# Patient Record
Sex: FEMALE | Race: WHITE | NOT HISPANIC OR LATINO | Employment: FULL TIME | URBAN - METROPOLITAN AREA
[De-identification: names, ages, dates, MRNs, and addresses within clinical notes are randomized per-mention and may not be internally consistent; named-entity substitution may affect disease eponyms.]

---

## 2022-09-20 ENCOUNTER — HOSPITAL ENCOUNTER (OUTPATIENT)
Dept: TELEMEDICINE | Facility: OTHER | Age: 67
Discharge: HOME | End: 2022-09-20
Payer: COMMERCIAL

## 2022-09-20 PROCEDURE — G0425 PR INPT TELEHEALTH CONSULT 30M: ICD-10-PCS | Mod: GT,,, | Performed by: PSYCHIATRY & NEUROLOGY

## 2022-09-20 PROCEDURE — G0425 INPT/ED TELECONSULT30: HCPCS | Mod: GT,,, | Performed by: PSYCHIATRY & NEUROLOGY

## 2022-09-20 NOTE — CONSULTS
[facility]  Psychiatry  Telepsychiatry Consult Note    Please see previous notes:    Patient agreeable to consultation via telepsychiatry.    Tele-Consultation from Psychiatry started: 9/20/2022 at 5:20 PM  The chief complaint leading to psychiatric consultation is: Anxiety   This consultation was requested by [facility], the Emergency Department attending physician.  The location of the consulting psychiatrist is [place], [place].  The patient location is [facility]   Also present with the patient at the time of the consultation: Nursing staff    Patient Identification:   Jasmina Sharma is a 67 y.o. female.    Patient information was obtained from patient.    Consults  Consult Start Time: 09/20/2022 17:20 CDT  Consult End Time: 09/20/2022 17:40 CDT      Subjective:     History of Present Illness:  On Interview:   Patient seen through teleconference this evening on my approach. She reports that she is currently hospitalized due to chronic COPD and she is currently in the hospital for physical rehab.     She reports that she is doing well in the hospital and she believes that the care that she is currently getting is adequate. She reports that she has had some issues with anxiety due to being intubated recently. She reports that she has been shaking frequently and having panic attacks. She has been prescribed Klonopin 1 mg PO TID in the past however the medication was decreased due to the patient being found lethargic in the morning.     Her main concern at this time is difficulty sleeping and trouble eating. She denies any SI/HI.    Psychiatric History:   Previous Psychiatric Hospitalizations: No   Previous Medication Trials: Yes   Previous Suicide Attempts: no   History of Violence: No  History of Depression: No  History of Zahra: No  History of Auditory/Visual Hallucination No  History of Delusions: No  Outpatient psychiatrist  "(current & past): No    Substance Abuse History:  Tobacco:Yes  Alcohol: Yes  Illicit Substances:No  Detox/Rehab: No    Legal History: Past charges/incarcerations: No     Family Psychiatric History: No      Social History:  Developmental/Childhood:Achieved all developmental milestones timely  Education: Associates degree in nursing  Employment Status/Finances:Retired   Relationship Status/Sexual Orientation:  x 3  Children: 2  Housing Status: Home alone   history:  NO  Access to gun: YES:      Judaism:Actively participates in organized Yarsanism  Recreational activities: Time with friends    Patient was born and raised in [place]   Psychiatric Mental Status Exam:  Arousal: alert  Sensorium/Orientation: disoriented to place, oriented to time and person  Behavior/Cooperation: normal, cooperative   Speech: normal tone, normal rate, normal pitch, normal volume  Language: grossly intact  Mood: " anxious "   Affect: appropriate  Thought Process: normal and logical  Thought Content:   Auditory hallucinations: NO  Visual hallucinations: NO  Paranoia: NO  Delusions:  NO  Suicidal ideation: NO  Homicidal ideation: NO  Attention/Concentration:  intact  Memory:    Recent:  Intact   Remote: Intact  Fund of Knowledge: Aware of current events   Abstract reasoning: proverbs were abstract, similarities were abstract  Insight: has awareness of illness  Judgment: Fair      Past Medical History: No past medical history on file.   Laboratory Data: Labs Reviewed - No data to display    Neurological History:  Seizures: No  Head trauma: No    Allergies:   Review of patient's allergies indicates:  Not on File    Medications in ER: Medications - No data to display    Medications at home:     No new subjective & objective note has been filed under this hospital service since the last note was generated.      Assessment - Diagnosis - Goals:     Diagnosis/Impression: Patient is a 67 year old woman currently admitted to physical " rehab. Psychiatry was consulted to assist with the patient's issues with sleep and anxiety.    Rec:   -Agree with Celexa 40 mg PO daily  -Agree with Klonopin 0.5 mg PO TID  -Initiate Remeron 15 mg PO QHS    Please reconsult psychiatry if necessary.     Time with patient: 20 minutes      More than 50% of the time was spent counseling/coordinating care    Consulting clinician was informed of the encounter and consult note.    Consultation ended: 9/20/2022 at 5:40 PM    Elio Gutiérrez DO   Psychiatry  [facility]

## 2022-10-24 ENCOUNTER — LAB VISIT (OUTPATIENT)
Dept: LAB | Facility: HOSPITAL | Age: 67
End: 2022-10-24
Payer: MEDICARE

## 2022-10-24 ENCOUNTER — OFFICE VISIT (OUTPATIENT)
Dept: PRIMARY CARE CLINIC | Facility: CLINIC | Age: 67
End: 2022-10-24
Payer: MEDICARE

## 2022-10-24 VITALS
OXYGEN SATURATION: 90 % | RESPIRATION RATE: 16 BRPM | HEIGHT: 62 IN | HEART RATE: 72 BPM | DIASTOLIC BLOOD PRESSURE: 68 MMHG | TEMPERATURE: 98 F | WEIGHT: 113.31 LBS | BODY MASS INDEX: 20.85 KG/M2 | SYSTOLIC BLOOD PRESSURE: 112 MMHG

## 2022-10-24 DIAGNOSIS — J31.0 CHRONIC RHINITIS: ICD-10-CM

## 2022-10-24 DIAGNOSIS — I50.32 CHRONIC DIASTOLIC CONGESTIVE HEART FAILURE: ICD-10-CM

## 2022-10-24 DIAGNOSIS — H61.21 IMPACTED CERUMEN, RIGHT EAR: ICD-10-CM

## 2022-10-24 DIAGNOSIS — R39.11 URINARY HESITANCY: ICD-10-CM

## 2022-10-24 DIAGNOSIS — Z76.89 ENCOUNTER TO ESTABLISH CARE: Primary | ICD-10-CM

## 2022-10-24 DIAGNOSIS — R06.02 SOB (SHORTNESS OF BREATH) ON EXERTION: ICD-10-CM

## 2022-10-24 DIAGNOSIS — I10 PRIMARY HYPERTENSION: ICD-10-CM

## 2022-10-24 DIAGNOSIS — G62.9 NEUROPATHY: ICD-10-CM

## 2022-10-24 DIAGNOSIS — F41.9 ANXIETY: ICD-10-CM

## 2022-10-24 DIAGNOSIS — E78.5 HYPERLIPIDEMIA, UNSPECIFIED HYPERLIPIDEMIA TYPE: ICD-10-CM

## 2022-10-24 DIAGNOSIS — Z11.59 ENCOUNTER FOR HCV SCREENING TEST FOR LOW RISK PATIENT: ICD-10-CM

## 2022-10-24 DIAGNOSIS — G47.9 SLEEP DIFFICULTIES: ICD-10-CM

## 2022-10-24 DIAGNOSIS — N18.31 STAGE 3A CHRONIC KIDNEY DISEASE: ICD-10-CM

## 2022-10-24 DIAGNOSIS — Z12.11 COLON CANCER SCREENING: ICD-10-CM

## 2022-10-24 DIAGNOSIS — J44.9 CHRONIC OBSTRUCTIVE PULMONARY DISEASE, UNSPECIFIED COPD TYPE: ICD-10-CM

## 2022-10-24 DIAGNOSIS — Z12.31 BREAST CANCER SCREENING BY MAMMOGRAM: ICD-10-CM

## 2022-10-24 DIAGNOSIS — K21.9 GASTROESOPHAGEAL REFLUX DISEASE WITHOUT ESOPHAGITIS: ICD-10-CM

## 2022-10-24 PROCEDURE — 3288F PR FALLS RISK ASSESSMENT DOCUMENTED: ICD-10-PCS | Mod: CPTII,S$GLB,, | Performed by: FAMILY MEDICINE

## 2022-10-24 PROCEDURE — 3074F SYST BP LT 130 MM HG: CPT | Mod: CPTII,S$GLB,, | Performed by: FAMILY MEDICINE

## 2022-10-24 PROCEDURE — 1160F PR REVIEW ALL MEDS BY PRESCRIBER/CLIN PHARMACIST DOCUMENTED: ICD-10-PCS | Mod: CPTII,S$GLB,, | Performed by: FAMILY MEDICINE

## 2022-10-24 PROCEDURE — 99999 PR PBB SHADOW E&M-EST. PATIENT-LVL V: CPT | Mod: PBBFAC,,, | Performed by: FAMILY MEDICINE

## 2022-10-24 PROCEDURE — 99499 RISK ADDL DX/OHS AUDIT: ICD-10-PCS | Mod: S$GLB,,, | Performed by: FAMILY MEDICINE

## 2022-10-24 PROCEDURE — 3074F PR MOST RECENT SYSTOLIC BLOOD PRESSURE < 130 MM HG: ICD-10-PCS | Mod: CPTII,S$GLB,, | Performed by: FAMILY MEDICINE

## 2022-10-24 PROCEDURE — 99499 UNLISTED E&M SERVICE: CPT | Mod: S$GLB,,, | Performed by: FAMILY MEDICINE

## 2022-10-24 PROCEDURE — 4010F ACE/ARB THERAPY RXD/TAKEN: CPT | Mod: CPTII,S$GLB,, | Performed by: FAMILY MEDICINE

## 2022-10-24 PROCEDURE — 99205 OFFICE O/P NEW HI 60 MIN: CPT | Mod: S$GLB,,, | Performed by: FAMILY MEDICINE

## 2022-10-24 PROCEDURE — 84443 ASSAY THYROID STIM HORMONE: CPT | Performed by: FAMILY MEDICINE

## 2022-10-24 PROCEDURE — 99205 PR OFFICE/OUTPT VISIT, NEW, LEVL V, 60-74 MIN: ICD-10-PCS | Mod: S$GLB,,, | Performed by: FAMILY MEDICINE

## 2022-10-24 PROCEDURE — 1160F RVW MEDS BY RX/DR IN RCRD: CPT | Mod: CPTII,S$GLB,, | Performed by: FAMILY MEDICINE

## 2022-10-24 PROCEDURE — 36415 COLL VENOUS BLD VENIPUNCTURE: CPT | Mod: PN | Performed by: FAMILY MEDICINE

## 2022-10-24 PROCEDURE — 1159F MED LIST DOCD IN RCRD: CPT | Mod: CPTII,S$GLB,, | Performed by: FAMILY MEDICINE

## 2022-10-24 PROCEDURE — 86803 HEPATITIS C AB TEST: CPT | Performed by: FAMILY MEDICINE

## 2022-10-24 PROCEDURE — 3288F FALL RISK ASSESSMENT DOCD: CPT | Mod: CPTII,S$GLB,, | Performed by: FAMILY MEDICINE

## 2022-10-24 PROCEDURE — 80053 COMPREHEN METABOLIC PANEL: CPT | Performed by: FAMILY MEDICINE

## 2022-10-24 PROCEDURE — 4010F PR ACE/ARB THEARPY RXD/TAKEN: ICD-10-PCS | Mod: CPTII,S$GLB,, | Performed by: FAMILY MEDICINE

## 2022-10-24 PROCEDURE — 99999 PR PBB SHADOW E&M-EST. PATIENT-LVL V: ICD-10-PCS | Mod: PBBFAC,,, | Performed by: FAMILY MEDICINE

## 2022-10-24 PROCEDURE — 87086 URINE CULTURE/COLONY COUNT: CPT | Performed by: FAMILY MEDICINE

## 2022-10-24 PROCEDURE — 1101F PT FALLS ASSESS-DOCD LE1/YR: CPT | Mod: CPTII,S$GLB,, | Performed by: FAMILY MEDICINE

## 2022-10-24 PROCEDURE — 1101F PR PT FALLS ASSESS DOC 0-1 FALLS W/OUT INJ PAST YR: ICD-10-PCS | Mod: CPTII,S$GLB,, | Performed by: FAMILY MEDICINE

## 2022-10-24 PROCEDURE — 3078F DIAST BP <80 MM HG: CPT | Mod: CPTII,S$GLB,, | Performed by: FAMILY MEDICINE

## 2022-10-24 PROCEDURE — 85025 COMPLETE CBC W/AUTO DIFF WBC: CPT | Performed by: FAMILY MEDICINE

## 2022-10-24 PROCEDURE — 80061 LIPID PANEL: CPT | Performed by: FAMILY MEDICINE

## 2022-10-24 PROCEDURE — 83880 ASSAY OF NATRIURETIC PEPTIDE: CPT | Performed by: FAMILY MEDICINE

## 2022-10-24 PROCEDURE — 3078F PR MOST RECENT DIASTOLIC BLOOD PRESSURE < 80 MM HG: ICD-10-PCS | Mod: CPTII,S$GLB,, | Performed by: FAMILY MEDICINE

## 2022-10-24 PROCEDURE — 81003 URINALYSIS AUTO W/O SCOPE: CPT | Performed by: FAMILY MEDICINE

## 2022-10-24 PROCEDURE — 1159F PR MEDICATION LIST DOCUMENTED IN MEDICAL RECORD: ICD-10-PCS | Mod: CPTII,S$GLB,, | Performed by: FAMILY MEDICINE

## 2022-10-24 RX ORDER — PRAMIPEXOLE DIHYDROCHLORIDE 0.75 MG/1
TABLET ORAL
COMMUNITY

## 2022-10-24 RX ORDER — FLUTICASONE FUROATE, UMECLIDINIUM BROMIDE AND VILANTEROL TRIFENATATE 100; 62.5; 25 UG/1; UG/1; UG/1
POWDER RESPIRATORY (INHALATION)
COMMUNITY
Start: 2022-09-16

## 2022-10-24 RX ORDER — METOPROLOL SUCCINATE 25 MG/1
25 TABLET, EXTENDED RELEASE ORAL DAILY
COMMUNITY
Start: 2022-09-27 | End: 2022-10-24 | Stop reason: SDUPTHER

## 2022-10-24 RX ORDER — NALOXONE HYDROCHLORIDE 4 MG/.1ML
SPRAY NASAL
COMMUNITY

## 2022-10-24 RX ORDER — HYDROCODONE BITARTRATE AND ACETAMINOPHEN 7.5; 325 MG/1; MG/1
TABLET ORAL
COMMUNITY
Start: 2022-09-30

## 2022-10-24 RX ORDER — LORATADINE 10 MG/1
TABLET ORAL
COMMUNITY
End: 2022-10-24 | Stop reason: SDUPTHER

## 2022-10-24 RX ORDER — METOPROLOL SUCCINATE 25 MG/1
25 TABLET, EXTENDED RELEASE ORAL DAILY
Qty: 90 TABLET | Refills: 3 | Status: SHIPPED | OUTPATIENT
Start: 2022-10-24 | End: 2023-03-01 | Stop reason: SDUPTHER

## 2022-10-24 RX ORDER — ROSUVASTATIN CALCIUM 10 MG/1
10 TABLET, COATED ORAL NIGHTLY
Qty: 90 TABLET | Refills: 3 | Status: SHIPPED | OUTPATIENT
Start: 2022-10-24 | End: 2023-03-01 | Stop reason: SDUPTHER

## 2022-10-24 RX ORDER — LORATADINE 10 MG/1
10 TABLET ORAL DAILY
Qty: 90 TABLET | Refills: 3 | Status: SHIPPED | OUTPATIENT
Start: 2022-10-24 | End: 2023-06-05 | Stop reason: ALTCHOICE

## 2022-10-24 RX ORDER — BENZONATATE 100 MG/1
CAPSULE ORAL
COMMUNITY
End: 2022-10-24 | Stop reason: ALTCHOICE

## 2022-10-24 RX ORDER — CELECOXIB 200 MG/1
200 CAPSULE ORAL 2 TIMES DAILY
COMMUNITY
Start: 2022-07-29 | End: 2022-11-07 | Stop reason: SDUPTHER

## 2022-10-24 RX ORDER — BUMETANIDE 1 MG/1
0.5 TABLET ORAL EVERY OTHER DAY
Qty: 45 TABLET | Refills: 3 | Status: SHIPPED | OUTPATIENT
Start: 2022-10-24 | End: 2023-03-01 | Stop reason: SDUPTHER

## 2022-10-24 RX ORDER — ROSUVASTATIN CALCIUM 10 MG/1
10 TABLET, COATED ORAL NIGHTLY
COMMUNITY
Start: 2022-10-10 | End: 2022-10-24 | Stop reason: SDUPTHER

## 2022-10-24 RX ORDER — FOLIC ACID 1 MG/1
1 TABLET ORAL EVERY MORNING
COMMUNITY
Start: 2022-09-13 | End: 2022-11-07 | Stop reason: SDUPTHER

## 2022-10-24 RX ORDER — CITALOPRAM 40 MG/1
40 TABLET, FILM COATED ORAL DAILY
Qty: 90 TABLET | Refills: 3 | Status: SHIPPED | OUTPATIENT
Start: 2022-10-24 | End: 2023-03-01 | Stop reason: ALTCHOICE

## 2022-10-24 RX ORDER — BUMETANIDE 1 MG/1
TABLET ORAL
COMMUNITY
Start: 2022-09-27 | End: 2022-10-24 | Stop reason: SDUPTHER

## 2022-10-24 RX ORDER — PANTOPRAZOLE SODIUM 40 MG/1
40 TABLET, DELAYED RELEASE ORAL 2 TIMES DAILY
COMMUNITY
Start: 2022-09-27 | End: 2022-10-24 | Stop reason: SDUPTHER

## 2022-10-24 RX ORDER — ALBUTEROL SULFATE 90 UG/1
2 AEROSOL, METERED RESPIRATORY (INHALATION) EVERY 6 HOURS PRN
COMMUNITY
Start: 2022-01-27 | End: 2023-10-31

## 2022-10-24 RX ORDER — LOSARTAN POTASSIUM 25 MG/1
25 TABLET ORAL DAILY
COMMUNITY
Start: 2022-09-03 | End: 2022-10-24 | Stop reason: SDUPTHER

## 2022-10-24 RX ORDER — CLONAZEPAM 0.5 MG/1
0.25 TABLET ORAL NIGHTLY PRN
COMMUNITY
Start: 2022-10-15

## 2022-10-24 RX ORDER — CITALOPRAM 40 MG/1
40 TABLET, FILM COATED ORAL DAILY
COMMUNITY
Start: 2022-07-08 | End: 2022-10-24 | Stop reason: SDUPTHER

## 2022-10-24 RX ORDER — LOSARTAN POTASSIUM 25 MG/1
25 TABLET ORAL DAILY
Qty: 90 TABLET | Refills: 3 | Status: SHIPPED | OUTPATIENT
Start: 2022-10-24 | End: 2023-03-01 | Stop reason: SDUPTHER

## 2022-10-24 RX ORDER — GABAPENTIN 400 MG/1
CAPSULE ORAL
COMMUNITY
End: 2023-03-01 | Stop reason: ALTCHOICE

## 2022-10-24 RX ORDER — PANTOPRAZOLE SODIUM 40 MG/1
40 TABLET, DELAYED RELEASE ORAL DAILY
Qty: 90 TABLET | Refills: 3 | Status: SHIPPED | OUTPATIENT
Start: 2022-10-24 | End: 2023-03-01 | Stop reason: SDUPTHER

## 2022-10-24 RX ORDER — MIRTAZAPINE 15 MG/1
15 TABLET, FILM COATED ORAL NIGHTLY
COMMUNITY
Start: 2022-09-27 | End: 2022-10-24 | Stop reason: SDUPTHER

## 2022-10-24 RX ORDER — IPRATROPIUM BROMIDE AND ALBUTEROL SULFATE 2.5; .5 MG/3ML; MG/3ML
SOLUTION RESPIRATORY (INHALATION) EVERY 6 HOURS PRN
COMMUNITY
Start: 2022-07-08

## 2022-10-24 RX ORDER — MIRTAZAPINE 15 MG/1
15 TABLET, FILM COATED ORAL NIGHTLY PRN
Qty: 90 TABLET | Refills: 3 | Status: SHIPPED | OUTPATIENT
Start: 2022-10-24 | End: 2023-03-01 | Stop reason: SDUPTHER

## 2022-10-24 NOTE — PATIENT INSTRUCTIONS
Physically, you look pretty good today.      Let us get some blood work today to make sure things look good on the inside, as well.  Results will be posted to [employer] as soon as they are available.      Medication refills sent to pharmacy today.  Please continue taking them, as directed.      We are overdue for screening mammogram.  Orders placed today.  We do those here.      Are also overdue for colon cancer screening.  I understand your reluctance to have a colonoscopy.  Order placed today for Cologuard colon cancer screening test.  [facility] may contact you to verify address and insurance info.  When you receive the kit, please try to complete it asap and send it back to them.  If you have any questions regarding completing the test, feel free to call them directly at [phone].  They have 24/7 telephone support available.     When you leave here, feel free to go to Dr. Kamara's office to see if they have a canister for your inhaler.    Referral placed today for [facility] to get your kidneys checked out.  Feel free to call them at [phone] to schedule an appointment.  When you call, ask to schedule at the [place] location.    Referral also placed for ENT to get the ear wax cleared out.  They are located at our [place] location, on [address], near [facility] [address] (by the [employer]).    Referral also placed for Cardiology at that location.  If you cannot get in with your regular cardiologist in the next couple of weeks, feel free to see one of our cardiologists instead.

## 2022-10-24 NOTE — PROGRESS NOTES
"    [facility]       [address] [place] [address]      Phone: [phone]      Fax: [phone]    Brock Ortiz MD                Office Visit  10/24/2022        Subjective      HPI:  Jasmina Sharma is a 67 y.o. female presents today in clinic for "Establish Care  ."     67-year-old female presents today to establish care, checkup of multiple issues.      Patient states that she was recently hospitalized at [facility].  Hospitalized about 30 days.  All started when she developed a UTI.  Took Macrobid, Cipro.  Symptoms did not improve, ultimately got septic.  Went to the ER, was admitted.  During her stay, she developed a hematoma on her left thigh, which has subsequently resolved.  She also required intubation, unfortunately it was traumatic and caused an upper GI bleed.  That has also resolved.  Upon discharge from the hospital, she was sent to skilled nursing to regain strength.  Discharge from there, now back at home.  Has home health physical therapy coming out 2 times per week.  Still has some balance issues.  Has a rolling walker at home to help with ambulation.  Also has shower chair, raise toilet.    Since coming home, she finds her appetite is improving.  She lost some weight while in the hospital and has gained some of it back.  No chest pains.  Does feel slightly short of breath today.  Has COPD, but has not done her breathing treatments in the last couple of weeks.  The canister where medication gets applied to her nebulizer is broken.  She needs a replacement.  States bowel movements are normal.  Has some urinary hesitancy today.  Would like to get her urine checked to make sure that she is not developing another UTI.    While in the hospital, she was told she had kidney failure.  States that her labs got really bad, but improved prior to discharge.  She would like to get scheduled with a nephrologist to get checked out.  Prefer " somebody in [place].    Has hypertension.  Currently takes losartan 25 mg daily.  No issues with this medication.      Has CHF.  Uses Bumex, as needed, for leg swelling.  Takes Toprol-XL 25 mg daily.  Generally tries to follow with Cardiology regularly.    Has COPD.  Uses Trelegy inhaler daily.  Also uses albuterol inhaler, as needed.  Has nebulizer at home to do DuoNebs.  Supplements with Claritin daily.  Follows regularly with her pulmonologist, Dr. Sun.  States that she sees him every month.  He also writes her Klonopin.    Has chronic anxiety.  Takes 1/2 tablet of Klonopin, twice a day.  Also takes Celexa 40 mg daily.    Has elevated cholesterol levels.  Takes Crestor 10 mg daily for this.    Occasionally has sleep issues.  Uses Remeron 15 mg, nightly, to help with sleep.    Occasional episodes of GERD.  Uses Protonix 40 mg daily.    Occasionally has issues with restless legs.  Uses Mirapex, as needed.    Has chronic neuropathy.  Follows with Pain Management, Dr. BRIAN Dominguez.  He has her on gabapentin 800 mg in the morning, 400 mg at noon and at bedtime.  Also prescribes her Norco, Celebrex.    Had issues with alcohol abuse in the past.  Takes folic acid 1 mg every morning.    PMH: HTN, HLD, CAD, CHF, COPD, chronic neuropathy.    PSH: Multiple cosmetic procedures.    F MH: CAD, DM, lung cancer, liver cancer   Allergies:  Amoxicillin causes itching, rash.  Scopolamine causes itching, rash.  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  Lives alone.    T: Denies current use.  Quit in August, 2022, when went into the hospital.  Previously, two packs per day x 40+ years  A:  Previously daily.  Quit drinking.    D:  Denies     Exercise:  No regular exercise program.  Occasionally gardens.  Gets short of breath with activity.      Pain management:  Dr. BRIAN Dominguez   Pulmonology:  Dr. Sun   Cardiology:  Dr. Taylor     MMG:  States she is due   Colon: None      The following were updated and reviewed by myself in the  chart: medications, past medical history, past surgical history, family history, social history, and allergies.     Medications:  Current Outpatient Medications on File Prior to Visit   Medication Sig Dispense Refill    albuterol (PROVENTIL/VENTOLIN HFA) 90 mcg/actuation inhaler Inhale 2 puffs into the lungs every 6 (six) hours as needed.      albuterol-ipratropium (DUO-NEB) 2.5 mg-0.5 mg/3 mL nebulizer solution Take by nebulization every 6 (six) hours as needed.      celecoxib (CELEBREX) 200 MG capsule Take 200 mg by mouth 2 (two) times daily.      clonazePAM (KLONOPIN) 0.5 MG tablet Take 0.25 mg by mouth nightly as needed.      folic acid (FOLVITE) 1 MG tablet Take 1 tablet by mouth every morning.      gabapentin (NEURONTIN) 400 MG capsule gabapentin 400 mg capsule   TAKE ONE CAPSULE THREE TIMES A DAY (IN THE MORNING, AT NOON, AND AT BEDTIME)      HYDROcodone-acetaminophen (NORCO) 7.5-325 mg per tablet SMARTSI Tablet(s) By Mouth Every 12 Hours PRN      naloxone (NARCAN) 4 mg/actuation Spry naloxone 4 mg/actuation nasal spray   SPRAY 1 SPRAY INTO ONE NOSTRIL REPEAT ANOTHER INTO OPPOSITE NOSTRIL IN 2 TO 3 MINUTES UNTIL PT RESPONSIVE OR HELP ARRIVES      pramipexole (MIRAPEX) 0.75 MG tablet pramipexole 0.75 mg tablet   TAKE 1 TABLET BY MOUTH AT BEDTIME FOR RESTLESS LEGS      TRELEGY ELLIPTA 100-62.5-25 mcg DsDv SMARTSI Inhalation Via Inhaler Daily      [DISCONTINUED] benzonatate (TESSALON) 100 MG capsule benzonatate 100 mg capsule   Take 1 capsule 3 times a day by oral route for 30 days.      [DISCONTINUED] bumetanide (BUMEX) 1 MG tablet SMARTSI.5 Tablet(s) By Mouth Every Other Day      [DISCONTINUED] citalopram (CELEXA) 40 MG tablet Take 40 mg by mouth once daily.      [DISCONTINUED] loratadine (CLARITIN) 10 mg tablet loratadine 10 mg tablet   TK 1 T PO D      [DISCONTINUED] losartan (COZAAR) 25 MG tablet Take 25 mg by mouth once daily.      [DISCONTINUED] metoprolol succinate (TOPROL-XL) 25 MG 24 hr tablet  Take 25 mg by mouth once daily.      [DISCONTINUED] mirtazapine (REMERON) 15 MG tablet Take 15 mg by mouth every evening.      [DISCONTINUED] pantoprazole (PROTONIX) 40 MG tablet Take 40 mg by mouth 2 (two) times daily.      [DISCONTINUED] rosuvastatin (CRESTOR) 10 MG tablet Take 10 mg by mouth every evening.       No current facility-administered medications on file prior to visit.        PMHx:  Past Medical History:   Diagnosis Date    COPD (chronic obstructive pulmonary disease)     Encounter for blood transfusion     Hyperlipidemia     Hypertension       There are no problems to display for this patient.       PSHx:  Past Surgical History:   Procedure Laterality Date    BREAST SURGERY          FHx:  Family History   Problem Relation Age of Onset    Hypertension Mother     Memory loss Mother     Hypertension Father     Diabetes Father     Cancer Father         Social:  Social History     Socioeconomic History    Marital status:    Tobacco Use    Smoking status: Former     Types: Cigarettes, Vaping w/o nicotine     Passive exposure: Past    Smokeless tobacco: Never   Substance and Sexual Activity    Alcohol use: Not Currently    Drug use: Never    Sexual activity: Not Currently        Allergies:  Review of patient's allergies indicates:   Allergen Reactions    Amoxicillin Hives, Itching and Rash    Scopolamine Itching        ROS:  Review of Systems   Constitutional:  Negative for activity change, appetite change, chills and fever.   HENT:  Negative for congestion, postnasal drip, rhinorrhea, sore throat and trouble swallowing.    Respiratory:  Positive for shortness of breath. Negative for cough and wheezing.    Cardiovascular:  Negative for chest pain and palpitations.   Gastrointestinal:  Negative for abdominal pain, constipation, diarrhea, nausea and vomiting.   Genitourinary:  Negative for difficulty urinating.   Musculoskeletal:  Negative for arthralgias and myalgias.   Skin:  Negative for color  "change and rash.   Neurological:  Negative for speech difficulty and headaches.   All other systems reviewed and are negative.       Objective      /68   Pulse 72   Temp 98.1 °F (36.7 °C) (Temporal)   Resp 16   Ht 5' 2" (1.575 m)   Wt 51.4 kg (113 lb 5.1 oz)   LMP  (Exact Date)   SpO2 (!) 90%   BMI 20.73 kg/m²   Ht Readings from Last 3 Encounters:   10/24/22 5' 2" (1.575 m)     Wt Readings from Last 3 Encounters:   10/24/22 51.4 kg (113 lb 5.1 oz)       PHYSICAL EXAM:  Physical Exam  Vitals and nursing note reviewed.   Constitutional:       General: She is not in acute distress.     Appearance: Normal appearance.   HENT:      Head: Normocephalic and atraumatic.      Right Ear: Tympanic membrane, ear canal and external ear normal. There is impacted cerumen.      Left Ear: Tympanic membrane, ear canal and external ear normal.      Nose: Nose normal. No congestion or rhinorrhea.      Mouth/Throat:      Mouth: Mucous membranes are moist.      Pharynx: Oropharynx is clear. No oropharyngeal exudate or posterior oropharyngeal erythema.   Eyes:      Extraocular Movements: Extraocular movements intact.      Conjunctiva/sclera: Conjunctivae normal.      Pupils: Pupils are equal, round, and reactive to light.   Cardiovascular:      Rate and Rhythm: Normal rate and regular rhythm.   Pulmonary:      Effort: Pulmonary effort is normal. No respiratory distress.      Breath sounds: No wheezing, rhonchi or rales.   Musculoskeletal:         General: Normal range of motion.      Cervical back: Normal range of motion.      Right lower le+ Pitting Edema present.      Left lower le+ Pitting Edema present.   Lymphadenopathy:      Cervical: No cervical adenopathy.   Skin:     General: Skin is warm and dry.      Findings: No rash.   Neurological:      Mental Status: She is alert.            LABS / IMAGING:  Recent Results (from the past 4368 hour(s))   HEMOGLOBIN A1C    Collection Time: 22 10:25 PM   Result Value " Ref Range    Hemoglobin A1C 5.2 <=6.5 %   Hemoglobin and Hematocrit, Blood    Collection Time: 08/24/22  9:27 AM   Result Value Ref Range    Hemoglobin 8.7 (L) 12.0 - 16.0 g/dL    Hematocrit 24.1 (L) 37.0 - 47.0 %   Hemoglobin and Hematocrit, Blood    Collection Time: 09/10/22  1:48 PM   Result Value Ref Range    Hemoglobin 8.5 (L) 12.0 - 16.0 g/dL    Hematocrit 27.0 (L) 37.0 - 47.0 %   Hemoglobin and Hematocrit, Blood    Collection Time: 09/11/22  1:07 PM   Result Value Ref Range    Hemoglobin 8.4 (L) 12.0 - 16.0 g/dL    Hematocrit 26.9 (L) 37.0 - 47.0 %         Assessment    1. Encounter to establish care    2. Urinary hesitancy    3. SOB (shortness of breath) on exertion    4. Chronic obstructive pulmonary disease, unspecified COPD type    5. Chronic diastolic congestive heart failure    6. Stage 3a chronic kidney disease    7. Primary hypertension    8. Hyperlipidemia, unspecified hyperlipidemia type    9. Neuropathy    10. Impacted cerumen, right ear    11. Colon cancer screening    12. Anxiety    13. Chronic rhinitis    14. Sleep difficulties    15. Gastroesophageal reflux disease without esophagitis    16. Breast cancer screening by mammogram    17. Encounter for HCV screening test for low risk patient          Plan    Jasmina was seen today for establish care.    Diagnoses and all orders for this visit:    Encounter to establish care  -     Ambulatory referral/consult to Cardiology; Future    Urinary hesitancy  -     Urinalysis  -     CULTURE, URINE    SOB (shortness of breath) on exertion  -     Ambulatory referral/consult to Cardiology; Future    Chronic obstructive pulmonary disease, unspecified COPD type  -     loratadine (CLARITIN) 10 mg tablet; Take 1 tablet (10 mg total) by mouth once daily.    Chronic diastolic congestive heart failure  -     B-TYPE NATRIURETIC PEPTIDE; Future  -     Ambulatory referral/consult to Cardiology; Future  -     bumetanide (BUMEX) 1 MG tablet; Take 0.5 tablets (0.5 mg  total) by mouth every other day.  -     metoprolol succinate (TOPROL-XL) 25 MG 24 hr tablet; Take 1 tablet (25 mg total) by mouth once daily.    Stage 3a chronic kidney disease  -     Ambulatory referral/consult to Nephrology; Future    Primary hypertension  -     CBC Auto Differential; Future  -     Comprehensive Metabolic Panel; Future  -     TSH; Future  -     Lipid Panel; Future  -     losartan (COZAAR) 25 MG tablet; Take 1 tablet (25 mg total) by mouth once daily.  -     metoprolol succinate (TOPROL-XL) 25 MG 24 hr tablet; Take 1 tablet (25 mg total) by mouth once daily.    Hyperlipidemia, unspecified hyperlipidemia type  -     Lipid Panel; Future  -     rosuvastatin (CRESTOR) 10 MG tablet; Take 1 tablet (10 mg total) by mouth every evening.    Neuropathy    Impacted cerumen, right ear  -     Ambulatory referral/consult to ENT; Future    Colon cancer screening  -     Cologuard Screening (Multitarget Stool DNA); Future  -     Cologuard Screening (Multitarget Stool DNA)    Anxiety  -     citalopram (CELEXA) 40 MG tablet; Take 1 tablet (40 mg total) by mouth once daily.    Chronic rhinitis  -     loratadine (CLARITIN) 10 mg tablet; Take 1 tablet (10 mg total) by mouth once daily.    Sleep difficulties  -     mirtazapine (REMERON) 15 MG tablet; Take 1 tablet (15 mg total) by mouth nightly as needed (sleep).    Gastroesophageal reflux disease without esophagitis  -     pantoprazole (PROTONIX) 40 MG tablet; Take 1 tablet (40 mg total) by mouth once daily.    Breast cancer screening by mammogram  -     Mammo Digital Screening Bilat w/ Rajesh; Future    Encounter for HCV screening test for low risk patient  -     Hepatitis C Antibody; Future    Physically, looks fine today.    Does have some mild pitting edema bilaterally.  Continue Bumex.    Screening labs, as above.      Would like her to see Cardiology sooner, rather than later.  States she is unable to get in with her regular cardiologist for a couple of months.   Okay with trying to see [facility].  Referral placed today.    Right ear has cerumen impaction.  Referral to ENT for irrigation, suction.    Order placed today for screening mammogram.      She has never done any kind of colon cancer screening.  Order for Cologuard placed today.    [facility]erral to [facility] for [facility] eval.    Recommended she follow-up with her pulmonologist, as scheduled.  She states she follows with him monthly.      FOLLOW-UP:  Follow up in about 4 months (around 2/24/2023) for check up.    I spent a total of 65 minutes face to face and non-face to face on the date of this visit.This includes time preparing to see the patient (eg, review of tests, notes), obtaining and/or reviewing additional history from an independent historian and/or outside medical records, documenting clinical information in the electronic health record, independently interpreting results and/or communicating results to the patient/family/caregiver, or care coordinator.    Signed by:  Brock Ortiz MD

## 2022-10-25 ENCOUNTER — TELEPHONE (OUTPATIENT)
Dept: PRIMARY CARE CLINIC | Facility: CLINIC | Age: 67
End: 2022-10-25
Payer: MEDICARE

## 2022-10-25 LAB
ALBUMIN SERPL BCP-MCNC: 2.9 G/DL (ref 3.5–5.2)
ALP SERPL-CCNC: 77 U/L (ref 55–135)
ALT SERPL W/O P-5'-P-CCNC: 9 U/L (ref 10–44)
ANION GAP SERPL CALC-SCNC: 11 MMOL/L (ref 8–16)
AST SERPL-CCNC: 16 U/L (ref 10–40)
BASOPHILS # BLD AUTO: 0.03 K/UL (ref 0–0.2)
BASOPHILS NFR BLD: 0.4 % (ref 0–1.9)
BILIRUB SERPL-MCNC: 0.3 MG/DL (ref 0.1–1)
BILIRUB UR QL STRIP: NEGATIVE
BNP SERPL-MCNC: 197 PG/ML (ref 0–99)
BUN SERPL-MCNC: 10 MG/DL (ref 8–23)
CALCIUM SERPL-MCNC: 9 MG/DL (ref 8.7–10.5)
CHLORIDE SERPL-SCNC: 98 MMOL/L (ref 95–110)
CHOLEST SERPL-MCNC: 152 MG/DL (ref 120–199)
CHOLEST/HDLC SERPL: 3.4 {RATIO} (ref 2–5)
CLARITY UR REFRACT.AUTO: CLEAR
CO2 SERPL-SCNC: 25 MMOL/L (ref 23–29)
COLOR UR AUTO: YELLOW
CREAT SERPL-MCNC: 1.2 MG/DL (ref 0.5–1.4)
DIFFERENTIAL METHOD: ABNORMAL
EOSINOPHIL # BLD AUTO: 0.2 K/UL (ref 0–0.5)
EOSINOPHIL NFR BLD: 2.4 % (ref 0–8)
ERYTHROCYTE [DISTWIDTH] IN BLOOD BY AUTOMATED COUNT: 17.6 % (ref 11.5–14.5)
EST. GFR  (NO RACE VARIABLE): 49.9 ML/MIN/1.73 M^2
GLUCOSE SERPL-MCNC: 83 MG/DL (ref 70–110)
GLUCOSE UR QL STRIP: NEGATIVE
HCT VFR BLD AUTO: 25.7 % (ref 37–48.5)
HCV AB SERPL QL IA: NORMAL
HDLC SERPL-MCNC: 45 MG/DL (ref 40–75)
HDLC SERPL: 29.6 % (ref 20–50)
HGB BLD-MCNC: 7.8 G/DL (ref 12–16)
HGB UR QL STRIP: NEGATIVE
IMM GRANULOCYTES # BLD AUTO: 0.09 K/UL (ref 0–0.04)
IMM GRANULOCYTES NFR BLD AUTO: 1.1 % (ref 0–0.5)
KETONES UR QL STRIP: NEGATIVE
LDLC SERPL CALC-MCNC: 75.8 MG/DL (ref 63–159)
LEUKOCYTE ESTERASE UR QL STRIP: NEGATIVE
LYMPHOCYTES # BLD AUTO: 2 K/UL (ref 1–4.8)
LYMPHOCYTES NFR BLD: 24.2 % (ref 18–48)
MCH RBC QN AUTO: 30.7 PG (ref 27–31)
MCHC RBC AUTO-ENTMCNC: 30.4 G/DL (ref 32–36)
MCV RBC AUTO: 101 FL (ref 82–98)
MONOCYTES # BLD AUTO: 0.7 K/UL (ref 0.3–1)
MONOCYTES NFR BLD: 8.2 % (ref 4–15)
NEUTROPHILS # BLD AUTO: 5.1 K/UL (ref 1.8–7.7)
NEUTROPHILS NFR BLD: 63.7 % (ref 38–73)
NITRITE UR QL STRIP: NEGATIVE
NONHDLC SERPL-MCNC: 107 MG/DL
NRBC BLD-RTO: 0 /100 WBC
PH UR STRIP: 6 [PH] (ref 5–8)
PLATELET # BLD AUTO: 403 K/UL (ref 150–450)
PMV BLD AUTO: 10 FL (ref 9.2–12.9)
POTASSIUM SERPL-SCNC: 4.4 MMOL/L (ref 3.5–5.1)
PROT SERPL-MCNC: 5.8 G/DL (ref 6–8.4)
PROT UR QL STRIP: NEGATIVE
RBC # BLD AUTO: 2.54 M/UL (ref 4–5.4)
SODIUM SERPL-SCNC: 134 MMOL/L (ref 136–145)
SP GR UR STRIP: 1.01 (ref 1–1.03)
TRIGL SERPL-MCNC: 156 MG/DL (ref 30–150)
TSH SERPL DL<=0.005 MIU/L-ACNC: 2.21 UIU/ML (ref 0.4–4)
URN SPEC COLLECT METH UR: NORMAL
WBC # BLD AUTO: 8.05 K/UL (ref 3.9–12.7)

## 2022-10-25 NOTE — TELEPHONE ENCOUNTER
----- Message from Ellie Veloz sent at 10/25/2022 12:17 PM CDT -----  Contact: Patient  Patient wants to know result on Labs. Please call her back at [phone]

## 2022-10-25 NOTE — TELEPHONE ENCOUNTER
Called pt and advised her that her labs are still in process. I also advised pt that we will give her a call after Dr. Ortiz review her labs. Pt verbalized understanding.

## 2022-10-26 ENCOUNTER — TELEPHONE (OUTPATIENT)
Dept: PRIMARY CARE CLINIC | Facility: CLINIC | Age: 67
End: 2022-10-26
Payer: MEDICARE

## 2022-10-26 DIAGNOSIS — D64.9 ANEMIA, UNSPECIFIED TYPE: ICD-10-CM

## 2022-10-26 DIAGNOSIS — K21.9 GASTROESOPHAGEAL REFLUX DISEASE WITHOUT ESOPHAGITIS: Primary | ICD-10-CM

## 2022-10-26 LAB — BACTERIA UR CULT: NORMAL

## 2022-10-26 NOTE — TELEPHONE ENCOUNTER
----- Message from Gerri Jin sent at 10/26/2022  3:01 PM CDT -----  Contact: self [phone]  Pt is calling requesting test results . Please call back at [phone] . Thanks/joe

## 2022-10-26 NOTE — TELEPHONE ENCOUNTER
Spoke with pt and advised her that we will give her a call after Dr. Ortiz review her labs. Pt verbalized understanding.

## 2022-10-26 NOTE — PROGRESS NOTES
Ms. Freedman,     You should be able to see the results of your recent blood work in [facility].    Interestingly, blood counts look a bit low.  They have been on the low side the last couple of months, but seemed to have dropped a bit more.  If it is okay with you, I think I would like you to see a hematologist and maybe a gastroenterologist just to make sure that we do not have some kind of blood disorder and that we are not losing blood from somewhere along the GI tract.  I am going to place those referrals today.    Electrolytes, except sodium, were okay.  Sodium was just a tiny bit low.  Be sure you are eating regular meals throughout the day.    Kidney function looks okay.  Creatinine levels have returned into the normal range, and I think your kidney function is back where it needs to be.  You can certainly keep your appointment with the nephrologist just to get their opinion, but I think you are okay.    Thyroid function looks fine.  You are negative for hepatitis-C.    I checked a BNP level on you.  This was slightly elevated.  It makes me think that your breathing issues may be more related to your heart than your lungs.  Definitely take a dose of Bumex today and ask the cardiologist to look at these results tomorrow when you see him.    Lastly, cholesterol levels look great.    Please let us know if you have any questions.

## 2022-10-27 ENCOUNTER — TELEPHONE (OUTPATIENT)
Dept: PRIMARY CARE CLINIC | Facility: CLINIC | Age: 67
End: 2022-10-27
Payer: MEDICARE

## 2022-10-27 ENCOUNTER — OFFICE VISIT (OUTPATIENT)
Dept: CARDIOLOGY | Facility: CLINIC | Age: 67
End: 2022-10-27
Payer: MEDICARE

## 2022-10-27 VITALS
SYSTOLIC BLOOD PRESSURE: 116 MMHG | DIASTOLIC BLOOD PRESSURE: 78 MMHG | HEART RATE: 73 BPM | OXYGEN SATURATION: 94 % | HEIGHT: 62 IN | BODY MASS INDEX: 20.21 KG/M2 | WEIGHT: 109.81 LBS

## 2022-10-27 DIAGNOSIS — E78.5 HYPERLIPIDEMIA, UNSPECIFIED HYPERLIPIDEMIA TYPE: Primary | ICD-10-CM

## 2022-10-27 DIAGNOSIS — I25.10 CORONARY ARTERY DISEASE, UNSPECIFIED VESSEL OR LESION TYPE, UNSPECIFIED WHETHER ANGINA PRESENT, UNSPECIFIED WHETHER NATIVE OR TRANSPLANTED HEART: ICD-10-CM

## 2022-10-27 DIAGNOSIS — I50.32 CHRONIC DIASTOLIC CONGESTIVE HEART FAILURE: ICD-10-CM

## 2022-10-27 DIAGNOSIS — Z76.89 ENCOUNTER TO ESTABLISH CARE: ICD-10-CM

## 2022-10-27 DIAGNOSIS — R06.02 SOB (SHORTNESS OF BREATH) ON EXERTION: Primary | ICD-10-CM

## 2022-10-27 DIAGNOSIS — I10 ESSENTIAL HYPERTENSION: ICD-10-CM

## 2022-10-27 DIAGNOSIS — I50.33 ACUTE ON CHRONIC DIASTOLIC HEART FAILURE: ICD-10-CM

## 2022-10-27 DIAGNOSIS — R06.02 SOB (SHORTNESS OF BREATH) ON EXERTION: ICD-10-CM

## 2022-10-27 PROBLEM — J44.9 SEVERE CHRONIC OBSTRUCTIVE PULMONARY DISEASE: Status: ACTIVE | Noted: 2018-08-27

## 2022-10-27 PROCEDURE — 99999 PR PBB SHADOW E&M-EST. PATIENT-LVL V: CPT | Mod: PBBFAC,,, | Performed by: INTERNAL MEDICINE

## 2022-10-27 PROCEDURE — 99205 OFFICE O/P NEW HI 60 MIN: CPT | Mod: S$GLB,,, | Performed by: INTERNAL MEDICINE

## 2022-10-27 PROCEDURE — 3288F FALL RISK ASSESSMENT DOCD: CPT | Mod: CPTII,S$GLB,, | Performed by: INTERNAL MEDICINE

## 2022-10-27 PROCEDURE — 1101F PR PT FALLS ASSESS DOC 0-1 FALLS W/OUT INJ PAST YR: ICD-10-PCS | Mod: CPTII,S$GLB,, | Performed by: INTERNAL MEDICINE

## 2022-10-27 PROCEDURE — 99499 UNLISTED E&M SERVICE: CPT | Mod: S$GLB,,, | Performed by: FAMILY MEDICINE

## 2022-10-27 PROCEDURE — 4010F PR ACE/ARB THEARPY RXD/TAKEN: ICD-10-PCS | Mod: CPTII,S$GLB,, | Performed by: INTERNAL MEDICINE

## 2022-10-27 PROCEDURE — 1160F PR REVIEW ALL MEDS BY PRESCRIBER/CLIN PHARMACIST DOCUMENTED: ICD-10-PCS | Mod: CPTII,S$GLB,, | Performed by: INTERNAL MEDICINE

## 2022-10-27 PROCEDURE — 1160F RVW MEDS BY RX/DR IN RCRD: CPT | Mod: CPTII,S$GLB,, | Performed by: INTERNAL MEDICINE

## 2022-10-27 PROCEDURE — 1101F PT FALLS ASSESS-DOCD LE1/YR: CPT | Mod: CPTII,S$GLB,, | Performed by: INTERNAL MEDICINE

## 2022-10-27 PROCEDURE — 99205 PR OFFICE/OUTPT VISIT, NEW, LEVL V, 60-74 MIN: ICD-10-PCS | Mod: S$GLB,,, | Performed by: INTERNAL MEDICINE

## 2022-10-27 PROCEDURE — 3078F DIAST BP <80 MM HG: CPT | Mod: CPTII,S$GLB,, | Performed by: INTERNAL MEDICINE

## 2022-10-27 PROCEDURE — 3074F SYST BP LT 130 MM HG: CPT | Mod: CPTII,S$GLB,, | Performed by: INTERNAL MEDICINE

## 2022-10-27 PROCEDURE — 3288F PR FALLS RISK ASSESSMENT DOCUMENTED: ICD-10-PCS | Mod: CPTII,S$GLB,, | Performed by: INTERNAL MEDICINE

## 2022-10-27 PROCEDURE — 1125F PR PAIN SEVERITY QUANTIFIED, PAIN PRESENT: ICD-10-PCS | Mod: CPTII,S$GLB,, | Performed by: INTERNAL MEDICINE

## 2022-10-27 PROCEDURE — 99499 UNLISTED E&M SERVICE: CPT | Mod: S$GLB,,, | Performed by: INTERNAL MEDICINE

## 2022-10-27 PROCEDURE — 99499 RISK ADDL DX/OHS AUDIT: ICD-10-PCS | Mod: S$GLB,,, | Performed by: FAMILY MEDICINE

## 2022-10-27 PROCEDURE — 1159F MED LIST DOCD IN RCRD: CPT | Mod: CPTII,S$GLB,, | Performed by: INTERNAL MEDICINE

## 2022-10-27 PROCEDURE — 1159F PR MEDICATION LIST DOCUMENTED IN MEDICAL RECORD: ICD-10-PCS | Mod: CPTII,S$GLB,, | Performed by: INTERNAL MEDICINE

## 2022-10-27 PROCEDURE — 99499 RISK ADDL DX/OHS AUDIT: ICD-10-PCS | Mod: S$GLB,,, | Performed by: INTERNAL MEDICINE

## 2022-10-27 PROCEDURE — 1125F AMNT PAIN NOTED PAIN PRSNT: CPT | Mod: CPTII,S$GLB,, | Performed by: INTERNAL MEDICINE

## 2022-10-27 PROCEDURE — 99999 PR PBB SHADOW E&M-EST. PATIENT-LVL V: ICD-10-PCS | Mod: PBBFAC,,, | Performed by: INTERNAL MEDICINE

## 2022-10-27 PROCEDURE — 3078F PR MOST RECENT DIASTOLIC BLOOD PRESSURE < 80 MM HG: ICD-10-PCS | Mod: CPTII,S$GLB,, | Performed by: INTERNAL MEDICINE

## 2022-10-27 PROCEDURE — 3074F PR MOST RECENT SYSTOLIC BLOOD PRESSURE < 130 MM HG: ICD-10-PCS | Mod: CPTII,S$GLB,, | Performed by: INTERNAL MEDICINE

## 2022-10-27 PROCEDURE — 4010F ACE/ARB THERAPY RXD/TAKEN: CPT | Mod: CPTII,S$GLB,, | Performed by: INTERNAL MEDICINE

## 2022-10-27 NOTE — TELEPHONE ENCOUNTER
Pt reports she 'feels completely worn out, can barely move or breath'.    Pt reports she has been keeping her oxygen on all day. Pt wants to know what she should do.

## 2022-10-27 NOTE — TELEPHONE ENCOUNTER
----- Message from Moody Gonzalez sent at 10/27/2022  1:19 PM CDT -----  Contact: Jasmina Freedman is calling in regards to her blood count is low , she feel horrible  and she very weak . Please call her back [phone].        Thanks  CF

## 2022-10-27 NOTE — TELEPHONE ENCOUNTER
MD Ashely Pappas MA  Caller: Unspecified (Today,  2:02 PM)  If she feels really horrible, she can go to the ER to get checked out.  They will repeat blood counts and if they have dropped even more, they can decide about giving a transfusion.       Otherwise, I would follow up with Hematology, Cardiology, etc, as scheduled.

## 2022-10-27 NOTE — PROGRESS NOTES
Subjective:   Patient ID:  Jasmina Sharma is a 67 y.o. female who presents for evaluation of to get established with cardiologist , Congestive Heart Failure, and Edema    Pt reports to clinic today to establish care with [facility], previously a pt with [facility]. Pt reports she felt tired/fatigue yesterday and called her PCP who told her to take another Bumex. She reports she occasionally skips her Losartan due to low BP. Pt denies any CP, angina or anginal equivalent at this time. Pt reports she had a recent 30+day admit for UTI/sepsis. Hx of ETOH abuse    Losartan 25mg  Recently started on Crestor  10/24/22 Hgb 7.8 Hct 25.7    Congestive Heart Failure  Patient presents for re-evaluation of congestive heart failure. Patient's current complaints are fatigue. She denies chest pressure/discomfort, exertional chest pressure/discomfort, and lower extremity edema. She states she is compliant most of the time with her medications. She states she is compliant most of the time with her diet.    Past Medical History:   Diagnosis Date    COPD (chronic obstructive pulmonary disease)     Encounter for blood transfusion     Hyperlipidemia     Hypertension        Past Surgical History:   Procedure Laterality Date    BREAST SURGERY         Social History     Tobacco Use    Smoking status: Former     Types: Cigarettes, Vaping w/o nicotine     Passive exposure: Past    Smokeless tobacco: Never   Substance Use Topics    Alcohol use: Not Currently    Drug use: Never       Family History   Problem Relation Age of Onset    Hypertension Mother     Memory loss Mother     Hypertension Father     Diabetes Father     Cancer Father        Current Outpatient Medications on File Prior to Visit   Medication Sig Dispense Refill    albuterol (PROVENTIL/VENTOLIN HFA) 90 mcg/actuation inhaler Inhale 2 puffs into the lungs every 6 (six) hours as needed.      albuterol-ipratropium (DUO-NEB) 2.5 mg-0.5 mg/3 mL nebulizer solution Take by  nebulization every 6 (six) hours as needed.      bumetanide (BUMEX) 1 MG tablet Take 0.5 tablets (0.5 mg total) by mouth every other day. 45 tablet 3    celecoxib (CELEBREX) 200 MG capsule Take 200 mg by mouth 2 (two) times daily.      citalopram (CELEXA) 40 MG tablet Take 1 tablet (40 mg total) by mouth once daily. 90 tablet 3    clonazePAM (KLONOPIN) 0.5 MG tablet Take 0.25 mg by mouth nightly as needed.      folic acid (FOLVITE) 1 MG tablet Take 1 tablet by mouth every morning.      gabapentin (NEURONTIN) 400 MG capsule gabapentin 400 mg capsule   TAKE ONE CAPSULE THREE TIMES A DAY (IN THE MORNING, AT NOON, AND AT BEDTIME)      HYDROcodone-acetaminophen (NORCO) 7.5-325 mg per tablet SMARTSI Tablet(s) By Mouth Every 12 Hours PRN      loratadine (CLARITIN) 10 mg tablet Take 1 tablet (10 mg total) by mouth once daily. 90 tablet 3    losartan (COZAAR) 25 MG tablet Take 1 tablet (25 mg total) by mouth once daily. 90 tablet 3    metoprolol succinate (TOPROL-XL) 25 MG 24 hr tablet Take 1 tablet (25 mg total) by mouth once daily. 90 tablet 3    mirtazapine (REMERON) 15 MG tablet Take 1 tablet (15 mg total) by mouth nightly as needed (sleep). 90 tablet 3    naloxone (NARCAN) 4 mg/actuation Spry naloxone 4 mg/actuation nasal spray   SPRAY 1 SPRAY INTO ONE NOSTRIL REPEAT ANOTHER INTO OPPOSITE NOSTRIL IN 2 TO 3 MINUTES UNTIL PT RESPONSIVE OR HELP ARRIVES      pantoprazole (PROTONIX) 40 MG tablet Take 1 tablet (40 mg total) by mouth once daily. 90 tablet 3    pramipexole (MIRAPEX) 0.75 MG tablet pramipexole 0.75 mg tablet   TAKE 1 TABLET BY MOUTH AT BEDTIME FOR RESTLESS LEGS      rosuvastatin (CRESTOR) 10 MG tablet Take 1 tablet (10 mg total) by mouth every evening. 90 tablet 3    TRELEGY ELLIPTA 100-62.5-25 mcg DsDv SMARTSI Inhalation Via Inhaler Daily       No current facility-administered medications on file prior to visit.      Wt Readings from Last 3 Encounters:   10/27/22 49.8 kg (109 lb 12.6 oz)   10/24/22 51.4  kg (113 lb 5.1 oz)     Temp Readings from Last 3 Encounters:   10/24/22 98.1 °F (36.7 °C) (Temporal)     BP Readings from Last 3 Encounters:   10/27/22 116/78   10/24/22 112/68     Pulse Readings from Last 3 Encounters:   10/27/22 73   10/24/22 72        Review of Systems   Constitutional: Positive for malaise/fatigue. Negative for weight gain.   HENT: Negative.     Eyes: Negative.    Cardiovascular: Negative.  Negative for chest pain, leg swelling and palpitations.   Respiratory:  Positive for cough and shortness of breath.    Endocrine: Negative.    Hematologic/Lymphatic: Negative.    Skin: Negative.    Musculoskeletal: Negative.  Negative for muscle weakness.   Gastrointestinal: Negative.    Genitourinary: Negative.    Neurological: Negative.  Negative for dizziness.   Psychiatric/Behavioral: Negative.     Allergic/Immunologic: Negative.    All other systems reviewed and are negative.    Objective:   Physical Exam  Vitals and nursing note reviewed.   Constitutional:       Appearance: Normal appearance. She is well-developed.   HENT:      Head: Normocephalic and atraumatic.   Eyes:      Conjunctiva/sclera: Conjunctivae normal.      Pupils: Pupils are equal, round, and reactive to light.   Cardiovascular:      Rate and Rhythm: Normal rate and regular rhythm.      Pulses: Intact distal pulses.      Heart sounds: Normal heart sounds, S1 normal and S2 normal. No murmur heard.    No S3 or S4 sounds.   Pulmonary:      Effort: Pulmonary effort is normal.      Breath sounds: Normal breath sounds.   Abdominal:      General: Bowel sounds are normal.      Palpations: Abdomen is soft.   Musculoskeletal:         General: Normal range of motion.      Cervical back: Normal range of motion and neck supple.   Skin:     General: Skin is warm and dry.      Capillary Refill: Capillary refill takes less than 2 seconds.   Neurological:      General: No focal deficit present.      Mental Status: She is alert and oriented to person,  place, and time.   Psychiatric:         Mood and Affect: Mood normal.         Behavior: Behavior normal.         Thought Content: Thought content normal.       Lab Results   Component Value Date    CHOL 152 10/24/2022     Lab Results   Component Value Date    HDL 45 10/24/2022     Lab Results   Component Value Date    LDLCALC 75.8 10/24/2022     Lab Results   Component Value Date    TRIG 156 (H) 10/24/2022     Lab Results   Component Value Date    CHOLHDL 29.6 10/24/2022       Chemistry        Component Value Date/Time     (L) 10/24/2022 1512    K 4.4 10/24/2022 1512    CL 98 10/24/2022 1512    CO2 25 10/24/2022 1512    BUN 10 10/24/2022 1512    CREATININE 1.2 10/24/2022 1512    GLU 83 10/24/2022 1512        Component Value Date/Time    CALCIUM 9.0 10/24/2022 1512    ALKPHOS 77 10/24/2022 1512    AST 16 10/24/2022 1512    ALT 9 (L) 10/24/2022 1512    BILITOT 0.3 10/24/2022 1512          Lab Results   Component Value Date    TSH 2.207 10/24/2022     No results found for: INR, PROTIME  @RESUFAST(WBC,HGB,HCT,MCV,PLT)  @LABRCNTIP(BNP,BNPTRIAGEBLO)@  Estimated Creatinine Clearance: 36.3 mL/min (based on SCr of 1.2 mg/dL).     No results found for this or any previous visit.     No results found for this or any previous visit.     Assessment:      1. Hyperlipidemia, unspecified hyperlipidemia type    2. Encounter to establish care    3. SOB (shortness of breath) on exertion    4. Chronic diastolic congestive heart failure    5. Coronary artery disease, unspecified vessel or lesion type, unspecified whether angina present, unspecified whether native or transplanted heart    6. Essential hypertension    7. Acute on chronic diastolic heart failure        Plan:   Hyperlipidemia, unspecified hyperlipidemia type    Encounter to establish care  -     Ambulatory referral/consult to Cardiology    SOB (shortness of breath) on exertion  -     Ambulatory referral/consult to Cardiology    Chronic diastolic congestive heart  failure  -     Ambulatory referral/consult to Cardiology    Coronary artery disease, unspecified vessel or lesion type, unspecified whether angina present, unspecified whether native or transplanted heart    Essential hypertension    Acute on chronic diastolic heart failure    Decrease losartan 1/2 tab-htn, continue metoprolol, diuretics -htn/chf  Continue statin-hlp

## 2022-10-28 DIAGNOSIS — D64.9 ANEMIA: Primary | ICD-10-CM

## 2022-11-01 ENCOUNTER — HOSPITAL ENCOUNTER (OUTPATIENT)
Dept: CARDIOLOGY | Facility: HOSPITAL | Age: 67
Discharge: HOME | End: 2022-11-01
Attending: INTERNAL MEDICINE
Payer: MEDICARE

## 2022-11-01 DIAGNOSIS — R06.02 SOB (SHORTNESS OF BREATH) ON EXERTION: ICD-10-CM

## 2022-11-01 PROCEDURE — 93010 ELECTROCARDIOGRAM REPORT: CPT | Mod: ,,, | Performed by: INTERNAL MEDICINE

## 2022-11-01 PROCEDURE — 93005 ELECTROCARDIOGRAM TRACING: CPT | Mod: PO

## 2022-11-01 PROCEDURE — 93010 EKG 12-LEAD: ICD-10-PCS | Mod: ,,, | Performed by: INTERNAL MEDICINE

## 2022-11-03 ENCOUNTER — LAB VISIT (OUTPATIENT)
Dept: LAB | Facility: HOSPITAL | Age: 67
End: 2022-11-03
Attending: NURSE PRACTITIONER
Payer: MEDICARE

## 2022-11-03 ENCOUNTER — OFFICE VISIT (OUTPATIENT)
Dept: HEMATOLOGY/ONCOLOGY | Facility: CLINIC | Age: 67
End: 2022-11-03
Payer: MEDICARE

## 2022-11-03 ENCOUNTER — TELEPHONE (OUTPATIENT)
Dept: HEMATOLOGY/ONCOLOGY | Facility: CLINIC | Age: 67
End: 2022-11-03
Payer: MEDICARE

## 2022-11-03 VITALS
DIASTOLIC BLOOD PRESSURE: 84 MMHG | HEIGHT: 62 IN | BODY MASS INDEX: 20.4 KG/M2 | SYSTOLIC BLOOD PRESSURE: 173 MMHG | WEIGHT: 110.88 LBS | HEART RATE: 80 BPM

## 2022-11-03 DIAGNOSIS — F10.20 ALCOHOL DEPENDENCE, UNCOMPLICATED: ICD-10-CM

## 2022-11-03 DIAGNOSIS — J44.9 SEVERE CHRONIC OBSTRUCTIVE PULMONARY DISEASE: ICD-10-CM

## 2022-11-03 DIAGNOSIS — D64.9 ANEMIA, UNSPECIFIED TYPE: ICD-10-CM

## 2022-11-03 DIAGNOSIS — R23.3 EASY BRUISING: Primary | ICD-10-CM

## 2022-11-03 DIAGNOSIS — G62.9 NEUROPATHY: ICD-10-CM

## 2022-11-03 LAB
ALBUMIN SERPL BCP-MCNC: 3.4 G/DL (ref 3.5–5.2)
ALP SERPL-CCNC: 77 U/L (ref 55–135)
ALT SERPL W/O P-5'-P-CCNC: 8 U/L (ref 10–44)
ANION GAP SERPL CALC-SCNC: 11 MMOL/L (ref 8–16)
AST SERPL-CCNC: 18 U/L (ref 10–40)
BASOPHILS # BLD AUTO: 0.04 K/UL (ref 0–0.2)
BASOPHILS NFR BLD: 0.5 % (ref 0–1.9)
BILIRUB SERPL-MCNC: 0.4 MG/DL (ref 0.1–1)
BUN SERPL-MCNC: 9 MG/DL (ref 8–23)
CALCIUM SERPL-MCNC: 9.3 MG/DL (ref 8.7–10.5)
CHLORIDE SERPL-SCNC: 97 MMOL/L (ref 95–110)
CO2 SERPL-SCNC: 24 MMOL/L (ref 23–29)
CREAT SERPL-MCNC: 0.9 MG/DL (ref 0.5–1.4)
CRP SERPL-MCNC: 3.8 MG/L (ref 0–8.2)
DIFFERENTIAL METHOD: ABNORMAL
EOSINOPHIL # BLD AUTO: 0.1 K/UL (ref 0–0.5)
EOSINOPHIL NFR BLD: 1.5 % (ref 0–8)
ERYTHROCYTE [DISTWIDTH] IN BLOOD BY AUTOMATED COUNT: 16.5 % (ref 11.5–14.5)
EST. GFR  (NO RACE VARIABLE): >60 ML/MIN/1.73 M^2
GLUCOSE SERPL-MCNC: 81 MG/DL (ref 70–110)
HCT VFR BLD AUTO: 27.7 % (ref 37–48.5)
HGB BLD-MCNC: 8.8 G/DL (ref 12–16)
IMM GRANULOCYTES # BLD AUTO: 0.05 K/UL (ref 0–0.04)
IMM GRANULOCYTES NFR BLD AUTO: 0.6 % (ref 0–0.5)
LDH SERPL L TO P-CCNC: 128 U/L (ref 110–260)
LYMPHOCYTES # BLD AUTO: 1.7 K/UL (ref 1–4.8)
LYMPHOCYTES NFR BLD: 20.4 % (ref 18–48)
MCH RBC QN AUTO: 30.8 PG (ref 27–31)
MCHC RBC AUTO-ENTMCNC: 31.8 G/DL (ref 32–36)
MCV RBC AUTO: 97 FL (ref 82–98)
MONOCYTES # BLD AUTO: 0.6 K/UL (ref 0.3–1)
MONOCYTES NFR BLD: 6.9 % (ref 4–15)
NEUTROPHILS # BLD AUTO: 6 K/UL (ref 1.8–7.7)
NEUTROPHILS NFR BLD: 70.1 % (ref 38–73)
NRBC BLD-RTO: 0 /100 WBC
PLATELET # BLD AUTO: 313 K/UL (ref 150–450)
PMV BLD AUTO: 9.6 FL (ref 9.2–12.9)
POTASSIUM SERPL-SCNC: 3.9 MMOL/L (ref 3.5–5.1)
PROT SERPL-MCNC: 6.8 G/DL (ref 6–8.4)
RBC # BLD AUTO: 2.86 M/UL (ref 4–5.4)
RETICS/RBC NFR AUTO: 3.7 % (ref 0.5–2.5)
SODIUM SERPL-SCNC: 132 MMOL/L (ref 136–145)
WBC # BLD AUTO: 8.49 K/UL (ref 3.9–12.7)

## 2022-11-03 PROCEDURE — 86334 IMMUNOFIX E-PHORESIS SERUM: CPT | Performed by: NURSE PRACTITIONER

## 2022-11-03 PROCEDURE — 1160F RVW MEDS BY RX/DR IN RCRD: CPT | Mod: CPTII,S$GLB,, | Performed by: NURSE PRACTITIONER

## 2022-11-03 PROCEDURE — 86140 C-REACTIVE PROTEIN: CPT | Performed by: NURSE PRACTITIONER

## 2022-11-03 PROCEDURE — 83615 LACTATE (LD) (LDH) ENZYME: CPT | Performed by: NURSE PRACTITIONER

## 2022-11-03 PROCEDURE — 3079F PR MOST RECENT DIASTOLIC BLOOD PRESSURE 80-89 MM HG: ICD-10-PCS | Mod: CPTII,S$GLB,, | Performed by: NURSE PRACTITIONER

## 2022-11-03 PROCEDURE — 85060 BLOOD SMEAR INTERPRETATION: CPT | Mod: ,,, | Performed by: PATHOLOGY

## 2022-11-03 PROCEDURE — 82668 ASSAY OF ERYTHROPOIETIN: CPT | Performed by: NURSE PRACTITIONER

## 2022-11-03 PROCEDURE — 3008F PR BODY MASS INDEX (BMI) DOCUMENTED: ICD-10-PCS | Mod: CPTII,S$GLB,, | Performed by: NURSE PRACTITIONER

## 2022-11-03 PROCEDURE — 99499 UNLISTED E&M SERVICE: CPT | Mod: S$GLB,,, | Performed by: NURSE PRACTITIONER

## 2022-11-03 PROCEDURE — 3079F DIAST BP 80-89 MM HG: CPT | Mod: CPTII,S$GLB,, | Performed by: NURSE PRACTITIONER

## 2022-11-03 PROCEDURE — 82607 VITAMIN B-12: CPT | Performed by: NURSE PRACTITIONER

## 2022-11-03 PROCEDURE — 99999 PR PBB SHADOW E&M-EST. PATIENT-LVL V: ICD-10-PCS | Mod: PBBFAC,,, | Performed by: NURSE PRACTITIONER

## 2022-11-03 PROCEDURE — 3288F PR FALLS RISK ASSESSMENT DOCUMENTED: ICD-10-PCS | Mod: CPTII,S$GLB,, | Performed by: NURSE PRACTITIONER

## 2022-11-03 PROCEDURE — 4010F ACE/ARB THERAPY RXD/TAKEN: CPT | Mod: CPTII,S$GLB,, | Performed by: NURSE PRACTITIONER

## 2022-11-03 PROCEDURE — 1101F PR PT FALLS ASSESS DOC 0-1 FALLS W/OUT INJ PAST YR: ICD-10-PCS | Mod: CPTII,S$GLB,, | Performed by: NURSE PRACTITIONER

## 2022-11-03 PROCEDURE — 83521 IG LIGHT CHAINS FREE EACH: CPT | Mod: 59 | Performed by: NURSE PRACTITIONER

## 2022-11-03 PROCEDURE — 1160F PR REVIEW ALL MEDS BY PRESCRIBER/CLIN PHARMACIST DOCUMENTED: ICD-10-PCS | Mod: CPTII,S$GLB,, | Performed by: NURSE PRACTITIONER

## 2022-11-03 PROCEDURE — 86334 PATHOLOGIST INTERPRETATION IFE: ICD-10-PCS | Mod: 26,,, | Performed by: PATHOLOGY

## 2022-11-03 PROCEDURE — 1159F PR MEDICATION LIST DOCUMENTED IN MEDICAL RECORD: ICD-10-PCS | Mod: CPTII,S$GLB,, | Performed by: NURSE PRACTITIONER

## 2022-11-03 PROCEDURE — 85025 COMPLETE CBC W/AUTO DIFF WBC: CPT | Performed by: NURSE PRACTITIONER

## 2022-11-03 PROCEDURE — 3288F FALL RISK ASSESSMENT DOCD: CPT | Mod: CPTII,S$GLB,, | Performed by: NURSE PRACTITIONER

## 2022-11-03 PROCEDURE — 86334 IMMUNOFIX E-PHORESIS SERUM: CPT | Mod: 26,,, | Performed by: PATHOLOGY

## 2022-11-03 PROCEDURE — 99205 OFFICE O/P NEW HI 60 MIN: CPT | Mod: S$GLB,,, | Performed by: NURSE PRACTITIONER

## 2022-11-03 PROCEDURE — 85045 AUTOMATED RETICULOCYTE COUNT: CPT | Performed by: NURSE PRACTITIONER

## 2022-11-03 PROCEDURE — 82746 ASSAY OF FOLIC ACID SERUM: CPT | Performed by: NURSE PRACTITIONER

## 2022-11-03 PROCEDURE — 1125F PR PAIN SEVERITY QUANTIFIED, PAIN PRESENT: ICD-10-PCS | Mod: CPTII,S$GLB,, | Performed by: NURSE PRACTITIONER

## 2022-11-03 PROCEDURE — 84165 PROTEIN E-PHORESIS SERUM: CPT | Performed by: NURSE PRACTITIONER

## 2022-11-03 PROCEDURE — 83010 ASSAY OF HAPTOGLOBIN QUANT: CPT | Performed by: NURSE PRACTITIONER

## 2022-11-03 PROCEDURE — 3077F SYST BP >= 140 MM HG: CPT | Mod: CPTII,S$GLB,, | Performed by: NURSE PRACTITIONER

## 2022-11-03 PROCEDURE — 3077F PR MOST RECENT SYSTOLIC BLOOD PRESSURE >= 140 MM HG: ICD-10-PCS | Mod: CPTII,S$GLB,, | Performed by: NURSE PRACTITIONER

## 2022-11-03 PROCEDURE — 84165 PATHOLOGIST INTERPRETATION SPE: ICD-10-PCS | Mod: 26,,, | Performed by: PATHOLOGY

## 2022-11-03 PROCEDURE — 80053 COMPREHEN METABOLIC PANEL: CPT | Performed by: NURSE PRACTITIONER

## 2022-11-03 PROCEDURE — 3008F BODY MASS INDEX DOCD: CPT | Mod: CPTII,S$GLB,, | Performed by: NURSE PRACTITIONER

## 2022-11-03 PROCEDURE — 99499 RISK ADDL DX/OHS AUDIT: ICD-10-PCS | Mod: S$GLB,,, | Performed by: NURSE PRACTITIONER

## 2022-11-03 PROCEDURE — 85060 PATHOLOGIST REVIEW: ICD-10-PCS | Mod: ,,, | Performed by: PATHOLOGY

## 2022-11-03 PROCEDURE — 99205 PR OFFICE/OUTPT VISIT, NEW, LEVL V, 60-74 MIN: ICD-10-PCS | Mod: S$GLB,,, | Performed by: NURSE PRACTITIONER

## 2022-11-03 PROCEDURE — 84165 PROTEIN E-PHORESIS SERUM: CPT | Mod: 26,,, | Performed by: PATHOLOGY

## 2022-11-03 PROCEDURE — 99999 PR PBB SHADOW E&M-EST. PATIENT-LVL V: CPT | Mod: PBBFAC,,, | Performed by: NURSE PRACTITIONER

## 2022-11-03 PROCEDURE — 1159F MED LIST DOCD IN RCRD: CPT | Mod: CPTII,S$GLB,, | Performed by: NURSE PRACTITIONER

## 2022-11-03 PROCEDURE — 1125F AMNT PAIN NOTED PAIN PRSNT: CPT | Mod: CPTII,S$GLB,, | Performed by: NURSE PRACTITIONER

## 2022-11-03 PROCEDURE — 1101F PT FALLS ASSESS-DOCD LE1/YR: CPT | Mod: CPTII,S$GLB,, | Performed by: NURSE PRACTITIONER

## 2022-11-03 PROCEDURE — 4010F PR ACE/ARB THEARPY RXD/TAKEN: ICD-10-PCS | Mod: CPTII,S$GLB,, | Performed by: NURSE PRACTITIONER

## 2022-11-03 RX ORDER — PREGABALIN 50 MG/1
50 CAPSULE ORAL 3 TIMES DAILY PRN
COMMUNITY
Start: 2022-11-01

## 2022-11-03 NOTE — PROGRESS NOTES
Subjective:      Patient ID: Jasmina Sharma is a 67 y.o. female.    Chief Complaint: neuropathy    HPI:  Patient is a 67 year old female who presents today as a new patient for further evaluation and recommendation of anemia.  Has required blood transfusion in the past for symptomatic anemia.      Other medical diagnosis include severe COPD, acute on chronic diastolic heart failure, HTN, HPLD, CAD, alcohol abuse, portal gastropathy, states that she is on home O2 at night 2 liters.      She presents today with labs from 10/24/2022 showing macrocytic anemia hgb 7.8 mcv 101.  Not currently iron deficient.    She is not up to date with mammogram, colonoscopy, or pap.      Just recently hospitalized for 28 days for respiratory and kidney failure.  Was intubated and confused with increased CO2 and states that she does not remember anything.  Required 3 units of blood after the traumatic intubation/extubation.  States that she had a c diff infection during hospitalization.  She also had a large hematoma on her left inner leg.       Denies any abnormal blood loss.      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  States that she is taking folic acid daily.  States that she has been diagnosed with fatty liver disease.  Last checked about 20 years ago.     States that she used to drink a lot.  Stopped drinking end of 8/2022.  States that she was a heavy smoker.  Says that she smoked a couple of packs since she has been out of the hospital.  Started drinking when she was 56 years old when her ex and her broke up.  Would drink 1-2 bottles and wine and also rum and coke per night.  Per patient, was a very heavy drinker.    Has smoked cigarettes since she was 19 years ago smoking about 2 packs/day.      Cologuard was sent to her in the mail.    On 12/6/2022 will be schedule for mammogram.  Is being followed by pulmonology.  Had CT chest 8/2022.  No evidence of malignancy.      Denies h/o gastric  surgeries in the past.    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    Family hx of cancer:  Father:  stage IV lung cancer - asbestosis    Denies f/c/ns or unintentional weight loss.  Denies any known lymphadenopathy.         Social History     Socioeconomic History    Marital status:    Tobacco Use    Smoking status: Former     Types: Cigarettes, Vaping w/o nicotine     Passive exposure: Past    Smokeless tobacco: Never   Substance and Sexual Activity    Alcohol use: Not Currently    Drug use: Never    Sexual activity: Not Currently       Family History   Problem Relation Age of Onset    Hypertension Mother     Memory loss Mother     Hypertension Father     Diabetes Father     Cancer Father        Past Surgical History:   Procedure Laterality Date    BREAST SURGERY         Past Medical History:   Diagnosis Date    COPD (chronic obstructive pulmonary disease)     Encounter for blood transfusion     Hyperlipidemia     Hypertension        Review of Systems   Constitutional:  Positive for appetite change, fatigue and unexpected weight change.   HENT:  Negative for mouth sores and nosebleeds.    Eyes:  Negative for visual disturbance.   Respiratory:  Positive for cough and shortness of breath.    Cardiovascular:  Negative for chest pain.   Gastrointestinal:  Negative for abdominal pain, anal bleeding, blood in stool, diarrhea and vomiting.   Genitourinary:  Negative for frequency, hematuria and vaginal bleeding.   Musculoskeletal:  Negative for back pain.   Skin:  Negative for rash.   Neurological:  Negative for headaches.        Pain to bilateral feet/numbness   Hematological:  Negative for adenopathy. Bruises/bleeds easily.   Psychiatric/Behavioral:  The patient is nervous/anxious.         Medication List with Changes/Refills   Current Medications    ALBUTEROL (PROVENTIL/VENTOLIN HFA) 90 MCG/ACTUATION INHALER    Inhale 2 puffs into the lungs every 6 (six)  hours as needed.    ALBUTEROL-IPRATROPIUM (DUO-NEB) 2.5 MG-0.5 MG/3 ML NEBULIZER SOLUTION    Take by nebulization every 6 (six) hours as needed.    BUMETANIDE (BUMEX) 1 MG TABLET    Take 0.5 tablets (0.5 mg total) by mouth every other day.    CELECOXIB (CELEBREX) 200 MG CAPSULE    Take 200 mg by mouth 2 (two) times daily.    CITALOPRAM (CELEXA) 40 MG TABLET    Take 1 tablet (40 mg total) by mouth once daily.    CLONAZEPAM (KLONOPIN) 0.5 MG TABLET    Take 0.25 mg by mouth nightly as needed.    FOLIC ACID (FOLVITE) 1 MG TABLET    Take 1 tablet by mouth every morning.    GABAPENTIN (NEURONTIN) 400 MG CAPSULE    gabapentin 400 mg capsule   TAKE ONE CAPSULE THREE TIMES A DAY (IN THE MORNING, AT NOON, AND AT BEDTIME)    HYDROCODONE-ACETAMINOPHEN (NORCO) 7.5-325 MG PER TABLET    SMARTSI Tablet(s) By Mouth Every 12 Hours PRN    LORATADINE (CLARITIN) 10 MG TABLET    Take 1 tablet (10 mg total) by mouth once daily.    LOSARTAN (COZAAR) 25 MG TABLET    Take 1 tablet (25 mg total) by mouth once daily.    METOPROLOL SUCCINATE (TOPROL-XL) 25 MG 24 HR TABLET    Take 1 tablet (25 mg total) by mouth once daily.    MIRTAZAPINE (REMERON) 15 MG TABLET    Take 1 tablet (15 mg total) by mouth nightly as needed (sleep).    NALOXONE (NARCAN) 4 MG/ACTUATION SPRY    naloxone 4 mg/actuation nasal spray   SPRAY 1 SPRAY INTO ONE NOSTRIL REPEAT ANOTHER INTO OPPOSITE NOSTRIL IN 2 TO 3 MINUTES UNTIL PT RESPONSIVE OR HELP ARRIVES    PANTOPRAZOLE (PROTONIX) 40 MG TABLET    Take 1 tablet (40 mg total) by mouth once daily.    PRAMIPEXOLE (MIRAPEX) 0.75 MG TABLET    pramipexole 0.75 mg tablet   TAKE 1 TABLET BY MOUTH AT BEDTIME FOR RESTLESS LEGS    PREGABALIN (LYRICA) 50 MG CAPSULE    Take 50 mg by mouth 3 (three) times daily as needed.    ROSUVASTATIN (CRESTOR) 10 MG TABLET    Take 1 tablet (10 mg total) by mouth every evening.    TRELEGY ELLIPTA 100-62.5-25 MCG DSDV    SMARTSI Inhalation Via Inhaler Daily        Objective:     Vitals:     11/03/22 1503   BP: (!) 173/84   Pulse: 80       Physical Exam  Vitals and nursing note reviewed.   Constitutional:       Appearance: Normal appearance.   HENT:      Head: Normocephalic and atraumatic.      Right Ear: External ear normal.      Left Ear: External ear normal.   Cardiovascular:      Rate and Rhythm: Normal rate and regular rhythm.      Heart sounds: Normal heart sounds, S1 normal and S2 normal.   Pulmonary:      Effort: Pulmonary effort is normal.      Breath sounds: Wheezing present.   Abdominal:      General: There is no distension.   Musculoskeletal:         General: Normal range of motion.      Cervical back: Normal range of motion.   Lymphadenopathy:      Head:      Right side of head: No submental, submandibular, tonsillar, preauricular, posterior auricular or occipital adenopathy.      Left side of head: No submental, submandibular, tonsillar, preauricular, posterior auricular or occipital adenopathy.      Cervical: No cervical adenopathy.      Upper Body:      Right upper body: No supraclavicular, axillary or pectoral adenopathy.      Left upper body: No supraclavicular, axillary or pectoral adenopathy.   Skin:     General: Skin is warm and dry.      Findings: Bruising (bilateral scabs on arms) and lesion present.   Neurological:      General: No focal deficit present.      Mental Status: She is alert and oriented to person, place, and time.   Psychiatric:         Attention and Perception: Attention and perception normal.         Mood and Affect: Mood and affect normal.         Speech: Speech normal.         Behavior: Behavior normal. Behavior is cooperative.         Thought Content: Thought content normal.         Cognition and Memory: Cognition and memory normal.         Judgment: Judgment normal.       Assessment:     Problem List Items Addressed This Visit          Pulmonary    Severe chronic obstructive pulmonary disease     Other Visit Diagnoses       Easy bruising    -  Primary     Relevant Orders    US Abdomen Complete    Anemia, unspecified type        Relevant Orders    CBC Auto Differential    Vitamin B12    Folate    Protein Electrophoresis, Serum    Immunoglobulin Free LT Chains Blood    Immunofixation Electrophoresis    Haptoglobin    Erythropoietin    CBC Auto Differential    Comprehensive Metabolic Panel    US Abdomen Complete    Alcohol dependence, uncomplicated        Relevant Orders    Vitamin B12    Folate    CBC Auto Differential    Comprehensive Metabolic Panel    US Abdomen Complete    Neuropathy                Lab Results   Component Value Date    WBC 8.49 11/03/2022    RBC 2.86 (L) 11/03/2022    HGB 8.8 (L) 11/03/2022    HCT 27.7 (L) 11/03/2022    MCV 97 11/03/2022    MCH 30.8 11/03/2022    MCHC 31.8 (L) 11/03/2022    RDW 16.5 (H) 11/03/2022     11/03/2022    MPV 9.6 11/03/2022    GRAN 6.0 11/03/2022    GRAN 70.1 11/03/2022    LYMPH 1.7 11/03/2022    LYMPH 20.4 11/03/2022    MONO 0.6 11/03/2022    MONO 6.9 11/03/2022    EOS 0.1 11/03/2022    BASO 0.04 11/03/2022    EOSINOPHIL 1.5 11/03/2022    BASOPHIL 0.5 11/03/2022      Lab Results   Component Value Date     (L) 11/03/2022    K 3.9 11/03/2022    CL 97 11/03/2022    CO2 24 11/03/2022    BUN 9 11/03/2022    CREATININE 0.9 11/03/2022    CALCIUM 9.3 11/03/2022    ANIONGAP 11 11/03/2022     Lab Results   Component Value Date    ALT 8 (L) 11/03/2022    AST 18 11/03/2022    ALKPHOS 77 11/03/2022    BILITOT 0.4 11/03/2022     Lab Results   Component Value Date    IRON 85 11/01/2022    TRANSFERRIN 194 (L) 11/01/2022    TIBC 287 11/01/2022    FESATURATED 30 11/01/2022      Lab Results   Component Value Date    FERRITIN 867 (H) 11/01/2022     Lab Results   Component Value Date    TSH 2.207 10/24/2022         Plan:   Easy bruising  -     US Abdomen Complete; Future; Expected date: 11/03/2022    Anemia, unspecified type  -     Ambulatory referral/consult to Hematology / Oncology  -     CBC Auto Differential; Future;  Expected date: 11/03/2022  -     Pathologist Interpretation Differential; Future; Expected date: 11/03/2022  -     Vitamin B12; Future; Expected date: 11/03/2022  -     Folate; Future; Expected date: 11/03/2022  -     Protein Electrophoresis, Serum; Future; Expected date: 11/03/2022  -     Immunoglobulin Free LT Chains Blood; Future; Expected date: 11/03/2022  -     Immunofixation Electrophoresis; Future; Expected date: 11/03/2022  -     Lactate Dehydrogenase; Future; Expected date: 11/03/2022  -     Reticulocytes; Future; Expected date: 11/03/2022  -     Haptoglobin; Future; Expected date: 11/03/2022  -     Erythropoietin; Future; Expected date: 11/03/2022  -     C-Reactive Protein; Future; Expected date: 11/03/2022  -     CBC Auto Differential; Standing  -     Comprehensive Metabolic Panel; Standing  -     US Abdomen Complete; Future; Expected date: 11/03/2022    Alcohol dependence, uncomplicated  -     Vitamin B12; Future; Expected date: 11/03/2022  -     Folate; Future; Expected date: 11/03/2022  -     CBC Auto Differential; Standing  -     Comprehensive Metabolic Panel; Standing  -     US Abdomen Complete; Future; Expected date: 11/03/2022    Neuropathy    Severe chronic obstructive pulmonary disease  -     Comprehensive Metabolic Panel; Future; Expected date: 11/03/2022  Patient will get current with her cancer screenings - mammogram schedule, will complete cologuard testing, and will go to gyn once appointments slow per patient.  Continue alcohol avoidance.  Continue to monitor for s/s of bleeding  Continue folic acid and B12 supplementation daily.  Drop in hgb may be due to alcohol suppression of bone marrow, recent severe infection, or underlying bone marrow related dysfunction.    Hgb is improved from last labs.  Will continue to monitor weekly with CBC and she will f/u in 2 weeks to discuss results.  She knows to f/u sooner if symptoms of fatigue/anemia worsen.  Possible bone marrow biopsy if hgb does not  continue to improve and no cause found on workup.  Abdomina US to assess for cirrhosis of liver.     Collaborating Provider:  Dr. Jose Beltran    Thank You,  Sundar Guzman, JAYSONP-C  Hematology Oncology

## 2022-11-04 ENCOUNTER — PATIENT MESSAGE (OUTPATIENT)
Dept: HEMATOLOGY/ONCOLOGY | Facility: CLINIC | Age: 67
End: 2022-11-04
Payer: MEDICARE

## 2022-11-04 LAB
FOLATE SERPL-MCNC: 16.7 NG/ML (ref 4–24)
HAPTOGLOB SERPL-MCNC: 176 MG/DL (ref 30–250)
VIT B12 SERPL-MCNC: 771 PG/ML (ref 210–950)

## 2022-11-05 ENCOUNTER — PATIENT MESSAGE (OUTPATIENT)
Dept: PRIMARY CARE CLINIC | Facility: CLINIC | Age: 67
End: 2022-11-05
Payer: MEDICARE

## 2022-11-05 LAB — PATH REV BLD -IMP: NORMAL

## 2022-11-07 DIAGNOSIS — I25.10 CORONARY ARTERY DISEASE, UNSPECIFIED VESSEL OR LESION TYPE, UNSPECIFIED WHETHER ANGINA PRESENT, UNSPECIFIED WHETHER NATIVE OR TRANSPLANTED HEART: Primary | ICD-10-CM

## 2022-11-07 LAB
ALBUMIN SERPL ELPH-MCNC: 3.38 G/DL (ref 3.35–5.55)
ALPHA1 GLOB SERPL ELPH-MCNC: 0.4 G/DL (ref 0.17–0.41)
ALPHA2 GLOB SERPL ELPH-MCNC: 0.93 G/DL (ref 0.43–0.99)
B-GLOBULIN SERPL ELPH-MCNC: 0.82 G/DL (ref 0.5–1.1)
GAMMA GLOB SERPL ELPH-MCNC: 0.78 G/DL (ref 0.67–1.58)
INTERPRETATION SERPL IFE-IMP: NORMAL
KAPPA LC SER QL IA: 5.08 MG/DL (ref 0.33–1.94)
KAPPA LC/LAMBDA SER IA: 1.25 (ref 0.26–1.65)
LAMBDA LC SER QL IA: 4.05 MG/DL (ref 0.57–2.63)
PATHOLOGIST INTERPRETATION IFE: NORMAL
PATHOLOGIST INTERPRETATION SPE: NORMAL
PROT SERPL-MCNC: 6.3 G/DL (ref 6–8.4)

## 2022-11-07 RX ORDER — ALLOPURINOL 300 MG/1
300 TABLET ORAL DAILY
Qty: 90 TABLET | Refills: 3 | Status: SHIPPED | OUTPATIENT
Start: 2022-11-07 | End: 2023-03-01 | Stop reason: SDUPTHER

## 2022-11-07 RX ORDER — FOLIC ACID 1 MG/1
1000 TABLET ORAL EVERY MORNING
Qty: 90 TABLET | Refills: 3 | Status: SHIPPED | OUTPATIENT
Start: 2022-11-07 | End: 2023-06-05 | Stop reason: SDUPTHER

## 2022-11-07 RX ORDER — CELECOXIB 200 MG/1
200 CAPSULE ORAL 2 TIMES DAILY
OUTPATIENT
Start: 2022-11-07

## 2022-11-07 RX ORDER — CELECOXIB 200 MG/1
200 CAPSULE ORAL 2 TIMES DAILY
Qty: 180 CAPSULE | Refills: 3 | Status: SHIPPED | OUTPATIENT
Start: 2022-11-07 | End: 2023-03-01 | Stop reason: ALTCHOICE

## 2022-11-07 NOTE — TELEPHONE ENCOUNTER
Medication refused due to failing protocol.    Requested Prescriptions   Pending Prescriptions Disp Refills    celecoxib (CELEBREX) 200 MG capsule       Sig: Take 1 capsule (200 mg total) by mouth 2 (two) times daily.       NSAIDs Protocol Failed - 11/7/2022 11:17 AM        Failed - HGB greater than 10 or HCT greater than 30 in past 12 months        Passed - Patient not currently pregnant        Passed - No positive pregnancy test in past 12 months         Passed - Serum Creatinine less than 1.4 on file in the past 12 months     Lab Results   Component Value Date    CREATININE 0.9 11/03/2022    CREATININE 1.2 10/24/2022     No results found for: EGFRNONAA              Passed - Visit with authorizing provider in past 12 months or upcoming 90 days        Passed - AST in past 12 months      Lab Results   Component Value Date    AST 18 11/03/2022    AST 16 10/24/2022              Passed - Serum Potassium less than 5.2 on file in the past 12 months     Lab Results   Component Value Date    K 3.9 11/03/2022    K 4.4 10/24/2022                  Passed - Blood Pressure below 139/89 on file in past 12 months      BP Readings from Last 3 Encounters:   11/03/22 (!) 173/84   10/27/22 116/78   10/24/22 112/68             Passed - ALT less than 95 in past 12 months     Lab Results   Component Value Date    ALT 8 (L) 11/03/2022    ALT 9 (L) 10/24/2022

## 2022-11-08 LAB — EPO SERPL-ACNC: 8.7 MIU/ML (ref 2.6–18.5)

## 2022-11-10 LAB — PATH REV BLD -IMP: NORMAL

## 2022-11-11 ENCOUNTER — PATIENT MESSAGE (OUTPATIENT)
Dept: PRIMARY CARE CLINIC | Facility: CLINIC | Age: 67
End: 2022-11-11
Payer: MEDICARE

## 2022-11-13 ENCOUNTER — PATIENT MESSAGE (OUTPATIENT)
Dept: HEMATOLOGY/ONCOLOGY | Facility: CLINIC | Age: 67
End: 2022-11-13
Payer: MEDICARE

## 2022-11-16 ENCOUNTER — IMMUNIZATION (OUTPATIENT)
Dept: PRIMARY CARE CLINIC | Facility: CLINIC | Age: 67
End: 2022-11-16
Payer: MEDICARE

## 2022-11-16 ENCOUNTER — HOSPITAL ENCOUNTER (OUTPATIENT)
Dept: RADIOLOGY | Facility: HOSPITAL | Age: 67
Discharge: HOME | End: 2022-11-16
Attending: NURSE PRACTITIONER
Payer: MEDICARE

## 2022-11-16 PROCEDURE — G0008 FLU VACCINE - QUADRIVALENT - ADJUVANTED: ICD-10-PCS | Mod: S$GLB,,, | Performed by: FAMILY MEDICINE

## 2022-11-16 PROCEDURE — 90694 VACC AIIV4 NO PRSRV 0.5ML IM: CPT | Mod: S$GLB,,, | Performed by: FAMILY MEDICINE

## 2022-11-16 PROCEDURE — 90694 FLU VACCINE - QUADRIVALENT - ADJUVANTED: ICD-10-PCS | Mod: S$GLB,,, | Performed by: FAMILY MEDICINE

## 2022-11-16 PROCEDURE — 76700 US EXAM ABDOM COMPLETE: CPT | Mod: TC,PN

## 2022-11-16 PROCEDURE — G0008 ADMIN INFLUENZA VIRUS VAC: HCPCS | Mod: S$GLB,,, | Performed by: FAMILY MEDICINE

## 2022-11-17 ENCOUNTER — TELEPHONE (OUTPATIENT)
Dept: HEMATOLOGY/ONCOLOGY | Facility: CLINIC | Age: 67
End: 2022-11-17
Payer: MEDICARE

## 2022-11-17 NOTE — TELEPHONE ENCOUNTER
Nurse spoke with pt in regards to rescheduling her appt. Pt has been rescheduled,. Verbalized understanding of changes

## 2022-11-17 NOTE — TELEPHONE ENCOUNTER
----- Message from Gaby Day sent at 11/17/2022 12:58 PM CST -----  Contact: cristin  .Type:  Patient Returning Call    Who Called:cristin  Who Left Message for Patient:nurse  Does the patient know what this is regarding?:results   Would the patient rather a call back or a response via [employer]? Call back  Best Call Back Number:[phone]  Additional Information: patient returning call

## 2022-11-18 ENCOUNTER — TELEPHONE (OUTPATIENT)
Dept: HEMATOLOGY/ONCOLOGY | Facility: CLINIC | Age: 67
End: 2022-11-18
Payer: MEDICARE

## 2022-11-18 ENCOUNTER — PATIENT MESSAGE (OUTPATIENT)
Dept: HEPATOLOGY | Facility: CLINIC | Age: 67
End: 2022-11-18
Payer: MEDICARE

## 2022-11-18 DIAGNOSIS — K74.60 HEPATIC CIRRHOSIS, UNSPECIFIED HEPATIC CIRRHOSIS TYPE, UNSPECIFIED WHETHER ASCITES PRESENT: Primary | ICD-10-CM

## 2022-11-18 NOTE — PROGRESS NOTES
Can you please schedule her with hepatology for evaluation of possible cirrhosis of the liver per her abdominal US results and let patient know.  I will place orders for referral.

## 2022-11-18 NOTE — TELEPHONE ENCOUNTER
N/a nurse left detailed vm explaining the scheduling of the hepatology appt advised pt to call back with any questions or concerns.

## 2022-11-30 ENCOUNTER — PATIENT MESSAGE (OUTPATIENT)
Dept: PRIMARY CARE CLINIC | Facility: CLINIC | Age: 67
End: 2022-11-30
Payer: MEDICARE

## 2022-12-01 ENCOUNTER — LAB VISIT (OUTPATIENT)
Dept: LAB | Facility: HOSPITAL | Age: 67
End: 2022-12-01
Attending: NURSE PRACTITIONER
Payer: MEDICARE

## 2022-12-01 DIAGNOSIS — D64.9 ANEMIA, UNSPECIFIED TYPE: ICD-10-CM

## 2022-12-01 DIAGNOSIS — F10.20 ALCOHOL DEPENDENCE, UNCOMPLICATED: ICD-10-CM

## 2022-12-01 LAB
ALBUMIN SERPL BCP-MCNC: 3.9 G/DL (ref 3.5–5.2)
ALP SERPL-CCNC: 85 U/L (ref 55–135)
ALT SERPL W/O P-5'-P-CCNC: 10 U/L (ref 10–44)
ANION GAP SERPL CALC-SCNC: 15 MMOL/L (ref 8–16)
AST SERPL-CCNC: 19 U/L (ref 10–40)
BASOPHILS # BLD AUTO: 0.03 K/UL (ref 0–0.2)
BASOPHILS NFR BLD: 0.4 % (ref 0–1.9)
BILIRUB SERPL-MCNC: 0.3 MG/DL (ref 0.1–1)
BUN SERPL-MCNC: 13 MG/DL (ref 8–23)
CALCIUM SERPL-MCNC: 9.6 MG/DL (ref 8.7–10.5)
CHLORIDE SERPL-SCNC: 94 MMOL/L (ref 95–110)
CO2 SERPL-SCNC: 27 MMOL/L (ref 23–29)
CREAT SERPL-MCNC: 1.5 MG/DL (ref 0.5–1.4)
DIFFERENTIAL METHOD: ABNORMAL
EOSINOPHIL # BLD AUTO: 0.1 K/UL (ref 0–0.5)
EOSINOPHIL NFR BLD: 1.7 % (ref 0–8)
ERYTHROCYTE [DISTWIDTH] IN BLOOD BY AUTOMATED COUNT: 14.1 % (ref 11.5–14.5)
EST. GFR  (NO RACE VARIABLE): 38 ML/MIN/1.73 M^2
GLUCOSE SERPL-MCNC: 78 MG/DL (ref 70–110)
HCT VFR BLD AUTO: 33.3 % (ref 37–48.5)
HGB BLD-MCNC: 10.7 G/DL (ref 12–16)
IMM GRANULOCYTES # BLD AUTO: 0.03 K/UL (ref 0–0.04)
IMM GRANULOCYTES NFR BLD AUTO: 0.4 % (ref 0–0.5)
LYMPHOCYTES # BLD AUTO: 2 K/UL (ref 1–4.8)
LYMPHOCYTES NFR BLD: 24.3 % (ref 18–48)
MCH RBC QN AUTO: 31.4 PG (ref 27–31)
MCHC RBC AUTO-ENTMCNC: 32.1 G/DL (ref 32–36)
MCV RBC AUTO: 98 FL (ref 82–98)
MONOCYTES # BLD AUTO: 0.6 K/UL (ref 0.3–1)
MONOCYTES NFR BLD: 7.3 % (ref 4–15)
NEUTROPHILS # BLD AUTO: 5.5 K/UL (ref 1.8–7.7)
NEUTROPHILS NFR BLD: 65.9 % (ref 38–73)
NRBC BLD-RTO: 0 /100 WBC
PLATELET # BLD AUTO: 348 K/UL (ref 150–450)
PMV BLD AUTO: 10.3 FL (ref 9.2–12.9)
POTASSIUM SERPL-SCNC: 3.9 MMOL/L (ref 3.5–5.1)
PROT SERPL-MCNC: 7.5 G/DL (ref 6–8.4)
RBC # BLD AUTO: 3.41 M/UL (ref 4–5.4)
SODIUM SERPL-SCNC: 136 MMOL/L (ref 136–145)
WBC # BLD AUTO: 8.35 K/UL (ref 3.9–12.7)

## 2022-12-01 PROCEDURE — 85025 COMPLETE CBC W/AUTO DIFF WBC: CPT | Performed by: NURSE PRACTITIONER

## 2022-12-01 PROCEDURE — 80053 COMPREHEN METABOLIC PANEL: CPT | Performed by: NURSE PRACTITIONER

## 2022-12-01 PROCEDURE — 36415 COLL VENOUS BLD VENIPUNCTURE: CPT | Mod: PN | Performed by: NURSE PRACTITIONER

## 2022-12-02 ENCOUNTER — TELEPHONE (OUTPATIENT)
Dept: HEMATOLOGY/ONCOLOGY | Facility: CLINIC | Age: 67
End: 2022-12-02
Payer: MEDICARE

## 2022-12-02 DIAGNOSIS — N28.9 FUNCTION KIDNEY DECREASED: Primary | ICD-10-CM

## 2022-12-02 NOTE — TELEPHONE ENCOUNTER
----- Message from Lore Guzman NP sent at 12/2/2022  3:31 PM CST -----  Please let patient know that her kidney function has decreased.  Please ask her if she is having any UTI symptoms or if she has been taking any NSAIDS and let me know.  If taking NSAIDs please stop and we can recheck kidney function next week.  I shira  l place orders.  If UTI symptoms need a UA and will start on abx.    Lore Rincon'

## 2022-12-02 NOTE — PROGRESS NOTES
Please let patient know that her kidney function has decreased.  Please ask her if she is having any UTI symptoms or if she has been taking any NSAIDS and let me know.  If taking NSAIDs please stop and we can recheck kidney function next week.  I will place orders.  If UTI symptoms need a UA and will start on abx.    Lore Rincon'

## 2022-12-02 NOTE — TELEPHONE ENCOUNTER
Pt notified. Nurse informed pt to stop the celebrex and aspirin that she informed me that she was taking. Pt also stated her Celebrex has increased to 2xday. Nurse informed pt to stop medication until she has her visit with Lore on Monday 12/05 . Pt verbalized understanding.  Pt also had questions regarding her other labs as well. Nurse informed pt that her labs results will be discussed at her upcoming visit. Pt verbalized understanding. Call ended pt understanding.

## 2022-12-05 ENCOUNTER — OFFICE VISIT (OUTPATIENT)
Dept: HEMATOLOGY/ONCOLOGY | Facility: CLINIC | Age: 67
End: 2022-12-05
Payer: MEDICARE

## 2022-12-05 DIAGNOSIS — N28.9 FUNCTION KIDNEY DECREASED: ICD-10-CM

## 2022-12-05 DIAGNOSIS — K74.60 HEPATIC CIRRHOSIS, UNSPECIFIED HEPATIC CIRRHOSIS TYPE, UNSPECIFIED WHETHER ASCITES PRESENT: Primary | ICD-10-CM

## 2022-12-05 DIAGNOSIS — F10.20 ALCOHOL DEPENDENCE, UNCOMPLICATED: ICD-10-CM

## 2022-12-05 PROCEDURE — 1159F MED LIST DOCD IN RCRD: CPT | Mod: CPTII,95,, | Performed by: NURSE PRACTITIONER

## 2022-12-05 PROCEDURE — 99214 PR OFFICE/OUTPT VISIT, EST, LEVL IV, 30-39 MIN: ICD-10-PCS | Mod: 95,,, | Performed by: NURSE PRACTITIONER

## 2022-12-05 PROCEDURE — 99214 OFFICE O/P EST MOD 30 MIN: CPT | Mod: 95,,, | Performed by: NURSE PRACTITIONER

## 2022-12-05 PROCEDURE — 99499 RISK ADDL DX/OHS AUDIT: ICD-10-PCS | Mod: 95,,, | Performed by: NURSE PRACTITIONER

## 2022-12-05 PROCEDURE — 99499 UNLISTED E&M SERVICE: CPT | Mod: 95,,, | Performed by: NURSE PRACTITIONER

## 2022-12-05 PROCEDURE — 4010F PR ACE/ARB THEARPY RXD/TAKEN: ICD-10-PCS | Mod: CPTII,95,, | Performed by: NURSE PRACTITIONER

## 2022-12-05 PROCEDURE — 1160F RVW MEDS BY RX/DR IN RCRD: CPT | Mod: CPTII,95,, | Performed by: NURSE PRACTITIONER

## 2022-12-05 PROCEDURE — 4010F ACE/ARB THERAPY RXD/TAKEN: CPT | Mod: CPTII,95,, | Performed by: NURSE PRACTITIONER

## 2022-12-05 PROCEDURE — 1160F PR REVIEW ALL MEDS BY PRESCRIBER/CLIN PHARMACIST DOCUMENTED: ICD-10-PCS | Mod: CPTII,95,, | Performed by: NURSE PRACTITIONER

## 2022-12-05 PROCEDURE — 1101F PR PT FALLS ASSESS DOC 0-1 FALLS W/OUT INJ PAST YR: ICD-10-PCS | Mod: CPTII,95,, | Performed by: NURSE PRACTITIONER

## 2022-12-05 PROCEDURE — 3288F PR FALLS RISK ASSESSMENT DOCUMENTED: ICD-10-PCS | Mod: CPTII,95,, | Performed by: NURSE PRACTITIONER

## 2022-12-05 PROCEDURE — 3288F FALL RISK ASSESSMENT DOCD: CPT | Mod: CPTII,95,, | Performed by: NURSE PRACTITIONER

## 2022-12-05 PROCEDURE — 1101F PT FALLS ASSESS-DOCD LE1/YR: CPT | Mod: CPTII,95,, | Performed by: NURSE PRACTITIONER

## 2022-12-05 PROCEDURE — 1159F PR MEDICATION LIST DOCUMENTED IN MEDICAL RECORD: ICD-10-PCS | Mod: CPTII,95,, | Performed by: NURSE PRACTITIONER

## 2022-12-05 NOTE — PROGRESS NOTES
The patient location is: home  The chief complaint leading to consultation is: shortness of breath on exertion    Visit type: audiovisual    Face to Face time with patient: 20  45 minutes of total time spent on the encounter, which includes face to face time and non-face to face time preparing to see the patient (eg, review of tests), Obtaining and/or reviewing separately obtained history, Documenting clinical information in the electronic or other health record, Independently interpreting results (not separately reported) and communicating results to the patient/family/caregiver, or Care coordination (not separately reported).     Each patient to whom he or she provides medical services by telemedicine is:  (1) informed of the relationship between the physician and patient and the respective role of any other health care provider with respect to management of the patient; and (2) notified that he or she may decline to receive medical services by telemedicine and may withdraw from such care at any time.    Patient ID: Jasmina Sharma is a 67 y.o. female.    Chief Complaint: shortness of breath on exertion    HPI:  67 year old female who presents today as a new patient for further evaluation and recommendation of anemia.  Has required blood transfusion in the past for symptomatic anemia.       Other medical diagnosis include severe COPD, acute on chronic diastolic heart failure, HTN, HPLD, CAD, alcohol abuse, portal gastropathy, states that she is on home O2 at night 2 liters.       She presents today with labs from 10/24/2022 showing macrocytic anemia hgb 7.8 mcv 101.  Not currently iron deficient.     She is not up to date with mammogram, colonoscopy, or pap.       Just recently hospitalized for 28 days for respiratory and kidney failure.  Was intubated and confused with increased CO2 and states that she does not remember anything.  Required 3 units of blood after the traumatic intubation/extubation.  States  that she had a c diff infection during hospitalization.  She also had a large hematoma on her left inner leg.        Denies any abnormal blood loss.       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  States that she is taking folic acid daily.  States that she has been diagnosed with fatty liver disease.  Last checked about 20 years ago.      States that she used to drink a lot.  Stopped drinking end of 8/2022.  States that she was a heavy smoker.  Says that she smoked a couple of packs since she has been out of the hospital.  Started drinking when she was 56 years old when her ex and her broke up.  Would drink 1-2 bottles and wine and also rum and coke per night.  Per patient, was a very heavy drinker.    Has smoked cigarettes since she was 19 years ago smoking about 2 packs/day.       Cologuard was sent to her in the mail.    On 12/6/2022 will be schedule for mammogram.  Is being followed by pulmonology.  Had CT chest 8/2022.  No evidence of malignancy.       Denies h/o gastric surgeries in the past.     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     Family hx of cancer:  Father:  stage IV lung cancer - asbestosis     Denies f/c/ns or unintentional weight loss.  Denies any known lymphadenopathy.      Interval History  12/5/2022:  Presents today for f/u results and recommendation on anemia workup.    At last visit recommend continue folic acid supplementation daily and avoidance of etoh.  Abdominal US ordered d/t etoh use 11/16/2022 Impression:  1.  There is a slightly lobular surface to the liver.  Cirrhosis?  2.  Fatty infiltration of the pancreas.  3.  Cholelithiasis with gallbladder sludge, without ultrasound evidence for acute cholecystitis.  4.  Subcentimeter left renal cyst.  5.  Increased echogenicity to the kidneys, concerning for medical renal disease.  Clinical correlation is advised.    Hepatology consult placed for eval.      Noted decreased kidney function.  Patient was recently advised to increase Celebrex usage.  She has since reduced back down to once daily dose.  States that she is going to reschedule mammogram d/t having to have a dentist appointment d/t cracked crown.  Has not completed Cologuard testing as of now.  Has not drank alcohol in about 2-1/2 months.  Hemoglobin noted trending upward.          Social History     Socioeconomic History    Marital status:    Tobacco Use    Smoking status: Former     Types: Cigarettes, Vaping w/o nicotine     Passive exposure: Past    Smokeless tobacco: Never   Substance and Sexual Activity    Alcohol use: Not Currently    Drug use: Never    Sexual activity: Not Currently       Family History   Problem Relation Age of Onset    Hypertension Mother     Memory loss Mother     Hypertension Father     Diabetes Father     Cancer Father        Past Surgical History:   Procedure Laterality Date    BREAST SURGERY         Past Medical History:   Diagnosis Date    COPD (chronic obstructive pulmonary disease)     Encounter for blood transfusion     Hyperlipidemia     Hypertension        Review of Systems   Constitutional:  Positive for activity change, appetite change, fatigue and unexpected weight change. Negative for chills and fever.   HENT:  Positive for rhinorrhea. Negative for mouth sores and nosebleeds.    Eyes:  Negative for visual disturbance.   Respiratory:  Positive for cough and shortness of breath. Negative for chest tightness.    Cardiovascular:  Negative for chest pain and leg swelling.   Gastrointestinal:  Positive for abdominal distention. Negative for abdominal pain, anal bleeding, blood in stool, constipation, diarrhea, nausea and vomiting.   Genitourinary:  Negative for frequency, hematuria and vaginal bleeding.   Musculoskeletal:  Positive for arthralgias and gait problem. Negative for back pain.   Skin:  Positive for color change and wound. Negative for rash.    Neurological:  Positive for weakness. Negative for headaches.        Pain to bilateral feet/numbness   Hematological:  Negative for adenopathy. Bruises/bleeds easily.   Psychiatric/Behavioral:  Negative for confusion. The patient is nervous/anxious.         Medication List with Changes/Refills   Current Medications    ALBUTEROL (PROVENTIL/VENTOLIN HFA) 90 MCG/ACTUATION INHALER    Inhale 2 puffs into the lungs every 6 (six) hours as needed.    ALBUTEROL-IPRATROPIUM (DUO-NEB) 2.5 MG-0.5 MG/3 ML NEBULIZER SOLUTION    Take by nebulization every 6 (six) hours as needed.    ALLOPURINOL (ZYLOPRIM) 300 MG TABLET    Take 1 tablet (300 mg total) by mouth once daily.    BUMETANIDE (BUMEX) 1 MG TABLET    Take 0.5 tablets (0.5 mg total) by mouth every other day.    CELECOXIB (CELEBREX) 200 MG CAPSULE    Take 1 capsule (200 mg total) by mouth 2 (two) times daily.    CITALOPRAM (CELEXA) 40 MG TABLET    Take 1 tablet (40 mg total) by mouth once daily.    CLONAZEPAM (KLONOPIN) 0.5 MG TABLET    Take 0.25 mg by mouth nightly as needed.    FOLIC ACID (FOLVITE) 1 MG TABLET    Take 1 tablet (1,000 mcg total) by mouth every morning.    GABAPENTIN (NEURONTIN) 400 MG CAPSULE    gabapentin 400 mg capsule   TAKE ONE CAPSULE THREE TIMES A DAY (IN THE MORNING, AT NOON, AND AT BEDTIME)    HYDROCODONE-ACETAMINOPHEN (NORCO) 7.5-325 MG PER TABLET    SMARTSI Tablet(s) By Mouth Every 12 Hours PRN    LORATADINE (CLARITIN) 10 MG TABLET    Take 1 tablet (10 mg total) by mouth once daily.    LOSARTAN (COZAAR) 25 MG TABLET    Take 1 tablet (25 mg total) by mouth once daily.    METOPROLOL SUCCINATE (TOPROL-XL) 25 MG 24 HR TABLET    Take 1 tablet (25 mg total) by mouth once daily.    MIRTAZAPINE (REMERON) 15 MG TABLET    Take 1 tablet (15 mg total) by mouth nightly as needed (sleep).    NALOXONE (NARCAN) 4 MG/ACTUATION SPRY    naloxone 4 mg/actuation nasal spray   SPRAY 1 SPRAY INTO ONE NOSTRIL REPEAT ANOTHER INTO OPPOSITE NOSTRIL IN 2 TO 3 MINUTES  UNTIL PT RESPONSIVE OR HELP ARRIVES    PANTOPRAZOLE (PROTONIX) 40 MG TABLET    Take 1 tablet (40 mg total) by mouth once daily.    PRAMIPEXOLE (MIRAPEX) 0.75 MG TABLET    pramipexole 0.75 mg tablet   TAKE 1 TABLET BY MOUTH AT BEDTIME FOR RESTLESS LEGS    PREGABALIN (LYRICA) 50 MG CAPSULE    Take 50 mg by mouth 3 (three) times daily as needed.    ROSUVASTATIN (CRESTOR) 10 MG TABLET    Take 1 tablet (10 mg total) by mouth every evening.    TRELEGY ELLIPTA 100-62.5-25 MCG DSDV    SMARTSI Inhalation Via Inhaler Daily        Objective:   There were no vitals filed for this visit.    Physical Exam  Constitutional:       Appearance: Normal appearance.   Pulmonary:      Effort: Pulmonary effort is normal.   Neurological:      Mental Status: She is alert and oriented to person, place, and time.   Psychiatric:         Mood and Affect: Mood normal.         Behavior: Behavior normal.         Thought Content: Thought content normal.         Judgment: Judgment normal.       Assessment:     Problem List Items Addressed This Visit    None  Visit Diagnoses       Hepatic cirrhosis, unspecified hepatic cirrhosis type, unspecified whether ascites present    -  Primary    Relevant Orders    CBC Auto Differential    Comprehensive Metabolic Panel    CBC Auto Differential    Function kidney decreased        Relevant Orders    CBC Auto Differential    Comprehensive Metabolic Panel    CBC Auto Differential    Alcohol dependence, uncomplicated        Relevant Orders    CBC Auto Differential    Comprehensive Metabolic Panel    CBC Auto Differential            Lab Results   Component Value Date    WBC 8.35 2022    RBC 3.41 (L) 2022    HGB 10.7 (L) 2022    HCT 33.3 (L) 2022    MCV 98 2022    MCH 31.4 (H) 2022    MCHC 32.1 2022    RDW 14.1 2022     2022    MPV 10.3 2022    GRAN 5.5 2022    GRAN 65.9 2022    LYMPH 2.0 2022    LYMPH 24.3 2022    MONO 0.6  12/01/2022    MONO 7.3 12/01/2022    EOS 0.1 12/01/2022    BASO 0.03 12/01/2022    EOSINOPHIL 1.7 12/01/2022    BASOPHIL 0.4 12/01/2022      Lab Results   Component Value Date     12/01/2022    K 3.9 12/01/2022    CL 94 (L) 12/01/2022    CO2 27 12/01/2022    BUN 13 12/01/2022    CREATININE 1.5 (H) 12/01/2022    CALCIUM 9.6 12/01/2022    ANIONGAP 15 12/01/2022     Lab Results   Component Value Date    ALT 10 12/01/2022    AST 19 12/01/2022    ALKPHOS 85 12/01/2022    BILITOT 0.3 12/01/2022     Lab Results   Component Value Date    IRON 85 11/01/2022    TRANSFERRIN 194 (L) 11/01/2022    TIBC 287 11/01/2022    FESATURATED 30 11/01/2022      Lab Results   Component Value Date    FERRITIN 867 (H) 11/01/2022     Lab Results   Component Value Date    CXTJCMBU11 771 11/03/2022     Lab Results   Component Value Date    FOLATE 16.7 11/03/2022     Lab Results   Component Value Date    TSH 2.207 10/24/2022     Plan:   Hepatic cirrhosis, unspecified hepatic cirrhosis type, unspecified whether ascites present  -     CBC Auto Differential; Future; Expected date: 12/05/2022  -     Comprehensive Metabolic Panel; Future; Expected date: 12/05/2022  -     CBC Auto Differential; Future; Expected date: 12/05/2022    Function kidney decreased  -     CBC Auto Differential; Future; Expected date: 12/05/2022  -     Comprehensive Metabolic Panel; Future; Expected date: 12/05/2022  -     CBC Auto Differential; Future; Expected date: 12/05/2022    Alcohol dependence, uncomplicated  -     CBC Auto Differential; Future; Expected date: 12/05/2022  -     Comprehensive Metabolic Panel; Future; Expected date: 12/05/2022  -     CBC Auto Differential; Future; Expected date: 12/05/2022    Med Onc Chart Routing      Follow up with physician    Follow up with KEITH 2 months. Will repeat RFTs on Thursday in [place].Repeat CBC in 1 month in [place]. F/u in 2 months with cbc, cmp with virtual visit after to discuss results.  Patient states would like  her visits to be after lunch.   Infusion scheduling note n/a   Injection scheduling note n/a   Labs   Lab interval:  See above   Imaging   N/a   Pharmacy appointment No pharmacy appointment needed      Other referrals No additional referrals needed          Will repeat RFTs on Thursday in [place].  Repeat CBC in 1 month in [place]. F/u in 2 months with cbc, cmp with virtual visit after to discuss results.  Patient states would like her visits to be after lunch.    Patient states that she would like to see a nephrologist and hepatologist at [place] in [place] rather then coming to [place].      Collaborating Provider:  Dr. Jose Beltran    Thank You,  Sundar Guzman, JASYONP-C  Hematology Oncology

## 2022-12-07 ENCOUNTER — TELEPHONE (OUTPATIENT)
Dept: PRIMARY CARE CLINIC | Facility: CLINIC | Age: 67
End: 2022-12-07
Payer: MEDICARE

## 2022-12-07 ENCOUNTER — PATIENT MESSAGE (OUTPATIENT)
Dept: PRIMARY CARE CLINIC | Facility: CLINIC | Age: 67
End: 2022-12-07
Payer: MEDICARE

## 2022-12-07 NOTE — TELEPHONE ENCOUNTER
----- Message from Alva Brown sent at 12/7/2022  3:42 PM CST -----  Contact: [phone]  Patient would like to consult with a nurse in regards to orders for a chest xray. The patient stated she has be experiencing shortness of breath. Please give her a call back at  [phone]. Thanks cally

## 2022-12-08 ENCOUNTER — PATIENT MESSAGE (OUTPATIENT)
Dept: HEMATOLOGY/ONCOLOGY | Facility: CLINIC | Age: 67
End: 2022-12-08
Payer: MEDICARE

## 2022-12-08 DIAGNOSIS — R05.1 ACUTE COUGH: Primary | ICD-10-CM

## 2022-12-09 ENCOUNTER — PATIENT MESSAGE (OUTPATIENT)
Dept: HEMATOLOGY/ONCOLOGY | Facility: CLINIC | Age: 67
End: 2022-12-09
Payer: MEDICARE

## 2022-12-09 ENCOUNTER — HOSPITAL ENCOUNTER (OUTPATIENT)
Dept: RADIOLOGY | Facility: HOSPITAL | Age: 67
Discharge: HOME | End: 2022-12-09
Attending: NURSE PRACTITIONER
Payer: MEDICARE

## 2022-12-09 DIAGNOSIS — R05.1 ACUTE COUGH: ICD-10-CM

## 2022-12-09 PROCEDURE — 71046 X-RAY EXAM CHEST 2 VIEWS: CPT | Mod: TC,PN

## 2022-12-30 ENCOUNTER — PATIENT MESSAGE (OUTPATIENT)
Dept: PRIMARY CARE CLINIC | Facility: CLINIC | Age: 67
End: 2022-12-30
Payer: MEDICARE

## 2023-01-03 RX ORDER — PREGABALIN 50 MG/1
50 CAPSULE ORAL 3 TIMES DAILY PRN
OUTPATIENT
Start: 2023-01-03

## 2023-01-05 ENCOUNTER — PATIENT MESSAGE (OUTPATIENT)
Dept: PRIMARY CARE CLINIC | Facility: CLINIC | Age: 68
End: 2023-01-05
Payer: MEDICARE

## 2023-01-06 ENCOUNTER — LAB VISIT (OUTPATIENT)
Dept: LAB | Facility: HOSPITAL | Age: 68
End: 2023-01-06
Attending: NURSE PRACTITIONER
Payer: MEDICARE

## 2023-01-06 DIAGNOSIS — K74.60 HEPATIC CIRRHOSIS, UNSPECIFIED HEPATIC CIRRHOSIS TYPE, UNSPECIFIED WHETHER ASCITES PRESENT: ICD-10-CM

## 2023-01-06 DIAGNOSIS — N28.9 FUNCTION KIDNEY DECREASED: ICD-10-CM

## 2023-01-06 DIAGNOSIS — F10.20 ALCOHOL DEPENDENCE, UNCOMPLICATED: ICD-10-CM

## 2023-01-06 PROCEDURE — 85025 COMPLETE CBC W/AUTO DIFF WBC: CPT | Performed by: NURSE PRACTITIONER

## 2023-01-06 PROCEDURE — 36415 COLL VENOUS BLD VENIPUNCTURE: CPT | Mod: PN | Performed by: NURSE PRACTITIONER

## 2023-01-07 LAB
BASOPHILS # BLD AUTO: 0.01 K/UL (ref 0–0.2)
BASOPHILS NFR BLD: 0.3 % (ref 0–1.9)
DIFFERENTIAL METHOD: ABNORMAL
EOSINOPHIL # BLD AUTO: 0.1 K/UL (ref 0–0.5)
EOSINOPHIL NFR BLD: 4 % (ref 0–8)
ERYTHROCYTE [DISTWIDTH] IN BLOOD BY AUTOMATED COUNT: 13.7 % (ref 11.5–14.5)
HCT VFR BLD AUTO: 36 % (ref 37–48.5)
HGB BLD-MCNC: 11 G/DL (ref 12–16)
IMM GRANULOCYTES # BLD AUTO: 0.01 K/UL (ref 0–0.04)
IMM GRANULOCYTES NFR BLD AUTO: 0.3 % (ref 0–0.5)
LYMPHOCYTES # BLD AUTO: 1.1 K/UL (ref 1–4.8)
LYMPHOCYTES NFR BLD: 32.4 % (ref 18–48)
MCH RBC QN AUTO: 29.8 PG (ref 27–31)
MCHC RBC AUTO-ENTMCNC: 30.6 G/DL (ref 32–36)
MCV RBC AUTO: 98 FL (ref 82–98)
MONOCYTES # BLD AUTO: 0.4 K/UL (ref 0.3–1)
MONOCYTES NFR BLD: 12.3 % (ref 4–15)
NEUTROPHILS # BLD AUTO: 1.8 K/UL (ref 1.8–7.7)
NEUTROPHILS NFR BLD: 50.7 % (ref 38–73)
NRBC BLD-RTO: 0 /100 WBC
PLATELET # BLD AUTO: 178 K/UL (ref 150–450)
PMV BLD AUTO: 11.5 FL (ref 9.2–12.9)
RBC # BLD AUTO: 3.69 M/UL (ref 4–5.4)
WBC # BLD AUTO: 3.49 K/UL (ref 3.9–12.7)

## 2023-01-09 ENCOUNTER — PATIENT MESSAGE (OUTPATIENT)
Dept: PRIMARY CARE CLINIC | Facility: CLINIC | Age: 68
End: 2023-01-09
Payer: MEDICARE

## 2023-01-17 ENCOUNTER — PATIENT MESSAGE (OUTPATIENT)
Dept: PRIMARY CARE CLINIC | Facility: CLINIC | Age: 68
End: 2023-01-17
Payer: MEDICARE

## 2023-01-20 ENCOUNTER — TELEPHONE (OUTPATIENT)
Dept: PRIMARY CARE CLINIC | Facility: CLINIC | Age: 68
End: 2023-01-20
Payer: MEDICARE

## 2023-01-20 ENCOUNTER — PATIENT MESSAGE (OUTPATIENT)
Dept: GASTROENTEROLOGY | Facility: CLINIC | Age: 68
End: 2023-01-20
Payer: MEDICARE

## 2023-01-25 ENCOUNTER — PATIENT MESSAGE (OUTPATIENT)
Dept: ADMINISTRATIVE | Facility: HOSPITAL | Age: 68
End: 2023-01-25
Payer: MEDICARE

## 2023-02-10 ENCOUNTER — TELEPHONE (OUTPATIENT)
Dept: HEMATOLOGY/ONCOLOGY | Facility: CLINIC | Age: 68
End: 2023-02-10
Payer: MEDICARE

## 2023-02-10 NOTE — TELEPHONE ENCOUNTER
N/a nurse left detailed vm informing pt of lab appt on 2/10 nurse also informed pt that if she is unable to make it to her visit to give us a call in regards to rescheduling her appt.

## 2023-02-10 NOTE — TELEPHONE ENCOUNTER
----- Message from Radha Ulloa sent at 2/10/2023  3:00 PM CST -----  Contact: Jasmina  Type:  Patient Returning Call    Who Called: Jasmina  Who Left Message for Patient: Vijaya  Does the patient know what this is regarding?: rescheduling since she could not do labs today  Would the patient rather a call back or a response via [facility]? call  Best Call Back Number: [phone] (home)    Additional Information:  Jasmina returned the missed call today and would like to be contacted again today please

## 2023-02-10 NOTE — TELEPHONE ENCOUNTER
Nurse spoke with pt in regards to rescheduling her appt.pt has been rescheduled and verbalized understanding.

## 2023-02-14 ENCOUNTER — LAB VISIT (OUTPATIENT)
Dept: LAB | Facility: HOSPITAL | Age: 68
End: 2023-02-14
Attending: NURSE PRACTITIONER
Payer: MEDICARE

## 2023-02-14 DIAGNOSIS — K74.60 HEPATIC CIRRHOSIS, UNSPECIFIED HEPATIC CIRRHOSIS TYPE, UNSPECIFIED WHETHER ASCITES PRESENT: ICD-10-CM

## 2023-02-14 DIAGNOSIS — N28.9 FUNCTION KIDNEY DECREASED: ICD-10-CM

## 2023-02-14 DIAGNOSIS — F10.20 ALCOHOL DEPENDENCE, UNCOMPLICATED: ICD-10-CM

## 2023-02-14 LAB
ALBUMIN SERPL BCP-MCNC: 3.6 G/DL (ref 3.5–5.2)
ALP SERPL-CCNC: 79 U/L (ref 55–135)
ALT SERPL W/O P-5'-P-CCNC: 11 U/L (ref 10–44)
ANION GAP SERPL CALC-SCNC: 12 MMOL/L (ref 8–16)
AST SERPL-CCNC: 18 U/L (ref 10–40)
BASOPHILS # BLD AUTO: 0.02 K/UL (ref 0–0.2)
BASOPHILS NFR BLD: 0.2 % (ref 0–1.9)
BILIRUB SERPL-MCNC: 0.2 MG/DL (ref 0.1–1)
BUN SERPL-MCNC: 32 MG/DL (ref 8–23)
CALCIUM SERPL-MCNC: 9 MG/DL (ref 8.7–10.5)
CHLORIDE SERPL-SCNC: 101 MMOL/L (ref 95–110)
CO2 SERPL-SCNC: 26 MMOL/L (ref 23–29)
CREAT SERPL-MCNC: 1.5 MG/DL (ref 0.5–1.4)
DIFFERENTIAL METHOD: ABNORMAL
EOSINOPHIL # BLD AUTO: 0.1 K/UL (ref 0–0.5)
EOSINOPHIL NFR BLD: 0.9 % (ref 0–8)
ERYTHROCYTE [DISTWIDTH] IN BLOOD BY AUTOMATED COUNT: 15.6 % (ref 11.5–14.5)
EST. GFR  (NO RACE VARIABLE): 38 ML/MIN/1.73 M^2
GLUCOSE SERPL-MCNC: 86 MG/DL (ref 70–110)
HCT VFR BLD AUTO: 33.2 % (ref 37–48.5)
HGB BLD-MCNC: 10.5 G/DL (ref 12–16)
IMM GRANULOCYTES # BLD AUTO: 0.03 K/UL (ref 0–0.04)
IMM GRANULOCYTES NFR BLD AUTO: 0.2 % (ref 0–0.5)
LYMPHOCYTES # BLD AUTO: 3.1 K/UL (ref 1–4.8)
LYMPHOCYTES NFR BLD: 25.4 % (ref 18–48)
MCH RBC QN AUTO: 30.1 PG (ref 27–31)
MCHC RBC AUTO-ENTMCNC: 31.6 G/DL (ref 32–36)
MCV RBC AUTO: 95 FL (ref 82–98)
MONOCYTES # BLD AUTO: 0.8 K/UL (ref 0.3–1)
MONOCYTES NFR BLD: 6.5 % (ref 4–15)
NEUTROPHILS # BLD AUTO: 8 K/UL (ref 1.8–7.7)
NEUTROPHILS NFR BLD: 66.8 % (ref 38–73)
NRBC BLD-RTO: 0 /100 WBC
PLATELET # BLD AUTO: 244 K/UL (ref 150–450)
PMV BLD AUTO: 11.2 FL (ref 9.2–12.9)
POTASSIUM SERPL-SCNC: 4.3 MMOL/L (ref 3.5–5.1)
PROT SERPL-MCNC: 6.9 G/DL (ref 6–8.4)
RBC # BLD AUTO: 3.49 M/UL (ref 4–5.4)
SODIUM SERPL-SCNC: 139 MMOL/L (ref 136–145)
WBC # BLD AUTO: 12.04 K/UL (ref 3.9–12.7)

## 2023-02-14 PROCEDURE — 36415 COLL VENOUS BLD VENIPUNCTURE: CPT | Mod: PN | Performed by: NURSE PRACTITIONER

## 2023-02-14 PROCEDURE — 80053 COMPREHEN METABOLIC PANEL: CPT | Performed by: NURSE PRACTITIONER

## 2023-02-14 PROCEDURE — 85025 COMPLETE CBC W/AUTO DIFF WBC: CPT | Performed by: NURSE PRACTITIONER

## 2023-02-16 ENCOUNTER — OFFICE VISIT (OUTPATIENT)
Dept: HEMATOLOGY/ONCOLOGY | Facility: CLINIC | Age: 68
End: 2023-02-16
Payer: MEDICARE

## 2023-02-16 VITALS
HEART RATE: 76 BPM | HEIGHT: 62 IN | DIASTOLIC BLOOD PRESSURE: 74 MMHG | BODY MASS INDEX: 18.62 KG/M2 | OXYGEN SATURATION: 93 % | SYSTOLIC BLOOD PRESSURE: 111 MMHG | WEIGHT: 101.19 LBS

## 2023-02-16 DIAGNOSIS — K74.60 HEPATIC CIRRHOSIS, UNSPECIFIED HEPATIC CIRRHOSIS TYPE: Primary | ICD-10-CM

## 2023-02-16 DIAGNOSIS — D72.828 GRANULOCYTOSIS: ICD-10-CM

## 2023-02-16 DIAGNOSIS — E86.0 DEHYDRATION: ICD-10-CM

## 2023-02-16 DIAGNOSIS — R05.3 CHRONIC COUGH: ICD-10-CM

## 2023-02-16 DIAGNOSIS — N18.31 STAGE 3A CHRONIC KIDNEY DISEASE: ICD-10-CM

## 2023-02-16 DIAGNOSIS — N28.9 FUNCTION KIDNEY DECREASED: ICD-10-CM

## 2023-02-16 PROCEDURE — 99499 RISK ADDL DX/OHS AUDIT: ICD-10-PCS | Mod: S$GLB,,, | Performed by: NURSE PRACTITIONER

## 2023-02-16 PROCEDURE — 3288F PR FALLS RISK ASSESSMENT DOCUMENTED: ICD-10-PCS | Mod: CPTII,S$GLB,, | Performed by: NURSE PRACTITIONER

## 2023-02-16 PROCEDURE — 1160F RVW MEDS BY RX/DR IN RCRD: CPT | Mod: CPTII,S$GLB,, | Performed by: NURSE PRACTITIONER

## 2023-02-16 PROCEDURE — 1100F PTFALLS ASSESS-DOCD GE2>/YR: CPT | Mod: CPTII,S$GLB,, | Performed by: NURSE PRACTITIONER

## 2023-02-16 PROCEDURE — 3008F BODY MASS INDEX DOCD: CPT | Mod: CPTII,S$GLB,, | Performed by: NURSE PRACTITIONER

## 2023-02-16 PROCEDURE — 1159F PR MEDICATION LIST DOCUMENTED IN MEDICAL RECORD: ICD-10-PCS | Mod: CPTII,S$GLB,, | Performed by: NURSE PRACTITIONER

## 2023-02-16 PROCEDURE — 1100F PR PT FALLS ASSESS DOC 2+ FALLS/FALL W/INJURY/YR: ICD-10-PCS | Mod: CPTII,S$GLB,, | Performed by: NURSE PRACTITIONER

## 2023-02-16 PROCEDURE — 3008F PR BODY MASS INDEX (BMI) DOCUMENTED: ICD-10-PCS | Mod: CPTII,S$GLB,, | Performed by: NURSE PRACTITIONER

## 2023-02-16 PROCEDURE — 4010F PR ACE/ARB THEARPY RXD/TAKEN: ICD-10-PCS | Mod: CPTII,S$GLB,, | Performed by: NURSE PRACTITIONER

## 2023-02-16 PROCEDURE — 99499 UNLISTED E&M SERVICE: CPT | Mod: S$GLB,,, | Performed by: NURSE PRACTITIONER

## 2023-02-16 PROCEDURE — 3288F FALL RISK ASSESSMENT DOCD: CPT | Mod: CPTII,S$GLB,, | Performed by: NURSE PRACTITIONER

## 2023-02-16 PROCEDURE — 3078F DIAST BP <80 MM HG: CPT | Mod: CPTII,S$GLB,, | Performed by: NURSE PRACTITIONER

## 2023-02-16 PROCEDURE — 99215 PR OFFICE/OUTPT VISIT, EST, LEVL V, 40-54 MIN: ICD-10-PCS | Mod: S$GLB,,, | Performed by: NURSE PRACTITIONER

## 2023-02-16 PROCEDURE — 99999 PR PBB SHADOW E&M-EST. PATIENT-LVL V: ICD-10-PCS | Mod: PBBFAC,,, | Performed by: NURSE PRACTITIONER

## 2023-02-16 PROCEDURE — 1159F MED LIST DOCD IN RCRD: CPT | Mod: CPTII,S$GLB,, | Performed by: NURSE PRACTITIONER

## 2023-02-16 PROCEDURE — 99215 OFFICE O/P EST HI 40 MIN: CPT | Mod: S$GLB,,, | Performed by: NURSE PRACTITIONER

## 2023-02-16 PROCEDURE — 1160F PR REVIEW ALL MEDS BY PRESCRIBER/CLIN PHARMACIST DOCUMENTED: ICD-10-PCS | Mod: CPTII,S$GLB,, | Performed by: NURSE PRACTITIONER

## 2023-02-16 PROCEDURE — 3074F PR MOST RECENT SYSTOLIC BLOOD PRESSURE < 130 MM HG: ICD-10-PCS | Mod: CPTII,S$GLB,, | Performed by: NURSE PRACTITIONER

## 2023-02-16 PROCEDURE — 3078F PR MOST RECENT DIASTOLIC BLOOD PRESSURE < 80 MM HG: ICD-10-PCS | Mod: CPTII,S$GLB,, | Performed by: NURSE PRACTITIONER

## 2023-02-16 PROCEDURE — 1126F AMNT PAIN NOTED NONE PRSNT: CPT | Mod: CPTII,S$GLB,, | Performed by: NURSE PRACTITIONER

## 2023-02-16 PROCEDURE — 99999 PR PBB SHADOW E&M-EST. PATIENT-LVL V: CPT | Mod: PBBFAC,,, | Performed by: NURSE PRACTITIONER

## 2023-02-16 PROCEDURE — 4010F ACE/ARB THERAPY RXD/TAKEN: CPT | Mod: CPTII,S$GLB,, | Performed by: NURSE PRACTITIONER

## 2023-02-16 PROCEDURE — 3074F SYST BP LT 130 MM HG: CPT | Mod: CPTII,S$GLB,, | Performed by: NURSE PRACTITIONER

## 2023-02-16 PROCEDURE — 1126F PR PAIN SEVERITY QUANTIFIED, NO PAIN PRESENT: ICD-10-PCS | Mod: CPTII,S$GLB,, | Performed by: NURSE PRACTITIONER

## 2023-02-16 RX ORDER — PREDNISONE 5 MG/1
5 TABLET ORAL
COMMUNITY
Start: 2023-01-26 | End: 2023-03-01 | Stop reason: ALTCHOICE

## 2023-02-16 RX ORDER — SULFAMETHOXAZOLE AND TRIMETHOPRIM 800; 160 MG/1; MG/1
1 TABLET ORAL 2 TIMES DAILY
COMMUNITY
End: 2023-03-01 | Stop reason: ALTCHOICE

## 2023-02-16 RX ORDER — GLUCOSAMINE/CHONDRO SU A 500-400 MG
1 TABLET ORAL 3 TIMES DAILY
COMMUNITY
End: 2023-04-17

## 2023-02-16 NOTE — PROGRESS NOTES
Subjective:      Patient ID: Jasmina Sharma is a 67 y.o. female.    Chief Complaint: lab discussion    HPI:   67 year old female who presents today as a new patient for further evaluation and recommendation of anemia.  Has required blood transfusion in the past for symptomatic anemia.       Other medical diagnosis include severe COPD, acute on chronic diastolic heart failure, HTN, HPLD, CAD, alcohol abuse, portal gastropathy, states that she is on home O2 at night 2 liters.       She presents today with labs from 10/24/2022 showing macrocytic anemia hgb 7.8 mcv 101.  Not currently iron deficient.     She is not up to date with mammogram, colonoscopy, or pap.       Just recently hospitalized for 28 days for respiratory and kidney failure.  Was intubated and confused with increased CO2 and states that she does not remember anything.  Required 3 units of blood after the traumatic intubation/extubation.  States that she had a c diff infection during hospitalization.  She also had a large hematoma on her left inner leg.        Denies any abnormal blood loss.       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  States that she is taking folic acid daily.  States that she has been diagnosed with fatty liver disease.  Last checked about 20 years ago.      States that she used to drink a lot.  Stopped drinking end of 8/2022.  States that she was a heavy smoker.  Says that she smoked a couple of packs since she has been out of the hospital.  Started drinking when she was 56 years old when her ex and her broke up.  Would drink 1-2 bottles and wine and also rum and coke per night.  Per patient, was a very heavy drinker.    Has smoked cigarettes since she was 19 years.  smoking about 2 packs/day.       Cologuard was sent to her in the mail.    On 12/6/2022 will be schedule for mammogram.  Is being followed by pulmonology.  Had CT chest 8/2022.  No evidence of malignancy.       Denies h/o gastric  surgeries in the past.     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     Family hx of cancer:  Father:  stage IV lung cancer - asbestosis     Denies f/c/ns or unintentional weight loss.  Denies any known lymphadenopathy.       Interval History  12/5/2022:  Presents today for f/u results and recommendation on anemia workup.    At last visit recommend continue folic acid supplementation daily and avoidance of etoh.  Abdominal US ordered d/t etoh use 11/16/2022 Impression:  1.  There is a slightly lobular surface to the liver.  Cirrhosis?  2.  Fatty infiltration of the pancreas.  3.  Cholelithiasis with gallbladder sludge, without ultrasound evidence for acute cholecystitis.  4.  Subcentimeter left renal cyst.  5.  Increased echogenicity to the kidneys, concerning for medical renal disease.  Clinical correlation is advised.     Hepatology consult placed for eval.      Noted decreased kidney function.  Patient was recently advised to increase Celebrex usage.  She has since reduced back down to once daily dose.  States that she is going to reschedule mammogram d/t having to have a dentist appointment d/t cracked crown.  Has not completed Cologuard testing as of now.  Has not drank alcohol in about 2-1/2 months.  Hemoglobin noted trending upward.     2/16/2023  States that has had a cold for 2 months and has been placed on steroid for a month.  Has increased bruising with skin tear to left hand.  States that she would be willing to reschedule with hepatology for evaluation.  Denies urinary infection symptoms currently.  Taking bumex every other day.  States that probably not  drinking enough fluid.  States that she no longer drinks alcohol.  Has normocytic anemia with normal differential.  States that she has an appointment with nephrology upcoming.  Will try to complete cologuard testing and send in per patient.      Social History     Socioeconomic History    Marital  status:    Tobacco Use    Smoking status: Former     Types: Cigarettes, Vaping w/o nicotine     Passive exposure: Past    Smokeless tobacco: Never   Substance and Sexual Activity    Alcohol use: Not Currently    Drug use: Never    Sexual activity: Not Currently       Family History   Problem Relation Age of Onset    Hypertension Mother     Memory loss Mother     Hypertension Father     Diabetes Father     Cancer Father        Past Surgical History:   Procedure Laterality Date    BREAST SURGERY         Past Medical History:   Diagnosis Date    COPD (chronic obstructive pulmonary disease)     Encounter for blood transfusion     Hyperlipidemia     Hypertension        Review of Systems   Constitutional:  Positive for unexpected weight change (has lost weight). Negative for appetite change.   HENT:  Positive for congestion. Negative for mouth sores.    Eyes:  Negative for visual disturbance.   Respiratory:  Positive for cough and shortness of breath.    Cardiovascular:  Negative for chest pain.   Gastrointestinal:  Negative for abdominal pain and diarrhea.   Genitourinary:  Negative for frequency.   Musculoskeletal:  Positive for back pain.   Skin:  Negative for rash.   Neurological:  Negative for headaches.        Disequilibrium   Hematological:  Negative for adenopathy. Bruises/bleeds easily.   Psychiatric/Behavioral:  The patient is nervous/anxious.         Wobbly - states that PT did not help  Has had several falls        Medication List with Changes/Refills   Current Medications    ALBUTEROL (PROVENTIL/VENTOLIN HFA) 90 MCG/ACTUATION INHALER    Inhale 2 puffs into the lungs every 6 (six) hours as needed.    ALBUTEROL-IPRATROPIUM (DUO-NEB) 2.5 MG-0.5 MG/3 ML NEBULIZER SOLUTION    Take by nebulization every 6 (six) hours as needed.    ALLOPURINOL (ZYLOPRIM) 300 MG TABLET    Take 1 tablet (300 mg total) by mouth once daily.    BUMETANIDE (BUMEX) 1 MG TABLET    Take 0.5 tablets (0.5 mg total) by mouth every other  day.    CELECOXIB (CELEBREX) 200 MG CAPSULE    Take 1 capsule (200 mg total) by mouth 2 (two) times daily.    CITALOPRAM (CELEXA) 40 MG TABLET    Take 1 tablet (40 mg total) by mouth once daily.    CLONAZEPAM (KLONOPIN) 0.5 MG TABLET    Take 0.25 mg by mouth nightly as needed.    FOLIC ACID (FOLVITE) 1 MG TABLET    Take 1 tablet (1,000 mcg total) by mouth every morning.    GABAPENTIN (NEURONTIN) 400 MG CAPSULE    gabapentin 400 mg capsule   TAKE ONE CAPSULE THREE TIMES A DAY (IN THE MORNING, AT NOON, AND AT BEDTIME)    GLUCOSAMINE-CHONDROITIN 500-400 MG TABLET    Take 1 tablet by mouth 3 (three) times daily.    HYDROCODONE-ACETAMINOPHEN (NORCO) 7.5-325 MG PER TABLET    SMARTSI Tablet(s) By Mouth Every 12 Hours PRN    LORATADINE (CLARITIN) 10 MG TABLET    Take 1 tablet (10 mg total) by mouth once daily.    LOSARTAN (COZAAR) 25 MG TABLET    Take 1 tablet (25 mg total) by mouth once daily.    METOPROLOL SUCCINATE (TOPROL-XL) 25 MG 24 HR TABLET    Take 1 tablet (25 mg total) by mouth once daily.    MIRTAZAPINE (REMERON) 15 MG TABLET    Take 1 tablet (15 mg total) by mouth nightly as needed (sleep).    NALOXONE (NARCAN) 4 MG/ACTUATION SPRY    naloxone 4 mg/actuation nasal spray   SPRAY 1 SPRAY INTO ONE NOSTRIL REPEAT ANOTHER INTO OPPOSITE NOSTRIL IN 2 TO 3 MINUTES UNTIL PT RESPONSIVE OR HELP ARRIVES    PANTOPRAZOLE (PROTONIX) 40 MG TABLET    Take 1 tablet (40 mg total) by mouth once daily.    PRAMIPEXOLE (MIRAPEX) 0.75 MG TABLET    pramipexole 0.75 mg tablet   TAKE 1 TABLET BY MOUTH AT BEDTIME FOR RESTLESS LEGS    PREDNISONE (DELTASONE) 5 MG TABLET    Take 5 mg by mouth.    PREGABALIN (LYRICA) 50 MG CAPSULE    Take 50 mg by mouth 3 (three) times daily as needed.    ROSUVASTATIN (CRESTOR) 10 MG TABLET    Take 1 tablet (10 mg total) by mouth every evening.    SULFAMETHOXAZOLE-TRIMETHOPRIM 800-160MG (BACTRIM DS) 800-160 MG TAB    Take 1 tablet by mouth 2 (two) times daily.    TRELEGY ELLIPTA 100-62.5-25 MCG DSDV     SMARTSI Inhalation Via Inhaler Daily        Objective:     Vitals:    23 1322   BP: 111/74   Pulse: 76       Physical Exam  Vitals reviewed.   Constitutional:       Appearance: Normal appearance.   HENT:      Head: Normocephalic and atraumatic.      Right Ear: External ear normal.      Left Ear: External ear normal.   Cardiovascular:      Rate and Rhythm: Normal rate and regular rhythm.      Heart sounds: Normal heart sounds, S1 normal and S2 normal.   Pulmonary:      Effort: Pulmonary effort is normal.      Breath sounds: Normal breath sounds.   Abdominal:      General: There is no distension.   Musculoskeletal:         General: Normal range of motion.      Cervical back: Normal range of motion.   Skin:     General: Skin is warm and dry.      Findings: Ecchymosis present.   Neurological:      General: No focal deficit present.      Mental Status: She is alert and oriented to person, place, and time.   Psychiatric:         Attention and Perception: Attention and perception normal.         Mood and Affect: Mood and affect normal.         Speech: Speech normal.         Behavior: Behavior normal. Behavior is cooperative.         Thought Content: Thought content normal.         Cognition and Memory: Cognition and memory normal.         Judgment: Judgment normal.       Assessment:     Problem List Items Addressed This Visit          Renal/    Stage 3a chronic kidney disease    Relevant Orders    CBC Auto Differential    Comprehensive Metabolic Panel    Comprehensive Metabolic Panel       GI    Hepatic cirrhosis, unspecified hepatic cirrhosis type - Primary    Relevant Orders    Ambulatory referral/consult to Hepatology    Comprehensive Metabolic Panel    Comprehensive Metabolic Panel     Other Visit Diagnoses       Granulocytosis        Relevant Orders    Urinalysis, Reflex to Urine Culture Urine, Clean Catch    CBC Auto Differential    CBC Auto Differential    Function kidney decreased        Relevant Orders     Urinalysis, Reflex to Urine Culture Urine, Clean Catch    Comprehensive Metabolic Panel    Comprehensive Metabolic Panel    Chronic cough        Relevant Orders    X-Ray Chest PA And Lateral    Dehydration                Lab Results   Component Value Date    WBC 12.04 02/14/2023    RBC 3.49 (L) 02/14/2023    HGB 10.5 (L) 02/14/2023    HCT 33.2 (L) 02/14/2023    MCV 95 02/14/2023    MCH 30.1 02/14/2023    MCHC 31.6 (L) 02/14/2023    RDW 15.6 (H) 02/14/2023     02/14/2023    MPV 11.2 02/14/2023    GRAN 8.0 (H) 02/14/2023    GRAN 66.8 02/14/2023    LYMPH 3.1 02/14/2023    LYMPH 25.4 02/14/2023    MONO 0.8 02/14/2023    MONO 6.5 02/14/2023    EOS 0.1 02/14/2023    BASO 0.02 02/14/2023    EOSINOPHIL 0.9 02/14/2023    BASOPHIL 0.2 02/14/2023      Lab Results   Component Value Date     02/14/2023    K 4.3 02/14/2023     02/14/2023    CO2 26 02/14/2023    BUN 32 (H) 02/14/2023    CREATININE 1.5 (H) 02/14/2023    CALCIUM 9.0 02/14/2023    ANIONGAP 12 02/14/2023     Lab Results   Component Value Date    ALT 11 02/14/2023    AST 18 02/14/2023    ALKPHOS 79 02/14/2023    BILITOT 0.2 02/14/2023     Lab Results   Component Value Date    IRON 85 11/01/2022    TRANSFERRIN 194 (L) 11/01/2022    TIBC 287 11/01/2022    FESATURATED 30 11/01/2022      Lab Results   Component Value Date    FERRITIN 867 (H) 11/01/2022     Lab Results   Component Value Date    BMOOPUEY65 771 11/03/2022     Lab Results   Component Value Date    FOLATE 16.7 11/03/2022     Lab Results   Component Value Date    TSH 2.207 10/24/2022       Plan:   Hepatic cirrhosis, unspecified hepatic cirrhosis type  -     Ambulatory referral/consult to Hepatology; Future; Expected date: 02/23/2023  -     Comprehensive Metabolic Panel; Future; Expected date: 02/16/2023  -     Comprehensive Metabolic Panel; Future; Expected date: 02/16/2023    Stage 3a chronic kidney disease  -     CBC Auto Differential; Future; Expected date: 02/16/2023  -      Comprehensive Metabolic Panel; Future; Expected date: 02/16/2023  -     Comprehensive Metabolic Panel; Future; Expected date: 02/16/2023    Granulocytosis  -     Urinalysis, Reflex to Urine Culture Urine, Clean Catch; Future; Expected date: 02/16/2023  -     CBC Auto Differential; Future; Expected date: 02/16/2023  -     CBC Auto Differential; Future; Expected date: 02/16/2023    Function kidney decreased  -     Urinalysis, Reflex to Urine Culture Urine, Clean Catch; Future; Expected date: 02/16/2023  -     Comprehensive Metabolic Panel; Future; Expected date: 02/16/2023  -     Comprehensive Metabolic Panel; Future; Expected date: 02/16/2023    Chronic cough  -     X-Ray Chest PA And Lateral; Future; Expected date: 02/16/2023    Dehydration      Med Onc Chart Routing      Follow up with physician    Follow up with KEITH 1 month. f/u in 1 month with labs prior (like labs and appointments after 1pm)- cbc and cmp in [place] f/u in [place]; refer to hepatology   Infusion scheduling note urinalysis with reflex culture   Injection scheduling note n/a   Labs   Lab interval:  Labs in 2 weeks - cbc and cmp in [place]   Imaging   Chest xray (not today)- schedule in [place] after lunch   Pharmacy appointment No pharmacy appointment needed      Other referrals Additional referrals needed      Increase fluids and hold 1 dose Bumex.  Recheck kidney function in 1 week.  Ua with reflex culture today.  Chest xray tomorrow in [place].  R/u in 1 months with labs prior on [place].     Collaborating Provider:  Dr. Jose Beltran    Thank You,  Sundar Guzman, FNP-C  Hematology Oncology

## 2023-02-17 ENCOUNTER — TELEPHONE (OUTPATIENT)
Dept: HEMATOLOGY/ONCOLOGY | Facility: CLINIC | Age: 68
End: 2023-02-17
Payer: MEDICARE

## 2023-02-17 DIAGNOSIS — R30.0 DYSURIA: Primary | ICD-10-CM

## 2023-02-17 NOTE — TELEPHONE ENCOUNTER
----- Message from Rajwinder Kendrick sent at 2/17/2023  2:50 PM CST -----  Contact: self [phone]  Patient called in requesting a call back concerning UA . Please call back [phone].

## 2023-02-17 NOTE — TELEPHONE ENCOUNTER
Nurse spoke with pt in regards to scheduling her urine lab. Pt has been scheduled. Verbalized understanding.

## 2023-02-21 ENCOUNTER — PATIENT MESSAGE (OUTPATIENT)
Dept: ADMINISTRATIVE | Facility: HOSPITAL | Age: 68
End: 2023-02-21
Payer: MEDICARE

## 2023-02-22 ENCOUNTER — HOSPITAL ENCOUNTER (OUTPATIENT)
Dept: RADIOLOGY | Facility: HOSPITAL | Age: 68
Discharge: HOME | End: 2023-02-22
Attending: NURSE PRACTITIONER
Payer: MEDICARE

## 2023-02-22 DIAGNOSIS — R05.3 CHRONIC COUGH: ICD-10-CM

## 2023-02-22 PROCEDURE — 71046 XR CHEST PA AND LATERAL: ICD-10-PCS | Mod: 26,,, | Performed by: RADIOLOGY

## 2023-02-22 PROCEDURE — 71046 X-RAY EXAM CHEST 2 VIEWS: CPT | Mod: 26,,, | Performed by: RADIOLOGY

## 2023-02-22 PROCEDURE — 71046 X-RAY EXAM CHEST 2 VIEWS: CPT | Mod: TC,PN

## 2023-02-23 ENCOUNTER — HOSPITAL ENCOUNTER (OUTPATIENT)
Dept: RADIOLOGY | Facility: HOSPITAL | Age: 68
Discharge: HOME | End: 2023-02-23
Attending: FAMILY MEDICINE
Payer: MEDICARE

## 2023-02-23 ENCOUNTER — PATIENT MESSAGE (OUTPATIENT)
Dept: HEMATOLOGY/ONCOLOGY | Facility: CLINIC | Age: 68
End: 2023-02-23
Payer: MEDICARE

## 2023-02-23 DIAGNOSIS — Z12.31 BREAST CANCER SCREENING BY MAMMOGRAM: ICD-10-CM

## 2023-02-23 PROCEDURE — 77063 MAMMO DIGITAL SCREENING BILAT WITH TOMO: ICD-10-PCS | Mod: 26,,, | Performed by: RADIOLOGY

## 2023-02-23 PROCEDURE — 77067 SCR MAMMO BI INCL CAD: CPT | Mod: 26,,, | Performed by: RADIOLOGY

## 2023-02-23 PROCEDURE — 77067 MAMMO DIGITAL SCREENING BILAT WITH TOMO: ICD-10-PCS | Mod: 26,,, | Performed by: RADIOLOGY

## 2023-02-23 PROCEDURE — 77063 BREAST TOMOSYNTHESIS BI: CPT | Mod: 26,,, | Performed by: RADIOLOGY

## 2023-02-23 PROCEDURE — 77067 SCR MAMMO BI INCL CAD: CPT | Mod: TC,PN

## 2023-02-24 ENCOUNTER — TELEPHONE (OUTPATIENT)
Dept: HEPATOLOGY | Facility: CLINIC | Age: 68
End: 2023-02-24
Payer: MEDICARE

## 2023-02-24 NOTE — TELEPHONE ENCOUNTER
Spoke with patient on [phone] and tried to assist in getting a afternoon appointment. Patient has been advised that changing the appointment scheduled will place her in June on the 28th. Patient states she will keep the appointment she has on file for April 26th at 10 am.    ----- Message from Alva Brown sent at 2/24/2023  1:22 PM CST -----  Contact: [phone]  Patient would like to consult with a nurse in regards to her scheduled appt. Please call back at [phone]. Thanks cally

## 2023-03-01 ENCOUNTER — PES CALL (OUTPATIENT)
Dept: ADMINISTRATIVE | Facility: CLINIC | Age: 68
End: 2023-03-01
Payer: MEDICARE

## 2023-03-01 ENCOUNTER — HOSPITAL ENCOUNTER (OUTPATIENT)
Dept: RADIOLOGY | Facility: HOSPITAL | Age: 68
Discharge: HOME | End: 2023-03-01
Attending: FAMILY MEDICINE
Payer: MEDICARE

## 2023-03-01 ENCOUNTER — OFFICE VISIT (OUTPATIENT)
Dept: PRIMARY CARE CLINIC | Facility: CLINIC | Age: 68
End: 2023-03-01
Payer: MEDICARE

## 2023-03-01 VITALS
HEIGHT: 62 IN | SYSTOLIC BLOOD PRESSURE: 108 MMHG | WEIGHT: 106.69 LBS | BODY MASS INDEX: 19.63 KG/M2 | DIASTOLIC BLOOD PRESSURE: 76 MMHG | HEART RATE: 73 BPM | TEMPERATURE: 98 F

## 2023-03-01 DIAGNOSIS — E78.5 HYPERLIPIDEMIA, UNSPECIFIED HYPERLIPIDEMIA TYPE: ICD-10-CM
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
## 2023-03-01 DIAGNOSIS — M25.532 LEFT WRIST PAIN: ICD-10-CM

## 2023-03-01 DIAGNOSIS — I50.32 CHRONIC DIASTOLIC CONGESTIVE HEART FAILURE: ICD-10-CM

## 2023-03-01 DIAGNOSIS — G47.9 SLEEP DIFFICULTIES: ICD-10-CM

## 2023-03-01 DIAGNOSIS — M25.532 LEFT WRIST PAIN: Primary | ICD-10-CM

## 2023-03-01 DIAGNOSIS — I10 PRIMARY HYPERTENSION: ICD-10-CM

## 2023-03-01 DIAGNOSIS — K21.9 GASTROESOPHAGEAL REFLUX DISEASE WITHOUT ESOPHAGITIS: ICD-10-CM

## 2023-03-01 PROCEDURE — 1126F AMNT PAIN NOTED NONE PRSNT: CPT | Mod: CPTII,S$GLB,, | Performed by: FAMILY MEDICINE

## 2023-03-01 PROCEDURE — 3078F PR MOST RECENT DIASTOLIC BLOOD PRESSURE < 80 MM HG: ICD-10-PCS | Mod: CPTII,S$GLB,, | Performed by: FAMILY MEDICINE

## 2023-03-01 PROCEDURE — 73110 X-RAY EXAM OF WRIST: CPT | Mod: TC,PN,LT

## 2023-03-01 PROCEDURE — 3008F BODY MASS INDEX DOCD: CPT | Mod: CPTII,S$GLB,, | Performed by: FAMILY MEDICINE

## 2023-03-01 PROCEDURE — 99215 PR OFFICE/OUTPT VISIT, EST, LEVL V, 40-54 MIN: ICD-10-PCS | Mod: S$GLB,,, | Performed by: FAMILY MEDICINE

## 2023-03-01 PROCEDURE — 99999 PR PBB SHADOW E&M-EST. PATIENT-LVL V: ICD-10-PCS | Mod: PBBFAC,,, | Performed by: FAMILY MEDICINE

## 2023-03-01 PROCEDURE — 3288F PR FALLS RISK ASSESSMENT DOCUMENTED: ICD-10-PCS | Mod: CPTII,S$GLB,, | Performed by: FAMILY MEDICINE

## 2023-03-01 PROCEDURE — 3078F DIAST BP <80 MM HG: CPT | Mod: CPTII,S$GLB,, | Performed by: FAMILY MEDICINE

## 2023-03-01 PROCEDURE — 73110 X-RAY EXAM OF WRIST: CPT | Mod: 26,LT,, | Performed by: RADIOLOGY

## 2023-03-01 PROCEDURE — 3288F FALL RISK ASSESSMENT DOCD: CPT | Mod: CPTII,S$GLB,, | Performed by: FAMILY MEDICINE

## 2023-03-01 PROCEDURE — 4010F ACE/ARB THERAPY RXD/TAKEN: CPT | Mod: CPTII,S$GLB,, | Performed by: FAMILY MEDICINE

## 2023-03-01 PROCEDURE — 1160F PR REVIEW ALL MEDS BY PRESCRIBER/CLIN PHARMACIST DOCUMENTED: ICD-10-PCS | Mod: CPTII,S$GLB,, | Performed by: FAMILY MEDICINE

## 2023-03-01 PROCEDURE — 99215 OFFICE O/P EST HI 40 MIN: CPT | Mod: S$GLB,,, | Performed by: FAMILY MEDICINE

## 2023-03-01 PROCEDURE — 1160F RVW MEDS BY RX/DR IN RCRD: CPT | Mod: CPTII,S$GLB,, | Performed by: FAMILY MEDICINE

## 2023-03-01 PROCEDURE — 1100F PR PT FALLS ASSESS DOC 2+ FALLS/FALL W/INJURY/YR: ICD-10-PCS | Mod: CPTII,S$GLB,, | Performed by: FAMILY MEDICINE

## 2023-03-01 PROCEDURE — 3074F PR MOST RECENT SYSTOLIC BLOOD PRESSURE < 130 MM HG: ICD-10-PCS | Mod: CPTII,S$GLB,, | Performed by: FAMILY MEDICINE

## 2023-03-01 PROCEDURE — 1100F PTFALLS ASSESS-DOCD GE2>/YR: CPT | Mod: CPTII,S$GLB,, | Performed by: FAMILY MEDICINE

## 2023-03-01 PROCEDURE — 73110 XR WRIST COMPLETE 3 VIEWS LEFT: ICD-10-PCS | Mod: 26,LT,, | Performed by: RADIOLOGY

## 2023-03-01 PROCEDURE — 1159F PR MEDICATION LIST DOCUMENTED IN MEDICAL RECORD: ICD-10-PCS | Mod: CPTII,S$GLB,, | Performed by: FAMILY MEDICINE

## 2023-03-01 PROCEDURE — 1159F MED LIST DOCD IN RCRD: CPT | Mod: CPTII,S$GLB,, | Performed by: FAMILY MEDICINE

## 2023-03-01 PROCEDURE — 4010F PR ACE/ARB THEARPY RXD/TAKEN: ICD-10-PCS | Mod: CPTII,S$GLB,, | Performed by: FAMILY MEDICINE

## 2023-03-01 PROCEDURE — 1126F PR PAIN SEVERITY QUANTIFIED, NO PAIN PRESENT: ICD-10-PCS | Mod: CPTII,S$GLB,, | Performed by: FAMILY MEDICINE

## 2023-03-01 PROCEDURE — 3074F SYST BP LT 130 MM HG: CPT | Mod: CPTII,S$GLB,, | Performed by: FAMILY MEDICINE

## 2023-03-01 PROCEDURE — 99999 PR PBB SHADOW E&M-EST. PATIENT-LVL V: CPT | Mod: PBBFAC,,, | Performed by: FAMILY MEDICINE

## 2023-03-01 PROCEDURE — 3008F PR BODY MASS INDEX (BMI) DOCUMENTED: ICD-10-PCS | Mod: CPTII,S$GLB,, | Performed by: FAMILY MEDICINE

## 2023-03-01 RX ORDER — ALLOPURINOL 100 MG/1
100 TABLET ORAL DAILY
Qty: 90 TABLET | Refills: 3 | Status: SHIPPED | OUTPATIENT
Start: 2023-03-01 | End: 2023-06-05 | Stop reason: SDUPTHER

## 2023-03-01 RX ORDER — PANTOPRAZOLE SODIUM 40 MG/1
40 TABLET, DELAYED RELEASE ORAL DAILY
Qty: 90 TABLET | Refills: 3 | Status: SHIPPED | OUTPATIENT
Start: 2023-03-01 | End: 2023-06-05 | Stop reason: SDUPTHER

## 2023-03-01 RX ORDER — MIRTAZAPINE 15 MG/1
15 TABLET, FILM COATED ORAL NIGHTLY PRN
Qty: 90 TABLET | Refills: 3 | Status: SHIPPED | OUTPATIENT
Start: 2023-03-01 | End: 2023-06-05 | Stop reason: SDUPTHER

## 2023-03-01 RX ORDER — ROSUVASTATIN CALCIUM 10 MG/1
10 TABLET, COATED ORAL NIGHTLY
Qty: 90 TABLET | Refills: 3 | Status: SHIPPED | OUTPATIENT
Start: 2023-03-01 | End: 2023-06-05 | Stop reason: SDUPTHER

## 2023-03-01 RX ORDER — BUMETANIDE 1 MG/1
0.5 TABLET ORAL EVERY OTHER DAY
Qty: 45 TABLET | Refills: 3 | Status: SHIPPED | OUTPATIENT
Start: 2023-03-01 | End: 2023-06-05 | Stop reason: SDUPTHER

## 2023-03-01 RX ORDER — LOSARTAN POTASSIUM 25 MG/1
25 TABLET ORAL DAILY
Qty: 90 TABLET | Refills: 3 | Status: SHIPPED | OUTPATIENT
Start: 2023-03-01 | End: 2023-06-05 | Stop reason: SDUPTHER

## 2023-03-01 RX ORDER — METOPROLOL SUCCINATE 25 MG/1
25 TABLET, EXTENDED RELEASE ORAL DAILY
Qty: 90 TABLET | Refills: 3 | Status: SHIPPED | OUTPATIENT
Start: 2023-03-01 | End: 2023-06-05 | Stop reason: SDUPTHER

## 2023-03-01 NOTE — PROGRESS NOTES
Ms. Jasmina,    Attached is the result of your recent wrist x-ray.      There is some arthritis and some mild soft tissue swelling, but the good news is that there are no fractures, nor dislocations.  Looks like you just sprained it.      Continue to use Tylenol, as needed for pain.  Continue to apply ice/heat during the day.  Keep using your splint or Ace wrap.      If you get no relief, please follow-up with my sports medicine specialist, Dr. Zendejas.  I placed a referral to his office today.

## 2023-03-01 NOTE — PATIENT INSTRUCTIONS
Physically, everything looks pretty good.      Let us get an x-ray of the wrist to make sure you did not break anything.  We will contact you with the results as soon as they are available.      Continue to put ice pack or cold compress on it 2 or 3 times per day, 10-15 minutes at a time.  Alternate the cold compress with warm, moist, heat.    Use OTC Tylenol, as needed, for pain.      If it is still hurting next week, let us have you see our sports medicine specialist, Dr. Zendejas, for recheck.  Referral placed today.  He comes here every Monday.    I will send refills of your medications to the pharmacy this afternoon.  Please continue taking them, as directed.      Handicap form filled out today.  Just bring this to the [employer] and they should give you a permanent handicap tag.    Keep your follow-up appointments with specialists, as scheduled.      Continue to eat a healthy diet.  Be careful with portion sizes.  Includes lots of fresh fruits, vegetables, whole grains, lean proteins.  See info below.    Keep hydrated.  Be sure to drink at least 8-10, 8 oz, glasses of water every day.    Stay active.  Try to do some sort of physical activity every day.  Nothing outrageous, just try walking for 10-15 minutes each day.

## 2023-03-01 NOTE — PROGRESS NOTES
"    [facility]       [address] [place] [address]      Phone: [phone]      Fax: [phone]    Brock Ortiz MD                Office Visit  2023        Subjective      HPI:  Jasmina Sharma is a 67 y.o. female presents today in clinic for "Follow-up  ."     67-year-old female presents today to checkup on multiple issues.      Patient states she is doing okay today.  Feels like she has gotten some more pep in her step.    She is requesting a new handicap sticker for her car.  Her previous one .  Would like to get a permanent sticker instead.    States that last Friday, five days ago, she slipped and fell.  She was getting up from her chair, stepped on a piece of paper, slipped.  Landed on her left wrist.  Started hurting, but did not swell.  Has some bruising.  Immediately, started applying ice to the area.  She wrapped it with an Ace wrap, then was able to get a splint.  Has been alternating ice and heat since then.  Still has some tenderness in when moving the wrist.    She states she feels like she bruises more frequently.  Any little bump causes her skin to change color.  Elbows stay bruised all the time.  She is very active, likes to keep moving.  Sometimes gets a little clumsy and bumps into walls, doors.  Sees Hematology.  They have her on folic acid, B12 supplements.  Also have her taking prenatal vitamins, avoiding alcohol.  They are monitoring her blood counts, which seemed to be improving.    During some of her routine blood work, it was noticed her renal function has decreased.  They had her stop NSAIDs (Celebrex).  She still takes the baby aspirin.  She has an appointment with Nephrology, but not until .  Does not want to go to [place] to see someone.  Wants to stay out here.    As part of her workup in Hematology, she had an ultrasound of the abdomen done.  It showed a nodular liver, so she was given follow-up " appointment with GI.  Will see Dr. Hawkins on April 26.    She has the Cologuard box at home, but has not done the test.  Was not quite sure how to do it.    Has also seen cardiology since last visit.  They instructed her to continue her statin, Toprol, Bumex.  They decreased her losartan to 1/2 tablet daily.    Has hypertension.  Currently takes losartan 25 mg daily.  No issues with this medication.      Has CHF.  Uses Bumex every other day for leg swelling.  Takes Toprol-XL 25 mg daily.    Has COPD.  Uses Trelegy inhaler daily.  Also uses albuterol inhaler, as needed.  Has nebulizer at home to do DuoNebs.  Supplements with Claritin daily.  Follows regularly with her pulmonologist, Dr. Sun.  States that she sees him every month.  Last time she saw him, she had been sick for so long, he tried putting on extended course of prednisone.  She took it for 30 days.  Felt great.  Has been off of it for a few days now.  Feels some of the congestion coming back.  He also writes her Klonopin.    Has chronic anxiety.  Takes 1/2 tablet of Klonopin, twice a day.  Was taking Celexa 40 mg daily, but this was discontinued due to her declining kidney function.    Has elevated cholesterol levels.  Takes Crestor 10 mg daily for this.    Occasionally has sleep issues.  Uses Remeron 15 mg, nightly, to help with sleep.    Occasional episodes of GERD.  Uses Protonix 40 mg daily.    Occasionally has issues with restless legs.  Uses Mirapex, as needed.    Has chronic neuropathy.  Follows with Pain Management, Dr. BRIAN Dominguez.  He has her on Lyrica.  Also prescribes her Norco, Celebrex.    Had issues with alcohol abuse in the past.  Takes folic acid 1 mg every morning.    PMH: HTN, HLD, CAD, CHF, COPD, chronic neuropathy.    PSH: Multiple cosmetic procedures.    F MH: CAD, DM, lung cancer, liver cancer   Allergies:  Amoxicillin causes itching, rash.  Scopolamine causes itching, rash.  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  Lives alone.     T: Denies current use.  Quit in , when went into the hospital.  Previously, two packs per day x 40+ years  A:  Previously daily.  Quit drinking.    D:  Denies     Exercise:  No regular exercise program.  Occasionally gardens.  Gets short of breath with activity.      Pain management:  Dr. BRIAN Dominguez   Pulmonology:  Dr. Sun   Cardiology:  Dr. Taylor     MMG:  States she is due   Colon: None      The following were updated and reviewed by myself in the chart: medications, past medical history, past surgical history, family history, social history, and allergies.     Medications:  Current Outpatient Medications on File Prior to Visit   Medication Sig Dispense Refill    albuterol-ipratropium (DUO-NEB) 2.5 mg-0.5 mg/3 mL nebulizer solution Take by nebulization every 6 (six) hours as needed.      clonazePAM (KLONOPIN) 0.5 MG tablet Take 0.25 mg by mouth nightly as needed.      folic acid (FOLVITE) 1 MG tablet Take 1 tablet (1,000 mcg total) by mouth every morning. 90 tablet 3    glucosamine-chondroitin 500-400 mg tablet Take 1 tablet by mouth 3 (three) times daily.      HYDROcodone-acetaminophen (NORCO) 7.5-325 mg per tablet SMARTSI Tablet(s) By Mouth Every 12 Hours PRN      loratadine (CLARITIN) 10 mg tablet Take 1 tablet (10 mg total) by mouth once daily. 90 tablet 3    naloxone (NARCAN) 4 mg/actuation Spry naloxone 4 mg/actuation nasal spray   SPRAY 1 SPRAY INTO ONE NOSTRIL REPEAT ANOTHER INTO OPPOSITE NOSTRIL IN 2 TO 3 MINUTES UNTIL PT RESPONSIVE OR HELP ARRIVES      pramipexole (MIRAPEX) 0.75 MG tablet pramipexole 0.75 mg tablet   TAKE 1 TABLET BY MOUTH AT BEDTIME FOR RESTLESS LEGS      pregabalin (LYRICA) 50 MG capsule Take 50 mg by mouth 3 (three) times daily as needed.      TRELEGY ELLIPTA 100-62.5-25 mcg DsDv SMARTSI Inhalation Via Inhaler Daily      [DISCONTINUED] allopurinoL (ZYLOPRIM) 300 MG tablet Take 1 tablet (300 mg total) by mouth once daily. 90 tablet 3    [DISCONTINUED] bumetanide  (BUMEX) 1 MG tablet Take 0.5 tablets (0.5 mg total) by mouth every other day. 45 tablet 3    [DISCONTINUED] celecoxib (CELEBREX) 200 MG capsule Take 1 capsule (200 mg total) by mouth 2 (two) times daily. 180 capsule 3    [DISCONTINUED] citalopram (CELEXA) 40 MG tablet Take 1 tablet (40 mg total) by mouth once daily. (Patient taking differently: Take by mouth once daily.) 90 tablet 3    [DISCONTINUED] gabapentin (NEURONTIN) 400 MG capsule gabapentin 400 mg capsule   TAKE ONE CAPSULE THREE TIMES A DAY (IN THE MORNING, AT NOON, AND AT BEDTIME)      [DISCONTINUED] losartan (COZAAR) 25 MG tablet Take 1 tablet (25 mg total) by mouth once daily. 90 tablet 3    [DISCONTINUED] metoprolol succinate (TOPROL-XL) 25 MG 24 hr tablet Take 1 tablet (25 mg total) by mouth once daily. 90 tablet 3    [DISCONTINUED] mirtazapine (REMERON) 15 MG tablet Take 1 tablet (15 mg total) by mouth nightly as needed (sleep). 90 tablet 3    [DISCONTINUED] pantoprazole (PROTONIX) 40 MG tablet Take 1 tablet (40 mg total) by mouth once daily. 90 tablet 3    [DISCONTINUED] rosuvastatin (CRESTOR) 10 MG tablet Take 1 tablet (10 mg total) by mouth every evening. 90 tablet 3    [DISCONTINUED] sulfamethoxazole-trimethoprim 800-160mg (BACTRIM DS) 800-160 mg Tab Take 1 tablet by mouth 2 (two) times daily.      albuterol (PROVENTIL/VENTOLIN HFA) 90 mcg/actuation inhaler Inhale 2 puffs into the lungs every 6 (six) hours as needed.      [DISCONTINUED] predniSONE (DELTASONE) 5 MG tablet Take 5 mg by mouth.       No current facility-administered medications on file prior to visit.        PMHx:  Past Medical History:   Diagnosis Date    COPD (chronic obstructive pulmonary disease)     Encounter for blood transfusion     Hyperlipidemia     Hypertension       Patient Active Problem List    Diagnosis Date Noted    Hepatic cirrhosis, unspecified hepatic cirrhosis type 02/16/2023    Stage 3a chronic kidney disease 02/16/2023    CAD (coronary artery disease) 10/27/2022  "   Acute on chronic diastolic heart failure 04/03/2022    Hyperlipidemia 01/28/2019    Severe chronic obstructive pulmonary disease 08/27/2018    Essential hypertension 05/02/2016        PSHx:  Past Surgical History:   Procedure Laterality Date    AUGMENTATION OF BREAST Bilateral     3 times    BREAST SURGERY          FHx:  Family History   Problem Relation Age of Onset    Hypertension Mother     Memory loss Mother     Hypertension Father     Diabetes Father     Cancer Father         Social:  Social History     Socioeconomic History    Marital status:    Tobacco Use    Smoking status: Every Day     Types: Cigarettes, Vaping w/o nicotine     Passive exposure: Past    Smokeless tobacco: Never   Substance and Sexual Activity    Alcohol use: Not Currently    Drug use: Never    Sexual activity: Not Currently        Allergies:  Review of patient's allergies indicates:   Allergen Reactions    Amoxicillin Hives, Itching and Rash    Scopolamine Itching        ROS:  Review of Systems   Constitutional:  Negative for activity change, appetite change, chills and fever.   HENT:  Negative for congestion, postnasal drip, rhinorrhea, sore throat and trouble swallowing.    Respiratory:  Negative for cough, shortness of breath and wheezing.    Cardiovascular:  Negative for chest pain and palpitations.   Gastrointestinal:  Negative for abdominal pain, constipation, diarrhea, nausea and vomiting.   Genitourinary:  Negative for difficulty urinating.   Musculoskeletal:  Positive for arthralgias and myalgias.   Skin:  Positive for color change. Negative for rash.   Neurological:  Negative for speech difficulty and headaches.   All other systems reviewed and are negative.       Objective      /76   Pulse 73   Temp 98.2 °F (36.8 °C)   Ht 5' 2" (1.575 m)   Wt 48.4 kg (106 lb 11.2 oz)   BMI 19.52 kg/m²   Ht Readings from Last 3 Encounters:   03/01/23 5' 2" (1.575 m)   02/16/23 5' 2" (1.575 m)   11/03/22 5' 2" (1.575 m) "     Wt Readings from Last 3 Encounters:   03/01/23 48.4 kg (106 lb 11.2 oz)   02/16/23 45.9 kg (101 lb 3.1 oz)   11/03/22 50.3 kg (110 lb 14.3 oz)       PHYSICAL EXAM:  Physical Exam  Vitals and nursing note reviewed.   Constitutional:       General: She is not in acute distress.     Appearance: Normal appearance.   HENT:      Head: Normocephalic and atraumatic.      Right Ear: Tympanic membrane, ear canal and external ear normal.      Left Ear: Tympanic membrane, ear canal and external ear normal.      Nose: Nose normal. No congestion or rhinorrhea.      Mouth/Throat:      Mouth: Mucous membranes are moist.      Pharynx: Oropharynx is clear. No oropharyngeal exudate or posterior oropharyngeal erythema.   Eyes:      Extraocular Movements: Extraocular movements intact.      Conjunctiva/sclera: Conjunctivae normal.      Pupils: Pupils are equal, round, and reactive to light.   Cardiovascular:      Rate and Rhythm: Normal rate and regular rhythm.   Pulmonary:      Effort: Pulmonary effort is normal. No respiratory distress.      Breath sounds: No wheezing, rhonchi or rales.   Musculoskeletal:         General: Normal range of motion.      Left wrist: Tenderness present. Normal range of motion.      Cervical back: Normal range of motion.      Comments: Left wrist tender to palpation dorsal surface, between radius and ulna.  Full range of motion.  Only some mild tenderness when she palmar flexes the hand/wrist.  No breaks in skin.  Mild ecchymosis.   Lymphadenopathy:      Cervical: No cervical adenopathy.   Skin:     General: Skin is warm and dry.      Findings: No rash.   Neurological:      Mental Status: She is alert.            LABS / IMAGING:  Recent Results (from the past 4368 hour(s))   HEMOGLOBIN AND HEMATOCRIT, BLOOD    Collection Time: 09/10/22  1:48 PM   Result Value Ref Range    Hemoglobin 8.5 (L) 12.0 - 16.0 g/dL    Hematocrit 27.0 (L) 37.0 - 47.0 %   HEMOGLOBIN AND HEMATOCRIT, BLOOD    Collection Time:  09/11/22  1:07 PM   Result Value Ref Range    Hemoglobin 8.4 (L) 12.0 - 16.0 g/dL    Hematocrit 26.9 (L) 37.0 - 47.0 %   Urinalysis    Collection Time: 10/24/22 12:17 PM   Result Value Ref Range    Specimen UA Urine, Clean Catch     Color, UA Yellow Yellow, Straw, Shalini    Appearance, UA Clear Clear    pH, UA 6.0 5.0 - 8.0    Specific Gravity, UA 1.010 1.005 - 1.030    Protein, UA Negative Negative    Glucose, UA Negative Negative    Ketones, UA Negative Negative    Bilirubin (UA) Negative Negative    Occult Blood UA Negative Negative    Nitrite, UA Negative Negative    Leukocytes, UA Negative Negative   CULTURE, URINE    Collection Time: 10/24/22 12:17 PM    Specimen: Urine, Clean Catch   Result Value Ref Range    Urine Culture, Routine No significant growth    CBC Auto Differential    Collection Time: 10/24/22  3:12 PM   Result Value Ref Range    WBC 8.05 3.90 - 12.70 K/uL    RBC 2.54 (L) 4.00 - 5.40 M/uL    Hemoglobin 7.8 (L) 12.0 - 16.0 g/dL    Hematocrit 25.7 (L) 37.0 - 48.5 %     (H) 82 - 98 fL    MCH 30.7 27.0 - 31.0 pg    MCHC 30.4 (L) 32.0 - 36.0 g/dL    RDW 17.6 (H) 11.5 - 14.5 %    Platelets 403 150 - 450 K/uL    MPV 10.0 9.2 - 12.9 fL    Immature Granulocytes 1.1 (H) 0.0 - 0.5 %    Gran # (ANC) 5.1 1.8 - 7.7 K/uL    Immature Grans (Abs) 0.09 (H) 0.00 - 0.04 K/uL    Lymph # 2.0 1.0 - 4.8 K/uL    Mono # 0.7 0.3 - 1.0 K/uL    Eos # 0.2 0.0 - 0.5 K/uL    Baso # 0.03 0.00 - 0.20 K/uL    nRBC 0 0 /100 WBC    Gran % 63.7 38.0 - 73.0 %    Lymph % 24.2 18.0 - 48.0 %    Mono % 8.2 4.0 - 15.0 %    Eosinophil % 2.4 0.0 - 8.0 %    Basophil % 0.4 0.0 - 1.9 %    Differential Method Automated    Comprehensive Metabolic Panel    Collection Time: 10/24/22  3:12 PM   Result Value Ref Range    Sodium 134 (L) 136 - 145 mmol/L    Potassium 4.4 3.5 - 5.1 mmol/L    Chloride 98 95 - 110 mmol/L    CO2 25 23 - 29 mmol/L    Glucose 83 70 - 110 mg/dL    BUN 10 8 - 23 mg/dL    Creatinine 1.2 0.5 - 1.4 mg/dL    Calcium 9.0 8.7  - 10.5 mg/dL    Total Protein 5.8 (L) 6.0 - 8.4 g/dL    Albumin 2.9 (L) 3.5 - 5.2 g/dL    Total Bilirubin 0.3 0.1 - 1.0 mg/dL    Alkaline Phosphatase 77 55 - 135 U/L    AST 16 10 - 40 U/L    ALT 9 (L) 10 - 44 U/L    Anion Gap 11 8 - 16 mmol/L    eGFR 49.9 (A) >60 mL/min/1.73 m^2   TSH    Collection Time: 10/24/22  3:12 PM   Result Value Ref Range    TSH 2.207 0.400 - 4.000 uIU/mL   Lipid Panel    Collection Time: 10/24/22  3:12 PM   Result Value Ref Range    Cholesterol 152 120 - 199 mg/dL    Triglycerides 156 (H) 30 - 150 mg/dL    HDL 45 40 - 75 mg/dL    LDL Cholesterol 75.8 63.0 - 159.0 mg/dL    HDL/Cholesterol Ratio 29.6 20.0 - 50.0 %    Total Cholesterol/HDL Ratio 3.4 2.0 - 5.0    Non-HDL Cholesterol 107 mg/dL   Hepatitis C Antibody    Collection Time: 10/24/22  3:12 PM   Result Value Ref Range    Hepatitis C Ab Non-reactive Non-reactive   B-TYPE NATRIURETIC PEPTIDE    Collection Time: 10/24/22  3:12 PM   Result Value Ref Range     (H) 0 - 99 pg/mL   Iron and TIBC    Collection Time: 11/01/22 12:50 PM   Result Value Ref Range    Iron 85 30 - 160 ug/dL    Transferrin 194 (L) 200 - 375 mg/dL    TIBC 287 250 - 450 ug/dL    Saturated Iron 30 20 - 50 %   FERRITIN    Collection Time: 11/01/22 12:50 PM   Result Value Ref Range    Ferritin 867 (H) 20.0 - 300.0 ng/mL   Pathologist Interpretation Differential    Collection Time: 11/03/22  4:31 PM   Result Value Ref Range    Pathologist Review Review completed    Vitamin B12    Collection Time: 11/03/22  4:31 PM   Result Value Ref Range    Vitamin B-12 771 210 - 950 pg/mL   Folate    Collection Time: 11/03/22  4:31 PM   Result Value Ref Range    Folate 16.7 4.0 - 24.0 ng/mL   Protein Electrophoresis, Serum    Collection Time: 11/03/22  4:31 PM   Result Value Ref Range    Protein, Serum 6.3 6.0 - 8.4 g/dL    Albumin 3.38 3.35 - 5.55 g/dL    Alpha-1 0.40 0.17 - 0.41 g/dL    Alpha-2 0.93 0.43 - 0.99 g/dL    Beta 0.82 0.50 - 1.10 g/dL    Gamma 0.78 0.67 - 1.58 g/dL    Immunoglobulin Free LT Chains Blood    Collection Time: 11/03/22  4:31 PM   Result Value Ref Range    Kappa Free Light Chains 5.08 (H) 0.33 - 1.94 mg/dL    Lambda Free Light Chains 4.05 (H) 0.57 - 2.63 mg/dL    Kappa/Lambda FLC Ratio 1.25 0.26 - 1.65   Immunofixation Electrophoresis    Collection Time: 11/03/22  4:31 PM   Result Value Ref Range    Immunofix Interp. SEE COMMENT    Lactate Dehydrogenase    Collection Time: 11/03/22  4:31 PM   Result Value Ref Range     110 - 260 U/L   Reticulocytes    Collection Time: 11/03/22  4:31 PM   Result Value Ref Range    Retic 3.7 (H) 0.5 - 2.5 %   Haptoglobin    Collection Time: 11/03/22  4:31 PM   Result Value Ref Range    Haptoglobin 176 30 - 250 mg/dL   Erythropoietin    Collection Time: 11/03/22  4:31 PM   Result Value Ref Range    Erythropoietin 8.7 2.6 - 18.5 mIU/mL   C-Reactive Protein    Collection Time: 11/03/22  4:31 PM   Result Value Ref Range    CRP 3.8 0.0 - 8.2 mg/L   CBC Auto Differential    Collection Time: 11/03/22  4:31 PM   Result Value Ref Range    WBC 8.49 3.90 - 12.70 K/uL    RBC 2.86 (L) 4.00 - 5.40 M/uL    Hemoglobin 8.8 (L) 12.0 - 16.0 g/dL    Hematocrit 27.7 (L) 37.0 - 48.5 %    MCV 97 82 - 98 fL    MCH 30.8 27.0 - 31.0 pg    MCHC 31.8 (L) 32.0 - 36.0 g/dL    RDW 16.5 (H) 11.5 - 14.5 %    Platelets 313 150 - 450 K/uL    MPV 9.6 9.2 - 12.9 fL    Immature Granulocytes 0.6 (H) 0.0 - 0.5 %    Gran # (ANC) 6.0 1.8 - 7.7 K/uL    Immature Grans (Abs) 0.05 (H) 0.00 - 0.04 K/uL    Lymph # 1.7 1.0 - 4.8 K/uL    Mono # 0.6 0.3 - 1.0 K/uL    Eos # 0.1 0.0 - 0.5 K/uL    Baso # 0.04 0.00 - 0.20 K/uL    nRBC 0 0 /100 WBC    Gran % 70.1 38.0 - 73.0 %    Lymph % 20.4 18.0 - 48.0 %    Mono % 6.9 4.0 - 15.0 %    Eosinophil % 1.5 0.0 - 8.0 %    Basophil % 0.5 0.0 - 1.9 %    Differential Method Automated    Comprehensive Metabolic Panel    Collection Time: 11/03/22  4:31 PM   Result Value Ref Range    Sodium 132 (L) 136 - 145 mmol/L    Potassium 3.9 3.5 - 5.1  mmol/L    Chloride 97 95 - 110 mmol/L    CO2 24 23 - 29 mmol/L    Glucose 81 70 - 110 mg/dL    BUN 9 8 - 23 mg/dL    Creatinine 0.9 0.5 - 1.4 mg/dL    Calcium 9.3 8.7 - 10.5 mg/dL    Total Protein 6.8 6.0 - 8.4 g/dL    Albumin 3.4 (L) 3.5 - 5.2 g/dL    Total Bilirubin 0.4 0.1 - 1.0 mg/dL    Alkaline Phosphatase 77 55 - 135 U/L    AST 18 10 - 40 U/L    ALT 8 (L) 10 - 44 U/L    Anion Gap 11 8 - 16 mmol/L    eGFR >60 >60 mL/min/1.73 m^2   Pathologist Interpretation AD    Collection Time: 11/03/22  4:31 PM   Result Value Ref Range    Pathologist Interpretation AD REVIEWED    Pathologist Interpretation SPE    Collection Time: 11/03/22  4:31 PM   Result Value Ref Range    Pathologist Interpretation SPE REVIEWED    Pathologist Review    Collection Time: 11/03/22  4:31 PM   Result Value Ref Range    Pathologist Review Peripheral Smear REVISED REPORT    CBC Auto Differential    Collection Time: 11/16/22  3:20 PM   Result Value Ref Range    WBC 6.98 3.90 - 12.70 K/uL    RBC 3.04 (L) 4.00 - 5.40 M/uL    Hemoglobin 9.3 (L) 12.0 - 16.0 g/dL    Hematocrit 29.8 (L) 37.0 - 48.5 %    MCV 98 82 - 98 fL    MCH 30.6 27.0 - 31.0 pg    MCHC 31.2 (L) 32.0 - 36.0 g/dL    RDW 15.0 (H) 11.5 - 14.5 %    Platelets 208 150 - 450 K/uL    MPV 10.3 9.2 - 12.9 fL    Immature Granulocytes 0.4 0.0 - 0.5 %    Gran # (ANC) 4.4 1.8 - 7.7 K/uL    Immature Grans (Abs) 0.03 0.00 - 0.04 K/uL    Lymph # 1.9 1.0 - 4.8 K/uL    Mono # 0.5 0.3 - 1.0 K/uL    Eos # 0.1 0.0 - 0.5 K/uL    Baso # 0.02 0.00 - 0.20 K/uL    nRBC 0 0 /100 WBC    Gran % 63.3 38.0 - 73.0 %    Lymph % 27.1 18.0 - 48.0 %    Mono % 7.0 4.0 - 15.0 %    Eosinophil % 1.9 0.0 - 8.0 %    Basophil % 0.3 0.0 - 1.9 %    Differential Method Automated    Comprehensive Metabolic Panel    Collection Time: 11/16/22  3:20 PM   Result Value Ref Range    Sodium 132 (L) 136 - 145 mmol/L    Potassium 4.4 3.5 - 5.1 mmol/L    Chloride 95 95 - 110 mmol/L    CO2 29 23 - 29 mmol/L    Glucose 94 70 - 110 mg/dL     BUN 9 8 - 23 mg/dL    Creatinine 0.9 0.5 - 1.4 mg/dL    Calcium 10.2 8.7 - 10.5 mg/dL    Total Protein 6.8 6.0 - 8.4 g/dL    Albumin 3.6 3.5 - 5.2 g/dL    Total Bilirubin 0.4 0.1 - 1.0 mg/dL    Alkaline Phosphatase 80 55 - 135 U/L    AST 20 10 - 40 U/L    ALT 11 10 - 44 U/L    Anion Gap 8 8 - 16 mmol/L    eGFR >60 >60 mL/min/1.73 m^2   CBC Auto Differential    Collection Time: 12/01/22 12:20 PM   Result Value Ref Range    WBC 8.35 3.90 - 12.70 K/uL    RBC 3.41 (L) 4.00 - 5.40 M/uL    Hemoglobin 10.7 (L) 12.0 - 16.0 g/dL    Hematocrit 33.3 (L) 37.0 - 48.5 %    MCV 98 82 - 98 fL    MCH 31.4 (H) 27.0 - 31.0 pg    MCHC 32.1 32.0 - 36.0 g/dL    RDW 14.1 11.5 - 14.5 %    Platelets 348 150 - 450 K/uL    MPV 10.3 9.2 - 12.9 fL    Immature Granulocytes 0.4 0.0 - 0.5 %    Gran # (ANC) 5.5 1.8 - 7.7 K/uL    Immature Grans (Abs) 0.03 0.00 - 0.04 K/uL    Lymph # 2.0 1.0 - 4.8 K/uL    Mono # 0.6 0.3 - 1.0 K/uL    Eos # 0.1 0.0 - 0.5 K/uL    Baso # 0.03 0.00 - 0.20 K/uL    nRBC 0 0 /100 WBC    Gran % 65.9 38.0 - 73.0 %    Lymph % 24.3 18.0 - 48.0 %    Mono % 7.3 4.0 - 15.0 %    Eosinophil % 1.7 0.0 - 8.0 %    Basophil % 0.4 0.0 - 1.9 %    Differential Method Automated    Comprehensive Metabolic Panel    Collection Time: 12/01/22 12:20 PM   Result Value Ref Range    Sodium 136 136 - 145 mmol/L    Potassium 3.9 3.5 - 5.1 mmol/L    Chloride 94 (L) 95 - 110 mmol/L    CO2 27 23 - 29 mmol/L    Glucose 78 70 - 110 mg/dL    BUN 13 8 - 23 mg/dL    Creatinine 1.5 (H) 0.5 - 1.4 mg/dL    Calcium 9.6 8.7 - 10.5 mg/dL    Total Protein 7.5 6.0 - 8.4 g/dL    Albumin 3.9 3.5 - 5.2 g/dL    Total Bilirubin 0.3 0.1 - 1.0 mg/dL    Alkaline Phosphatase 85 55 - 135 U/L    AST 19 10 - 40 U/L    ALT 10 10 - 44 U/L    Anion Gap 15 8 - 16 mmol/L    eGFR 38 (A) >60 mL/min/1.73 m^2   CBC Auto Differential    Collection Time: 12/09/22  1:30 PM   Result Value Ref Range    WBC 6.25 3.90 - 12.70 K/uL    RBC 3.40 (L) 4.00 - 5.40 M/uL    Hemoglobin 10.4 (L) 12.0 -  16.0 g/dL    Hematocrit 32.7 (L) 37.0 - 48.5 %    MCV 96 82 - 98 fL    MCH 30.6 27.0 - 31.0 pg    MCHC 31.8 (L) 32.0 - 36.0 g/dL    RDW 13.5 11.5 - 14.5 %    Platelets 243 150 - 450 K/uL    MPV 10.7 9.2 - 12.9 fL    Immature Granulocytes 0.2 0.0 - 0.5 %    Gran # (ANC) 4.1 1.8 - 7.7 K/uL    Immature Grans (Abs) 0.01 0.00 - 0.04 K/uL    Lymph # 1.6 1.0 - 4.8 K/uL    Mono # 0.6 0.3 - 1.0 K/uL    Eos # 0.1 0.0 - 0.5 K/uL    Baso # 0.03 0.00 - 0.20 K/uL    nRBC 0 0 /100 WBC    Gran % 64.7 38.0 - 73.0 %    Lymph % 24.8 18.0 - 48.0 %    Mono % 8.8 4.0 - 15.0 %    Eosinophil % 1.0 0.0 - 8.0 %    Basophil % 0.5 0.0 - 1.9 %    Differential Method Automated    Comprehensive Metabolic Panel    Collection Time: 12/09/22  1:30 PM   Result Value Ref Range    Sodium 134 (L) 136 - 145 mmol/L    Potassium 4.2 3.5 - 5.1 mmol/L    Chloride 94 (L) 95 - 110 mmol/L    CO2 30 (H) 23 - 29 mmol/L    Glucose 110 70 - 110 mg/dL    BUN 15 8 - 23 mg/dL    Creatinine 1.2 0.5 - 1.4 mg/dL    Calcium 10.1 8.7 - 10.5 mg/dL    Total Protein 6.9 6.0 - 8.4 g/dL    Albumin 3.6 3.5 - 5.2 g/dL    Total Bilirubin 0.3 0.1 - 1.0 mg/dL    Alkaline Phosphatase 68 55 - 135 U/L    AST 18 10 - 40 U/L    ALT 8 (L) 10 - 44 U/L    Anion Gap 10 8 - 16 mmol/L    eGFR 50 (A) >60 mL/min/1.73 m^2   RENAL FUNCTION PANEL    Collection Time: 12/09/22  1:30 PM   Result Value Ref Range    Glucose 110 70 - 110 mg/dL    Sodium 134 (L) 136 - 145 mmol/L    Potassium 4.2 3.5 - 5.1 mmol/L    Chloride 94 (L) 95 - 110 mmol/L    CO2 30 (H) 23 - 29 mmol/L    BUN 15 8 - 23 mg/dL    Calcium 10.1 8.7 - 10.5 mg/dL    Creatinine 1.2 0.5 - 1.4 mg/dL    Albumin 3.6 3.5 - 5.2 g/dL    Phosphorus 3.1 2.7 - 4.5 mg/dL    eGFR 50 (A) >60 mL/min/1.73 m^2    Anion Gap 10 8 - 16 mmol/L   CBC Auto Differential    Collection Time: 01/06/23  2:30 PM   Result Value Ref Range    WBC 3.49 (L) 3.90 - 12.70 K/uL    RBC 3.69 (L) 4.00 - 5.40 M/uL    Hemoglobin 11.0 (L) 12.0 - 16.0 g/dL    Hematocrit 36.0 (L)  37.0 - 48.5 %    MCV 98 82 - 98 fL    MCH 29.8 27.0 - 31.0 pg    MCHC 30.6 (L) 32.0 - 36.0 g/dL    RDW 13.7 11.5 - 14.5 %    Platelets 178 150 - 450 K/uL    MPV 11.5 9.2 - 12.9 fL    Immature Granulocytes 0.3 0.0 - 0.5 %    Gran # (ANC) 1.8 1.8 - 7.7 K/uL    Immature Grans (Abs) 0.01 0.00 - 0.04 K/uL    Lymph # 1.1 1.0 - 4.8 K/uL    Mono # 0.4 0.3 - 1.0 K/uL    Eos # 0.1 0.0 - 0.5 K/uL    Baso # 0.01 0.00 - 0.20 K/uL    nRBC 0 0 /100 WBC    Gran % 50.7 38.0 - 73.0 %    Lymph % 32.4 18.0 - 48.0 %    Mono % 12.3 4.0 - 15.0 %    Eosinophil % 4.0 0.0 - 8.0 %    Basophil % 0.3 0.0 - 1.9 %    Differential Method Automated    CBC Auto Differential    Collection Time: 02/14/23  2:14 PM   Result Value Ref Range    WBC 12.04 3.90 - 12.70 K/uL    RBC 3.49 (L) 4.00 - 5.40 M/uL    Hemoglobin 10.5 (L) 12.0 - 16.0 g/dL    Hematocrit 33.2 (L) 37.0 - 48.5 %    MCV 95 82 - 98 fL    MCH 30.1 27.0 - 31.0 pg    MCHC 31.6 (L) 32.0 - 36.0 g/dL    RDW 15.6 (H) 11.5 - 14.5 %    Platelets 244 150 - 450 K/uL    MPV 11.2 9.2 - 12.9 fL    Immature Granulocytes 0.2 0.0 - 0.5 %    Gran # (ANC) 8.0 (H) 1.8 - 7.7 K/uL    Immature Grans (Abs) 0.03 0.00 - 0.04 K/uL    Lymph # 3.1 1.0 - 4.8 K/uL    Mono # 0.8 0.3 - 1.0 K/uL    Eos # 0.1 0.0 - 0.5 K/uL    Baso # 0.02 0.00 - 0.20 K/uL    nRBC 0 0 /100 WBC    Gran % 66.8 38.0 - 73.0 %    Lymph % 25.4 18.0 - 48.0 %    Mono % 6.5 4.0 - 15.0 %    Eosinophil % 0.9 0.0 - 8.0 %    Basophil % 0.2 0.0 - 1.9 %    Differential Method Automated    Comprehensive Metabolic Panel    Collection Time: 02/14/23  2:14 PM   Result Value Ref Range    Sodium 139 136 - 145 mmol/L    Potassium 4.3 3.5 - 5.1 mmol/L    Chloride 101 95 - 110 mmol/L    CO2 26 23 - 29 mmol/L    Glucose 86 70 - 110 mg/dL    BUN 32 (H) 8 - 23 mg/dL    Creatinine 1.5 (H) 0.5 - 1.4 mg/dL    Calcium 9.0 8.7 - 10.5 mg/dL    Total Protein 6.9 6.0 - 8.4 g/dL    Albumin 3.6 3.5 - 5.2 g/dL    Total Bilirubin 0.2 0.1 - 1.0 mg/dL    Alkaline Phosphatase 79 55  - 135 U/L    AST 18 10 - 40 U/L    ALT 11 10 - 44 U/L    Anion Gap 12 8 - 16 mmol/L    eGFR 38 (A) >60 mL/min/1.73 m^2   Urinalysis, Reflex to Urine Culture Urine, Clean Catch    Collection Time: 02/22/23  2:40 PM    Specimen: Urine   Result Value Ref Range    Specimen UA Urine, Clean Catch     Color, UA Yellow Yellow, Straw, Shalini    Appearance, UA Clear Clear    pH, UA 6.0 5.0 - 8.0    Specific Gravity, UA 1.010 1.005 - 1.030    Protein, UA Negative Negative    Glucose, UA Negative Negative    Ketones, UA Negative Negative    Bilirubin (UA) Negative Negative    Occult Blood UA Negative Negative    Nitrite, UA Negative Negative    Leukocytes, UA Negative Negative         Assessment    1. Left wrist pain    2. Fall from slip, trip, or stumble, initial encounter    3. Hyperlipidemia, unspecified hyperlipidemia type    4. Gastroesophageal reflux disease without esophagitis    5. Sleep difficulties    6. Chronic diastolic congestive heart failure    7. Primary hypertension          Plan    Jasmina was seen today for follow-up.    Diagnoses and all orders for this visit:    Left wrist pain  -     X-Ray Wrist Complete Left; Future  -     Ambulatory referral/consult to Sports Medicine; Future    Fall from slip, trip, or stumble, initial encounter  -     X-Ray Wrist Complete Left; Future  -     Ambulatory referral/consult to Sports Medicine; Future    Hyperlipidemia, unspecified hyperlipidemia type  -     rosuvastatin (CRESTOR) 10 MG tablet; Take 1 tablet (10 mg total) by mouth every evening.    Gastroesophageal reflux disease without esophagitis  -     pantoprazole (PROTONIX) 40 MG tablet; Take 1 tablet (40 mg total) by mouth once daily.    Sleep difficulties  -     mirtazapine (REMERON) 15 MG tablet; Take 1 tablet (15 mg total) by mouth nightly as needed (sleep).    Chronic diastolic congestive heart failure  -     metoprolol succinate (TOPROL-XL) 25 MG 24 hr tablet; Take 1 tablet (25 mg total) by mouth once daily.  -      bumetanide (BUMEX) 1 MG tablet; Take 0.5 tablets (0.5 mg total) by mouth every other day.    Primary hypertension  -     metoprolol succinate (TOPROL-XL) 25 MG 24 hr tablet; Take 1 tablet (25 mg total) by mouth once daily.  -     losartan (COZAAR) 25 MG tablet; Take 1 tablet (25 mg total) by mouth once daily.    Other orders  -     allopurinoL (ZYLOPRIM) 100 MG tablet; Take 1 tablet (100 mg total) by mouth once daily.      Physically, everything looks pretty good today.  Physically, everything looks pretty good today.  We will go ahead and get x-ray of the wrist to rule out fracture.  Recommended rest, ice/heat, Tylenol.  Keep it in the splint.  Follow-up with Dr. Zendejas if no improvement.    Medication refills sent to pharmacy.      Recommended she keep her follow-up appointments with specialists, as scheduled.      New handicap form given today.  Instructed her to take this to the [facility].      Recommended she complete the Cologuard test.  She states she will do it tomorrow.    FOLLOW-UP:  Follow up in about 6 months (around 9/1/2023) for check up.    I spent a total of 45 minutes face to face and non-face to face on the date of this visit.This includes time preparing to see the patient (eg, review of tests, notes), obtaining and/or reviewing additional history from an independent historian and/or outside medical records, documenting clinical information in the electronic health record, independently interpreting results and/or communicating results to the patient/family/caregiver, or care coordinator.    Signed by:  Brock Ortiz MD

## 2023-03-01 NOTE — PROGRESS NOTES
MsTiffanie Freedman,     Attached is the result of your recent mammogram.  As we discussed, everything looks good.  No signs of any suspicious masses, lesions, tumors, nor anything worrisome.      We can plan to repeat this in one year.

## 2023-03-03 ENCOUNTER — PATIENT MESSAGE (OUTPATIENT)
Dept: GASTROENTEROLOGY | Facility: CLINIC | Age: 68
End: 2023-03-03
Payer: MEDICARE

## 2023-03-06 ENCOUNTER — LAB VISIT (OUTPATIENT)
Dept: LAB | Facility: HOSPITAL | Age: 68
End: 2023-03-06
Attending: FAMILY MEDICINE
Payer: MEDICARE

## 2023-03-06 DIAGNOSIS — N28.9 FUNCTION KIDNEY DECREASED: ICD-10-CM

## 2023-03-06 DIAGNOSIS — N18.31 STAGE 3A CHRONIC KIDNEY DISEASE: ICD-10-CM

## 2023-03-06 DIAGNOSIS — D72.828 GRANULOCYTOSIS: ICD-10-CM

## 2023-03-06 DIAGNOSIS — K74.60 HEPATIC CIRRHOSIS, UNSPECIFIED HEPATIC CIRRHOSIS TYPE: ICD-10-CM

## 2023-03-06 LAB
ALBUMIN SERPL BCP-MCNC: 3.3 G/DL (ref 3.5–5.2)
ALP SERPL-CCNC: 115 U/L (ref 55–135)
ALT SERPL W/O P-5'-P-CCNC: 11 U/L (ref 10–44)
ANION GAP SERPL CALC-SCNC: 13 MMOL/L (ref 8–16)
AST SERPL-CCNC: 18 U/L (ref 10–40)
BASOPHILS # BLD AUTO: 0.01 K/UL (ref 0–0.2)
BASOPHILS NFR BLD: 0.1 % (ref 0–1.9)
BILIRUB SERPL-MCNC: 0.4 MG/DL (ref 0.1–1)
BUN SERPL-MCNC: 16 MG/DL (ref 8–23)
CALCIUM SERPL-MCNC: 8.9 MG/DL (ref 8.7–10.5)
CHLORIDE SERPL-SCNC: 99 MMOL/L (ref 95–110)
CO2 SERPL-SCNC: 25 MMOL/L (ref 23–29)
CREAT SERPL-MCNC: 1.2 MG/DL (ref 0.5–1.4)
DIFFERENTIAL METHOD: ABNORMAL
EOSINOPHIL # BLD AUTO: 0.2 K/UL (ref 0–0.5)
EOSINOPHIL NFR BLD: 2.5 % (ref 0–8)
ERYTHROCYTE [DISTWIDTH] IN BLOOD BY AUTOMATED COUNT: 15.9 % (ref 11.5–14.5)
EST. GFR  (NO RACE VARIABLE): 50 ML/MIN/1.73 M^2
GLUCOSE SERPL-MCNC: 118 MG/DL (ref 70–110)
HCT VFR BLD AUTO: 32.3 % (ref 37–48.5)
HGB BLD-MCNC: 10.3 G/DL (ref 12–16)
IMM GRANULOCYTES # BLD AUTO: 0.03 K/UL (ref 0–0.04)
IMM GRANULOCYTES NFR BLD AUTO: 0.4 % (ref 0–0.5)
LYMPHOCYTES # BLD AUTO: 1.8 K/UL (ref 1–4.8)
LYMPHOCYTES NFR BLD: 25 % (ref 18–48)
MCH RBC QN AUTO: 30 PG (ref 27–31)
MCHC RBC AUTO-ENTMCNC: 31.9 G/DL (ref 32–36)
MCV RBC AUTO: 94 FL (ref 82–98)
MONOCYTES # BLD AUTO: 0.6 K/UL (ref 0.3–1)
MONOCYTES NFR BLD: 8 % (ref 4–15)
NEUTROPHILS # BLD AUTO: 4.6 K/UL (ref 1.8–7.7)
NEUTROPHILS NFR BLD: 64 % (ref 38–73)
NRBC BLD-RTO: 0 /100 WBC
PLATELET # BLD AUTO: 281 K/UL (ref 150–450)
PMV BLD AUTO: 10.9 FL (ref 9.2–12.9)
POTASSIUM SERPL-SCNC: 4 MMOL/L (ref 3.5–5.1)
PROT SERPL-MCNC: 6.8 G/DL (ref 6–8.4)
RBC # BLD AUTO: 3.43 M/UL (ref 4–5.4)
SODIUM SERPL-SCNC: 137 MMOL/L (ref 136–145)
WBC # BLD AUTO: 7.16 K/UL (ref 3.9–12.7)

## 2023-03-06 PROCEDURE — 85025 COMPLETE CBC W/AUTO DIFF WBC: CPT | Performed by: NURSE PRACTITIONER

## 2023-03-06 PROCEDURE — 80053 COMPREHEN METABOLIC PANEL: CPT | Performed by: NURSE PRACTITIONER

## 2023-03-06 PROCEDURE — 36415 COLL VENOUS BLD VENIPUNCTURE: CPT | Mod: PN | Performed by: NURSE PRACTITIONER

## 2023-03-13 ENCOUNTER — TELEPHONE (OUTPATIENT)
Dept: PRIMARY CARE CLINIC | Facility: CLINIC | Age: 68
End: 2023-03-13
Payer: MEDICARE

## 2023-03-13 NOTE — TELEPHONE ENCOUNTER
----- Message from Hawa Talbert sent at 3/13/2023 12:47 PM CDT -----  Pt scheduled an appt for tomorrow but she would like  nurse to call her also. Call back number is .[phone]. Thx. EL

## 2023-03-13 NOTE — TELEPHONE ENCOUNTER
Spoke with pt and she advised me that she fell on Saturday. Pt also advised me that she didn't go to the Emergency room. Pt have appt for tomorrow

## 2023-03-14 LAB — NONINV COLON CA DNA+OCC BLD SCRN STL QL: NORMAL

## 2023-03-14 NOTE — PROGRESS NOTES
Ms. Anastasiya,     Unfortunately, when [facility] received your Cologuard kit it had gotten damaged in transit.  They were not able to process it.      The should be reaching out to you to arrange should min of a new kit.  When you receive it, please try to leave a sample and return it asap.      You may also want to call them at [phone] to make sure they send you a new kit.

## 2023-03-16 ENCOUNTER — OFFICE VISIT (OUTPATIENT)
Dept: HEMATOLOGY/ONCOLOGY | Facility: CLINIC | Age: 68
End: 2023-03-16
Payer: MEDICARE

## 2023-03-16 VITALS
DIASTOLIC BLOOD PRESSURE: 77 MMHG | WEIGHT: 107.13 LBS | HEIGHT: 62 IN | OXYGEN SATURATION: 88 % | HEART RATE: 76 BPM | SYSTOLIC BLOOD PRESSURE: 135 MMHG | BODY MASS INDEX: 19.71 KG/M2

## 2023-03-16 DIAGNOSIS — D64.9 ANEMIA, UNSPECIFIED TYPE: ICD-10-CM

## 2023-03-16 DIAGNOSIS — Z12.11 COLON CANCER SCREENING: ICD-10-CM

## 2023-03-16 DIAGNOSIS — M79.10 MYALGIA: ICD-10-CM

## 2023-03-16 DIAGNOSIS — S09.90XA HEAD TRAUMA, INITIAL ENCOUNTER: Primary | ICD-10-CM
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
## 2023-03-16 DIAGNOSIS — D53.9 NUTRITIONAL ANEMIA, UNSPECIFIED: ICD-10-CM

## 2023-03-16 DIAGNOSIS — K74.60 HEPATIC CIRRHOSIS, UNSPECIFIED HEPATIC CIRRHOSIS TYPE: ICD-10-CM

## 2023-03-16 PROCEDURE — 1100F PTFALLS ASSESS-DOCD GE2>/YR: CPT | Mod: CPTII,S$GLB,, | Performed by: NURSE PRACTITIONER

## 2023-03-16 PROCEDURE — 3008F PR BODY MASS INDEX (BMI) DOCUMENTED: ICD-10-PCS | Mod: CPTII,S$GLB,, | Performed by: NURSE PRACTITIONER

## 2023-03-16 PROCEDURE — 4010F ACE/ARB THERAPY RXD/TAKEN: CPT | Mod: CPTII,S$GLB,, | Performed by: NURSE PRACTITIONER

## 2023-03-16 PROCEDURE — 3075F PR MOST RECENT SYSTOLIC BLOOD PRESS GE 130-139MM HG: ICD-10-PCS | Mod: CPTII,S$GLB,, | Performed by: NURSE PRACTITIONER

## 2023-03-16 PROCEDURE — 3008F BODY MASS INDEX DOCD: CPT | Mod: CPTII,S$GLB,, | Performed by: NURSE PRACTITIONER

## 2023-03-16 PROCEDURE — 3288F FALL RISK ASSESSMENT DOCD: CPT | Mod: CPTII,S$GLB,, | Performed by: NURSE PRACTITIONER

## 2023-03-16 PROCEDURE — 3078F DIAST BP <80 MM HG: CPT | Mod: CPTII,S$GLB,, | Performed by: NURSE PRACTITIONER

## 2023-03-16 PROCEDURE — 4010F PR ACE/ARB THEARPY RXD/TAKEN: ICD-10-PCS | Mod: CPTII,S$GLB,, | Performed by: NURSE PRACTITIONER

## 2023-03-16 PROCEDURE — 99999 PR PBB SHADOW E&M-EST. PATIENT-LVL IV: CPT | Mod: PBBFAC,,, | Performed by: NURSE PRACTITIONER

## 2023-03-16 PROCEDURE — 1160F RVW MEDS BY RX/DR IN RCRD: CPT | Mod: CPTII,S$GLB,, | Performed by: NURSE PRACTITIONER

## 2023-03-16 PROCEDURE — 3078F PR MOST RECENT DIASTOLIC BLOOD PRESSURE < 80 MM HG: ICD-10-PCS | Mod: CPTII,S$GLB,, | Performed by: NURSE PRACTITIONER

## 2023-03-16 PROCEDURE — 99214 PR OFFICE/OUTPT VISIT, EST, LEVL IV, 30-39 MIN: ICD-10-PCS | Mod: S$GLB,,, | Performed by: NURSE PRACTITIONER

## 2023-03-16 PROCEDURE — 1126F AMNT PAIN NOTED NONE PRSNT: CPT | Mod: CPTII,S$GLB,, | Performed by: NURSE PRACTITIONER

## 2023-03-16 PROCEDURE — 1100F PR PT FALLS ASSESS DOC 2+ FALLS/FALL W/INJURY/YR: ICD-10-PCS | Mod: CPTII,S$GLB,, | Performed by: NURSE PRACTITIONER

## 2023-03-16 PROCEDURE — 99214 OFFICE O/P EST MOD 30 MIN: CPT | Mod: S$GLB,,, | Performed by: NURSE PRACTITIONER

## 2023-03-16 PROCEDURE — 3075F SYST BP GE 130 - 139MM HG: CPT | Mod: CPTII,S$GLB,, | Performed by: NURSE PRACTITIONER

## 2023-03-16 PROCEDURE — 3288F PR FALLS RISK ASSESSMENT DOCUMENTED: ICD-10-PCS | Mod: CPTII,S$GLB,, | Performed by: NURSE PRACTITIONER

## 2023-03-16 PROCEDURE — 99499 RISK ADDL DX/OHS AUDIT: ICD-10-PCS | Mod: S$GLB,,, | Performed by: NURSE PRACTITIONER

## 2023-03-16 PROCEDURE — 1159F PR MEDICATION LIST DOCUMENTED IN MEDICAL RECORD: ICD-10-PCS | Mod: CPTII,S$GLB,, | Performed by: NURSE PRACTITIONER

## 2023-03-16 PROCEDURE — 99499 UNLISTED E&M SERVICE: CPT | Mod: S$GLB,,, | Performed by: NURSE PRACTITIONER

## 2023-03-16 PROCEDURE — 99999 PR PBB SHADOW E&M-EST. PATIENT-LVL IV: ICD-10-PCS | Mod: PBBFAC,,, | Performed by: NURSE PRACTITIONER

## 2023-03-16 PROCEDURE — 1126F PR PAIN SEVERITY QUANTIFIED, NO PAIN PRESENT: ICD-10-PCS | Mod: CPTII,S$GLB,, | Performed by: NURSE PRACTITIONER

## 2023-03-16 PROCEDURE — 1160F PR REVIEW ALL MEDS BY PRESCRIBER/CLIN PHARMACIST DOCUMENTED: ICD-10-PCS | Mod: CPTII,S$GLB,, | Performed by: NURSE PRACTITIONER

## 2023-03-16 PROCEDURE — 1159F MED LIST DOCD IN RCRD: CPT | Mod: CPTII,S$GLB,, | Performed by: NURSE PRACTITIONER

## 2023-03-16 RX ORDER — FLUTICASONE PROPIONATE 50 MCG
SPRAY, SUSPENSION (ML) NASAL
COMMUNITY

## 2023-03-16 NOTE — PROGRESS NOTES
Subjective:      Patient ID: Jasmina Sharma is a 67 y.o. female.    Chief Complaint: fall    HPI:  67 year old female who presents today as a new patient for further evaluation and recommendation of anemia.  Has required blood transfusion in the past for symptomatic anemia.       Other medical diagnosis include severe COPD, acute on chronic diastolic heart failure, HTN, HPLD, CAD, alcohol abuse, portal gastropathy, states that she is on home O2 at night 2 liters.       She presents today with labs from 10/24/2022 showing macrocytic anemia hgb 7.8 mcv 101.  Not currently iron deficient.     She is not up to date with mammogram, colonoscopy, or pap.       Just recently hospitalized for 28 days for respiratory and kidney failure.  Was intubated and confused with increased CO2 and states that she does not remember anything.  Required 3 units of blood after the traumatic intubation/extubation.  States that she had a c diff infection during hospitalization.  She also had a large hematoma on her left inner leg.        Denies any abnormal blood loss.       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  States that she is taking folic acid daily.  States that she has been diagnosed with fatty liver disease.  Last checked about 20 years ago.      States that she used to drink a lot.  Stopped drinking end of 8/2022.  States that she was a heavy smoker.  Says that she smoked a couple of packs since she has been out of the hospital.  Started drinking when she was 56 years old when her ex and her broke up.  Would drink 1-2 bottles and wine and also rum and coke per night.  Per patient, was a very heavy drinker.    Has smoked cigarettes since she was 19 years.  smoking about 2 packs/day.       Cologuard was sent to her in the mail. She completed;however it was unable to be processed     2/23/2023 Mammogram - no evidence of malignancy  Is being followed by pulmonology.  Had CT chest 8/2022.  No evidence  of malignancy.       Denies h/o gastric surgeries in the past.     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     Family hx of cancer:  Father:  stage IV lung cancer - asbestosis     Denies f/c/ns or unintentional weight loss.  Denies any known lymphadenopathy.       Interval History  12/5/2022:  Presents today for f/u results and recommendation on anemia workup.    At last visit recommend continue folic acid supplementation daily and avoidance of etoh.  Abdominal US ordered d/t etoh use 11/16/2022 Impression:  1.  There is a slightly lobular surface to the liver.  Cirrhosis?  2.  Fatty infiltration of the pancreas.  3.  Cholelithiasis with gallbladder sludge, without ultrasound evidence for acute cholecystitis.  4.  Subcentimeter left renal cyst.  5.  Increased echogenicity to the kidneys, concerning for medical renal disease.  Clinical correlation is advised.     Hepatology consult placed for eval, has not seen as of yet.  Reschedule patient.      Noted decreased kidney function.  Patient was recently advised to increase Celebrex usage.  She has since reduced back down to once daily dose.  States that she is going to reschedule mammogram d/t having to have a dentist appointment d/t cracked crown.        2/16/2023  States that has had a cold for 2 months and has been placed on steroid for a month.  Has increased bruising with skin tear to left hand.  States that she would be willing to reschedule with hepatology for evaluation.  Denies urinary infection symptoms currently.  Taking bumex every other day.  States that probably not  drinking enough fluid.  States that she no longer drinks alcohol.  Has normocytic anemia with normal differential.  States that she has an appointment with nephrology upcoming.  Will try to complete cologuard testing and send in per patient.     Interval History:  3/16/2023 Had a fall on Saturday hitting her head.  She cannot remember  "how she fell.  Had right hand tremors.  States the tremors are calmed with "nerve pills".  Says that she feels anxious - uses oxygen at night and sometimes during the day.  Currently 88% on RA. Continues folic acid and prenatal vitamin.          Social History     Socioeconomic History    Marital status:    Tobacco Use    Smoking status: Every Day     Types: Cigarettes, Vaping w/o nicotine     Passive exposure: Past    Smokeless tobacco: Never   Substance and Sexual Activity    Alcohol use: Not Currently    Drug use: Never    Sexual activity: Not Currently       Family History   Problem Relation Age of Onset    Hypertension Mother     Memory loss Mother     Hypertension Father     Diabetes Father     Cancer Father        Past Surgical History:   Procedure Laterality Date    AUGMENTATION OF BREAST Bilateral     3 times    BREAST SURGERY         Past Medical History:   Diagnosis Date    COPD (chronic obstructive pulmonary disease)     Encounter for blood transfusion     Hyperlipidemia     Hypertension        Review of Systems   Constitutional: Negative.    HENT: Negative.     Eyes: Negative.    Respiratory: Negative.     Cardiovascular: Negative.    Gastrointestinal: Negative.    Endocrine: Negative.    Genitourinary: Negative.    Musculoskeletal:  Positive for arthralgias and myalgias.   Skin: Negative.    Allergic/Immunologic: Negative.    Neurological:  Positive for syncope.   Hematological:  Bruises/bleeds easily.   Psychiatric/Behavioral:          Difficulty finding words        Medication List with Changes/Refills   Current Medications    ALBUTEROL (PROVENTIL/VENTOLIN HFA) 90 MCG/ACTUATION INHALER    Inhale 2 puffs into the lungs every 6 (six) hours as needed.    ALBUTEROL-IPRATROPIUM (DUO-NEB) 2.5 MG-0.5 MG/3 ML NEBULIZER SOLUTION    Take by nebulization every 6 (six) hours as needed.    ALLOPURINOL (ZYLOPRIM) 100 MG TABLET    Take 1 tablet (100 mg total) by mouth once daily.    BUMETANIDE (BUMEX) 1 MG " TABLET    Take 0.5 tablets (0.5 mg total) by mouth every other day.    CLONAZEPAM (KLONOPIN) 0.5 MG TABLET    Take 0.25 mg by mouth nightly as needed.    FLUTICASONE PROPIONATE (FLONASE) 50 MCG/ACTUATION NASAL SPRAY    fluticasone propionate 50 mcg/actuation nasal spray,suspension   USE 1 SPRAY IN EACH NOSTRIL DAILY    FOLIC ACID (FOLVITE) 1 MG TABLET    Take 1 tablet (1,000 mcg total) by mouth every morning.    GLUCOSAMINE-CHONDROITIN 500-400 MG TABLET    Take 1 tablet by mouth 3 (three) times daily.    HYDROCODONE-ACETAMINOPHEN (NORCO) 7.5-325 MG PER TABLET    SMARTSI Tablet(s) By Mouth Every 12 Hours PRN    LORATADINE (CLARITIN) 10 MG TABLET    Take 1 tablet (10 mg total) by mouth once daily.    LOSARTAN (COZAAR) 25 MG TABLET    Take 1 tablet (25 mg total) by mouth once daily.    METOPROLOL SUCCINATE (TOPROL-XL) 25 MG 24 HR TABLET    Take 1 tablet (25 mg total) by mouth once daily.    MIRTAZAPINE (REMERON) 15 MG TABLET    Take 1 tablet (15 mg total) by mouth nightly as needed (sleep).    NALOXONE (NARCAN) 4 MG/ACTUATION SPRY    naloxone 4 mg/actuation nasal spray   SPRAY 1 SPRAY INTO ONE NOSTRIL REPEAT ANOTHER INTO OPPOSITE NOSTRIL IN 2 TO 3 MINUTES UNTIL PT RESPONSIVE OR HELP ARRIVES    PANTOPRAZOLE (PROTONIX) 40 MG TABLET    Take 1 tablet (40 mg total) by mouth once daily.    PRAMIPEXOLE (MIRAPEX) 0.75 MG TABLET    pramipexole 0.75 mg tablet   TAKE 1 TABLET BY MOUTH AT BEDTIME FOR RESTLESS LEGS    PREGABALIN (LYRICA) 50 MG CAPSULE    Take 50 mg by mouth 3 (three) times daily as needed.    ROSUVASTATIN (CRESTOR) 10 MG TABLET    Take 1 tablet (10 mg total) by mouth every evening.    TRELEGY ELLIPTA 100-62.5-25 MCG DSDV    SMARTSI Inhalation Via Inhaler Daily        Objective:     Vitals:    23 1512   BP: 135/77   Pulse: 76       Physical Exam  Constitutional:       Appearance: Normal appearance.   HENT:      Head: Normocephalic and atraumatic.   Pulmonary:      Effort: Pulmonary effort is normal.    Musculoskeletal:         General: Normal range of motion.   Skin:     Findings: Bruising and ecchymosis present.          Neurological:      General: No focal deficit present.      Mental Status: She is alert and oriented to person, place, and time.       Assessment:     Problem List Items Addressed This Visit          GI    Hepatic cirrhosis, unspecified hepatic cirrhosis type    Relevant Orders    CBC Auto Differential    Comprehensive Metabolic Panel     Other Visit Diagnoses       Head trauma, initial encounter    -  Primary    Relevant Orders    CT Head Without Contrast    CBC Auto Differential    Comprehensive Metabolic Panel    Fall, initial encounter        Anemia, unspecified type        Relevant Orders    CBC Auto Differential    Comprehensive Metabolic Panel    Ferritin    Iron and TIBC    Vitamin B12    Colon cancer screening        Relevant Orders    Cologuard Screening (Multitarget Stool DNA)    Nutritional anemia, unspecified        Relevant Orders    Vitamin B12            Lab Results   Component Value Date    WBC 7.16 03/06/2023    RBC 3.43 (L) 03/06/2023    HGB 10.3 (L) 03/06/2023    HCT 32.3 (L) 03/06/2023    MCV 94 03/06/2023    MCH 30.0 03/06/2023    MCHC 31.9 (L) 03/06/2023    RDW 15.9 (H) 03/06/2023     03/06/2023    MPV 10.9 03/06/2023    GRAN 4.6 03/06/2023    GRAN 64.0 03/06/2023    LYMPH 1.8 03/06/2023    LYMPH 25.0 03/06/2023    MONO 0.6 03/06/2023    MONO 8.0 03/06/2023    EOS 0.2 03/06/2023    BASO 0.01 03/06/2023    EOSINOPHIL 2.5 03/06/2023    BASOPHIL 0.1 03/06/2023      Lab Results   Component Value Date     03/06/2023    K 4.0 03/06/2023    CL 99 03/06/2023    CO2 25 03/06/2023    BUN 16 03/06/2023    CREATININE 1.2 03/06/2023    CALCIUM 8.9 03/06/2023    ANIONGAP 13 03/06/2023     Lab Results   Component Value Date    ALT 11 03/06/2023    AST 18 03/06/2023    ALKPHOS 115 03/06/2023    BILITOT 0.4 03/06/2023       Plan:   Head trauma, initial encounter  -     CT Head  Without Contrast; Future; Expected date: 03/16/2023  -     CBC Auto Differential; Future; Expected date: 03/16/2023  -     Comprehensive Metabolic Panel; Future; Expected date: 03/16/2023    Hepatic cirrhosis, unspecified hepatic cirrhosis type  -     CBC Auto Differential; Future; Expected date: 03/16/2023  -     Comprehensive Metabolic Panel; Future; Expected date: 03/16/2023    Fall, initial encounter    Anemia, unspecified type  -     CBC Auto Differential; Future; Expected date: 03/16/2023  -     Comprehensive Metabolic Panel; Future; Expected date: 03/16/2023  -     Ferritin; Future; Expected date: 03/16/2023  -     Iron and TIBC; Future; Expected date: 03/16/2023  -     Vitamin B12; Future; Expected date: 03/16/2023    Colon cancer screening  -     Cologuard Screening (Multitarget Stool DNA); Future; Expected date: 03/16/2023    Nutritional anemia, unspecified  -     Vitamin B12; Future; Expected date: 03/16/2023      Med Onc Chart Routing      Follow up with physician    Follow up with KEITH 3 months. f/u in 3 months with labs prior in [place].  Patient like afternoon appointments   Infusion scheduling note n/a   Injection scheduling note n/a   Labs   Scheduling:  Preferred lab:  Lab interval:  See above   Imaging   CT head without contrast   Pharmacy appointment No pharmacy appointment needed      Other referrals  No additional referrals needed            Collaborating Provider:  Dr. Jose Beltran    Thank You,  Sundar Guzman, FNP-C  Hematology Oncology

## 2023-03-20 ENCOUNTER — PATIENT MESSAGE (OUTPATIENT)
Dept: HEMATOLOGY/ONCOLOGY | Facility: CLINIC | Age: 68
End: 2023-03-20
Payer: MEDICARE

## 2023-03-20 ENCOUNTER — HOSPITAL ENCOUNTER (OUTPATIENT)
Dept: RADIOLOGY | Facility: HOSPITAL | Age: 68
Discharge: HOME | End: 2023-03-20
Attending: NURSE PRACTITIONER
Payer: MEDICARE

## 2023-03-20 DIAGNOSIS — S09.90XA HEAD TRAUMA, INITIAL ENCOUNTER: ICD-10-CM

## 2023-03-20 PROCEDURE — 70450 CT HEAD/BRAIN W/O DYE: CPT | Mod: 26,,, | Performed by: STUDENT IN AN ORGANIZED HEALTH CARE EDUCATION/TRAINING PROGRAM

## 2023-03-20 PROCEDURE — 70450 CT HEAD/BRAIN W/O DYE: CPT | Mod: TC,PN

## 2023-03-20 PROCEDURE — 70450 CT HEAD WITHOUT CONTRAST: ICD-10-PCS | Mod: 26,,, | Performed by: STUDENT IN AN ORGANIZED HEALTH CARE EDUCATION/TRAINING PROGRAM

## 2023-03-22 ENCOUNTER — PATIENT MESSAGE (OUTPATIENT)
Dept: PRIMARY CARE CLINIC | Facility: CLINIC | Age: 68
End: 2023-03-22
Payer: MEDICARE

## 2023-03-22 RX ORDER — CELECOXIB 50 MG/1
50 CAPSULE ORAL 2 TIMES DAILY
Qty: 60 CAPSULE | Refills: 0 | Status: SHIPPED | OUTPATIENT
Start: 2023-03-22 | End: 2023-06-07

## 2023-03-29 ENCOUNTER — PES CALL (OUTPATIENT)
Dept: ADMINISTRATIVE | Facility: CLINIC | Age: 68
End: 2023-03-29
Payer: MEDICARE

## 2023-04-16 ENCOUNTER — PATIENT MESSAGE (OUTPATIENT)
Dept: PRIMARY CARE CLINIC | Facility: CLINIC | Age: 68
End: 2023-04-16
Payer: MEDICARE

## 2023-04-17 NOTE — TELEPHONE ENCOUNTER
----- Message from Chance Hein sent at 4/17/2023  4:06 PM CDT -----  Contact: Deirdre montano- [phone]  Patient is requesting a call back regarding a special cream. Please give her a call back to advise at [phone]

## 2023-04-19 ENCOUNTER — PES CALL (OUTPATIENT)
Dept: ADMINISTRATIVE | Facility: CLINIC | Age: 68
End: 2023-04-19
Payer: MEDICARE

## 2023-04-27 ENCOUNTER — PATIENT MESSAGE (OUTPATIENT)
Dept: HEMATOLOGY/ONCOLOGY | Facility: CLINIC | Age: 68
End: 2023-04-27
Payer: MEDICARE

## 2023-04-28 DIAGNOSIS — D51.9 ANEMIA DUE TO VITAMIN B12 DEFICIENCY, UNSPECIFIED B12 DEFICIENCY TYPE: Primary | ICD-10-CM

## 2023-04-28 RX ORDER — CYANOCOBALAMIN 1000 UG/ML
1000 INJECTION, SOLUTION INTRAMUSCULAR; SUBCUTANEOUS
Qty: 6 ML | Refills: 0 | Status: SHIPPED | OUTPATIENT
Start: 2023-04-28 | End: 2023-05-25 | Stop reason: SDUPTHER

## 2023-05-06 ENCOUNTER — NURSE TRIAGE (OUTPATIENT)
Dept: ADMINISTRATIVE | Facility: CLINIC | Age: 68
End: 2023-05-06
Payer: MEDICARE

## 2023-05-06 NOTE — TELEPHONE ENCOUNTER
Advised 911 by MARIA Cornell to call 911. Patient states she is new to [facility] and asking where ED is as her daughter will bring her. Advised [facility] ED was on [address]. Patient lives in [place], offered option of [facility] as would be closer, also stressed to patient need to call 911 as they bring the ED to you. Verb understanding.   Reason for Disposition   Caller has already spoken with another triager or PCP AND has further questions AND triager able to answer questions.    Additional Information   Caller has already spoken to PCP or another triager    Protocols used: Information Only Call - No Triage-A-AH, No Contact or Duplicate Contact Call-A-AH

## 2023-05-06 NOTE — TELEPHONE ENCOUNTER
"  Reason for Disposition   [1] SEVERE weakness (i.e., unable to walk or barely able to walk, requires support) AND [2] new-onset or worsening    Additional Information   Negative: SEVERE difficulty breathing (e.g., struggling for each breath, speaks in single words)   Negative: Shock suspected (e.g., cold/pale/clammy skin, too weak to stand, low BP, rapid pulse)   Negative: Difficult to awaken or acting confused (e.g., disoriented, slurred speech)   Negative: [1] Fainted > 15 minutes ago AND [2] still feels too weak or dizzy to stand    Protocols used: Weakness (Generalized) and Fatigue-A-    Jasmina called to say she has been "very wobbly the last couple of days."  Feels weak, has COPD, CHF, CAD, HTN.  Has to hang / hold on for support when walking from her counter to the chair in the living room. She states she is on home 02, 2 Lpm, nights only, but now needing it most of the time. Is audibly SOB to this nurse during call.  Has not checked her pulse ox; request she do so now. She did locate the pulse ox but it does not turn on, she said.  Per [facility] triage protocol, recommend hang up now and call 911 for immediate medical attention.  She says she will call her daughter to bring her to the ED; she lives 10 minutes away.  Again, strongly encouraged call to 911. Message to Brock Ortiz MD, pcp, Dr Asad Reyna, pulmonology, and Lore Guzman NP, hem onc.  Please contact caller directly with any additional care advice.     "

## 2023-05-08 ENCOUNTER — TELEPHONE (OUTPATIENT)
Dept: HEMATOLOGY/ONCOLOGY | Facility: CLINIC | Age: 68
End: 2023-05-08
Payer: MEDICARE

## 2023-05-08 NOTE — TELEPHONE ENCOUNTER
Nurse spoke with pt in regards to rescheduling her appt due to recent hospital visit. Pt has been rescheduled. Verbalized understanding of changes. Call ended well

## 2023-05-12 ENCOUNTER — TELEPHONE (OUTPATIENT)
Dept: PRIMARY CARE CLINIC | Facility: CLINIC | Age: 68
End: 2023-05-12
Payer: MEDICARE

## 2023-05-12 NOTE — TELEPHONE ENCOUNTER
----- Message from Leslie Ma sent at 5/12/2023  2:55 PM CDT -----  Contact: pt  Type:  Patient Returning Call    Who Called: pt  Who Left Message for Patient: nurse  Does the patient know what this is regarding?: appt  Would the patient rather a call back or a response via [facility]?  phone  Best Call Back Number: [phone]  Additional Information:

## 2023-05-12 NOTE — TELEPHONE ENCOUNTER
----- Message from Natanael Abbott sent at 5/12/2023  1:24 PM CDT -----  Contact: Jasmina  Patient is calling to discuss if she can be seen on 6/7 around 1:20 with her mother for a hospital f/u, patient declined different appt time on the same day. Please call patient to confirm if she can be at the same time if possible at .[phone]

## 2023-05-23 ENCOUNTER — LAB VISIT (OUTPATIENT)
Dept: LAB | Facility: HOSPITAL | Age: 68
End: 2023-05-23
Attending: NURSE PRACTITIONER
Payer: MEDICARE

## 2023-05-23 DIAGNOSIS — K74.60 HEPATIC CIRRHOSIS, UNSPECIFIED HEPATIC CIRRHOSIS TYPE: ICD-10-CM

## 2023-05-23 DIAGNOSIS — N28.9 FUNCTION KIDNEY DECREASED: ICD-10-CM

## 2023-05-23 DIAGNOSIS — D64.9 ANEMIA, UNSPECIFIED TYPE: ICD-10-CM

## 2023-05-23 DIAGNOSIS — N18.31 STAGE 3A CHRONIC KIDNEY DISEASE: ICD-10-CM

## 2023-05-23 DIAGNOSIS — S09.90XA HEAD TRAUMA, INITIAL ENCOUNTER: ICD-10-CM

## 2023-05-23 DIAGNOSIS — D72.828 GRANULOCYTOSIS: ICD-10-CM

## 2023-05-23 DIAGNOSIS — D53.9 NUTRITIONAL ANEMIA, UNSPECIFIED: ICD-10-CM

## 2023-05-23 LAB
ALBUMIN SERPL BCP-MCNC: 3.2 G/DL (ref 3.5–5.2)
ALBUMIN SERPL BCP-MCNC: 3.2 G/DL (ref 3.5–5.2)
ALP SERPL-CCNC: 82 U/L (ref 55–135)
ALP SERPL-CCNC: 82 U/L (ref 55–135)
ALT SERPL W/O P-5'-P-CCNC: 13 U/L (ref 10–44)
ALT SERPL W/O P-5'-P-CCNC: 13 U/L (ref 10–44)
ANION GAP SERPL CALC-SCNC: 10 MMOL/L (ref 8–16)
ANION GAP SERPL CALC-SCNC: 10 MMOL/L (ref 8–16)
AST SERPL-CCNC: 16 U/L (ref 10–40)
AST SERPL-CCNC: 16 U/L (ref 10–40)
BASOPHILS # BLD AUTO: 0.01 K/UL (ref 0–0.2)
BASOPHILS # BLD AUTO: 0.01 K/UL (ref 0–0.2)
BASOPHILS NFR BLD: 0.1 % (ref 0–1.9)
BASOPHILS NFR BLD: 0.1 % (ref 0–1.9)
BILIRUB SERPL-MCNC: 0.3 MG/DL (ref 0.1–1)
BILIRUB SERPL-MCNC: 0.3 MG/DL (ref 0.1–1)
BUN SERPL-MCNC: 33 MG/DL (ref 8–23)
BUN SERPL-MCNC: 33 MG/DL (ref 8–23)
CALCIUM SERPL-MCNC: 9.2 MG/DL (ref 8.7–10.5)
CALCIUM SERPL-MCNC: 9.2 MG/DL (ref 8.7–10.5)
CHLORIDE SERPL-SCNC: 98 MMOL/L (ref 95–110)
CHLORIDE SERPL-SCNC: 98 MMOL/L (ref 95–110)
CO2 SERPL-SCNC: 28 MMOL/L (ref 23–29)
CO2 SERPL-SCNC: 28 MMOL/L (ref 23–29)
CREAT SERPL-MCNC: 1.8 MG/DL (ref 0.5–1.4)
CREAT SERPL-MCNC: 1.8 MG/DL (ref 0.5–1.4)
DIFFERENTIAL METHOD: ABNORMAL
DIFFERENTIAL METHOD: ABNORMAL
EOSINOPHIL # BLD AUTO: 0.1 K/UL (ref 0–0.5)
EOSINOPHIL # BLD AUTO: 0.1 K/UL (ref 0–0.5)
EOSINOPHIL NFR BLD: 1.6 % (ref 0–8)
EOSINOPHIL NFR BLD: 1.6 % (ref 0–8)
ERYTHROCYTE [DISTWIDTH] IN BLOOD BY AUTOMATED COUNT: 14.6 % (ref 11.5–14.5)
ERYTHROCYTE [DISTWIDTH] IN BLOOD BY AUTOMATED COUNT: 14.6 % (ref 11.5–14.5)
EST. GFR  (NO RACE VARIABLE): 30 ML/MIN/1.73 M^2
EST. GFR  (NO RACE VARIABLE): 30 ML/MIN/1.73 M^2
GLUCOSE SERPL-MCNC: 207 MG/DL (ref 70–110)
GLUCOSE SERPL-MCNC: 207 MG/DL (ref 70–110)
HCT VFR BLD AUTO: 32.7 % (ref 37–48.5)
HCT VFR BLD AUTO: 32.7 % (ref 37–48.5)
HGB BLD-MCNC: 10.3 G/DL (ref 12–16)
HGB BLD-MCNC: 10.3 G/DL (ref 12–16)
IMM GRANULOCYTES # BLD AUTO: 0.04 K/UL (ref 0–0.04)
IMM GRANULOCYTES # BLD AUTO: 0.04 K/UL (ref 0–0.04)
IMM GRANULOCYTES NFR BLD AUTO: 0.6 % (ref 0–0.5)
IMM GRANULOCYTES NFR BLD AUTO: 0.6 % (ref 0–0.5)
LYMPHOCYTES # BLD AUTO: 1 K/UL (ref 1–4.8)
LYMPHOCYTES # BLD AUTO: 1 K/UL (ref 1–4.8)
LYMPHOCYTES NFR BLD: 14.2 % (ref 18–48)
LYMPHOCYTES NFR BLD: 14.2 % (ref 18–48)
MCH RBC QN AUTO: 30.2 PG (ref 27–31)
MCH RBC QN AUTO: 30.2 PG (ref 27–31)
MCHC RBC AUTO-ENTMCNC: 31.5 G/DL (ref 32–36)
MCHC RBC AUTO-ENTMCNC: 31.5 G/DL (ref 32–36)
MCV RBC AUTO: 96 FL (ref 82–98)
MCV RBC AUTO: 96 FL (ref 82–98)
MONOCYTES # BLD AUTO: 0.7 K/UL (ref 0.3–1)
MONOCYTES # BLD AUTO: 0.7 K/UL (ref 0.3–1)
MONOCYTES NFR BLD: 10.1 % (ref 4–15)
MONOCYTES NFR BLD: 10.1 % (ref 4–15)
NEUTROPHILS # BLD AUTO: 5 K/UL (ref 1.8–7.7)
NEUTROPHILS # BLD AUTO: 5 K/UL (ref 1.8–7.7)
NEUTROPHILS NFR BLD: 73.4 % (ref 38–73)
NEUTROPHILS NFR BLD: 73.4 % (ref 38–73)
NRBC BLD-RTO: 0 /100 WBC
NRBC BLD-RTO: 0 /100 WBC
PLATELET # BLD AUTO: 163 K/UL (ref 150–450)
PLATELET # BLD AUTO: 163 K/UL (ref 150–450)
PMV BLD AUTO: 11.5 FL (ref 9.2–12.9)
PMV BLD AUTO: 11.5 FL (ref 9.2–12.9)
POTASSIUM SERPL-SCNC: 5 MMOL/L (ref 3.5–5.1)
POTASSIUM SERPL-SCNC: 5 MMOL/L (ref 3.5–5.1)
PROT SERPL-MCNC: 6.7 G/DL (ref 6–8.4)
PROT SERPL-MCNC: 6.7 G/DL (ref 6–8.4)
RBC # BLD AUTO: 3.41 M/UL (ref 4–5.4)
RBC # BLD AUTO: 3.41 M/UL (ref 4–5.4)
SODIUM SERPL-SCNC: 136 MMOL/L (ref 136–145)
SODIUM SERPL-SCNC: 136 MMOL/L (ref 136–145)
WBC # BLD AUTO: 6.76 K/UL (ref 3.9–12.7)
WBC # BLD AUTO: 6.76 K/UL (ref 3.9–12.7)

## 2023-05-23 PROCEDURE — 36415 COLL VENOUS BLD VENIPUNCTURE: CPT | Mod: PN | Performed by: NURSE PRACTITIONER

## 2023-05-23 PROCEDURE — 82607 VITAMIN B-12: CPT | Performed by: NURSE PRACTITIONER

## 2023-05-23 PROCEDURE — 80053 COMPREHEN METABOLIC PANEL: CPT | Performed by: NURSE PRACTITIONER

## 2023-05-23 PROCEDURE — 84466 ASSAY OF TRANSFERRIN: CPT | Performed by: NURSE PRACTITIONER

## 2023-05-23 PROCEDURE — 82728 ASSAY OF FERRITIN: CPT | Performed by: NURSE PRACTITIONER

## 2023-05-23 PROCEDURE — 85025 COMPLETE CBC W/AUTO DIFF WBC: CPT | Performed by: NURSE PRACTITIONER

## 2023-05-24 LAB
FERRITIN SERPL-MCNC: 459 NG/ML (ref 20–300)
IRON SERPL-MCNC: 14 UG/DL (ref 30–160)
SATURATED IRON: 5 % (ref 20–50)
TOTAL IRON BINDING CAPACITY: 308 UG/DL (ref 250–450)
TRANSFERRIN SERPL-MCNC: 208 MG/DL (ref 200–375)
VIT B12 SERPL-MCNC: 1941 PG/ML (ref 210–950)

## 2023-05-25 ENCOUNTER — OFFICE VISIT (OUTPATIENT)
Dept: HEMATOLOGY/ONCOLOGY | Facility: CLINIC | Age: 68
End: 2023-05-25
Payer: MEDICARE

## 2023-05-25 VITALS
SYSTOLIC BLOOD PRESSURE: 124 MMHG | HEART RATE: 78 BPM | DIASTOLIC BLOOD PRESSURE: 78 MMHG | WEIGHT: 120.13 LBS | OXYGEN SATURATION: 90 % | BODY MASS INDEX: 22.11 KG/M2 | HEIGHT: 62 IN

## 2023-05-25 DIAGNOSIS — Z12.11 COLON CANCER SCREENING: ICD-10-CM

## 2023-05-25 DIAGNOSIS — N61.0 MASTITIS IN FEMALE: ICD-10-CM

## 2023-05-25 DIAGNOSIS — D50.9 IRON DEFICIENCY ANEMIA, UNSPECIFIED IRON DEFICIENCY ANEMIA TYPE: ICD-10-CM

## 2023-05-25 DIAGNOSIS — D64.9 NORMOCYTIC ANEMIA: Primary | ICD-10-CM

## 2023-05-25 DIAGNOSIS — D51.9 ANEMIA DUE TO VITAMIN B12 DEFICIENCY, UNSPECIFIED B12 DEFICIENCY TYPE: ICD-10-CM

## 2023-05-25 PROCEDURE — 3074F SYST BP LT 130 MM HG: CPT | Mod: CPTII,S$GLB,, | Performed by: NURSE PRACTITIONER

## 2023-05-25 PROCEDURE — 1125F AMNT PAIN NOTED PAIN PRSNT: CPT | Mod: CPTII,S$GLB,, | Performed by: NURSE PRACTITIONER

## 2023-05-25 PROCEDURE — 3078F DIAST BP <80 MM HG: CPT | Mod: CPTII,S$GLB,, | Performed by: NURSE PRACTITIONER

## 2023-05-25 PROCEDURE — 1159F MED LIST DOCD IN RCRD: CPT | Mod: CPTII,S$GLB,, | Performed by: NURSE PRACTITIONER

## 2023-05-25 PROCEDURE — 1160F RVW MEDS BY RX/DR IN RCRD: CPT | Mod: CPTII,S$GLB,, | Performed by: NURSE PRACTITIONER

## 2023-05-25 PROCEDURE — 1100F PTFALLS ASSESS-DOCD GE2>/YR: CPT | Mod: CPTII,S$GLB,, | Performed by: NURSE PRACTITIONER

## 2023-05-25 PROCEDURE — 99214 OFFICE O/P EST MOD 30 MIN: CPT | Mod: S$GLB,,, | Performed by: NURSE PRACTITIONER

## 2023-05-25 PROCEDURE — 99499 UNLISTED E&M SERVICE: CPT | Mod: S$GLB,,, | Performed by: NURSE PRACTITIONER

## 2023-05-25 PROCEDURE — 1100F PR PT FALLS ASSESS DOC 2+ FALLS/FALL W/INJURY/YR: ICD-10-PCS | Mod: CPTII,S$GLB,, | Performed by: NURSE PRACTITIONER

## 2023-05-25 PROCEDURE — 99999 PR PBB SHADOW E&M-EST. PATIENT-LVL IV: ICD-10-PCS | Mod: PBBFAC,,, | Performed by: NURSE PRACTITIONER

## 2023-05-25 PROCEDURE — 3008F BODY MASS INDEX DOCD: CPT | Mod: CPTII,S$GLB,, | Performed by: NURSE PRACTITIONER

## 2023-05-25 PROCEDURE — 4010F ACE/ARB THERAPY RXD/TAKEN: CPT | Mod: CPTII,S$GLB,, | Performed by: NURSE PRACTITIONER

## 2023-05-25 PROCEDURE — 1125F PR PAIN SEVERITY QUANTIFIED, PAIN PRESENT: ICD-10-PCS | Mod: CPTII,S$GLB,, | Performed by: NURSE PRACTITIONER

## 2023-05-25 PROCEDURE — 99999 PR PBB SHADOW E&M-EST. PATIENT-LVL IV: CPT | Mod: PBBFAC,,, | Performed by: NURSE PRACTITIONER

## 2023-05-25 PROCEDURE — 1160F PR REVIEW ALL MEDS BY PRESCRIBER/CLIN PHARMACIST DOCUMENTED: ICD-10-PCS | Mod: CPTII,S$GLB,, | Performed by: NURSE PRACTITIONER

## 2023-05-25 PROCEDURE — 1159F PR MEDICATION LIST DOCUMENTED IN MEDICAL RECORD: ICD-10-PCS | Mod: CPTII,S$GLB,, | Performed by: NURSE PRACTITIONER

## 2023-05-25 PROCEDURE — 4010F PR ACE/ARB THEARPY RXD/TAKEN: ICD-10-PCS | Mod: CPTII,S$GLB,, | Performed by: NURSE PRACTITIONER

## 2023-05-25 PROCEDURE — 3288F FALL RISK ASSESSMENT DOCD: CPT | Mod: CPTII,S$GLB,, | Performed by: NURSE PRACTITIONER

## 2023-05-25 PROCEDURE — 3074F PR MOST RECENT SYSTOLIC BLOOD PRESSURE < 130 MM HG: ICD-10-PCS | Mod: CPTII,S$GLB,, | Performed by: NURSE PRACTITIONER

## 2023-05-25 PROCEDURE — 3078F PR MOST RECENT DIASTOLIC BLOOD PRESSURE < 80 MM HG: ICD-10-PCS | Mod: CPTII,S$GLB,, | Performed by: NURSE PRACTITIONER

## 2023-05-25 PROCEDURE — 3008F PR BODY MASS INDEX (BMI) DOCUMENTED: ICD-10-PCS | Mod: CPTII,S$GLB,, | Performed by: NURSE PRACTITIONER

## 2023-05-25 PROCEDURE — 99499 RISK ADDL DX/OHS AUDIT: ICD-10-PCS | Mod: S$GLB,,, | Performed by: NURSE PRACTITIONER

## 2023-05-25 PROCEDURE — 99214 PR OFFICE/OUTPT VISIT, EST, LEVL IV, 30-39 MIN: ICD-10-PCS | Mod: S$GLB,,, | Performed by: NURSE PRACTITIONER

## 2023-05-25 PROCEDURE — 3288F PR FALLS RISK ASSESSMENT DOCUMENTED: ICD-10-PCS | Mod: CPTII,S$GLB,, | Performed by: NURSE PRACTITIONER

## 2023-05-25 RX ORDER — CYANOCOBALAMIN 1000 UG/ML
1000 INJECTION, SOLUTION INTRAMUSCULAR; SUBCUTANEOUS
Qty: 6 ML | Refills: 0 | Status: SHIPPED | OUTPATIENT
Start: 2023-05-25 | End: 2024-01-22 | Stop reason: SDUPTHER

## 2023-05-25 RX ORDER — DOXYCYCLINE 100 MG/1
100 CAPSULE ORAL EVERY 12 HOURS
Qty: 20 CAPSULE | Refills: 0 | Status: SHIPPED | OUTPATIENT
Start: 2023-05-25 | End: 2023-06-05 | Stop reason: ALTCHOICE

## 2023-05-25 RX ORDER — FERROUS SULFATE 325(65) MG
325 TABLET ORAL DAILY
Qty: 90 TABLET | Refills: 0 | Status: SHIPPED | OUTPATIENT
Start: 2023-05-25 | End: 2024-01-22 | Stop reason: SDUPTHER

## 2023-05-25 RX ORDER — FLUCONAZOLE 150 MG/1
150 TABLET ORAL ONCE
Qty: 1 TABLET | Refills: 0 | Status: SHIPPED | OUTPATIENT
Start: 2023-05-25 | End: 2023-05-25

## 2023-05-25 RX ORDER — MEDROXYPROGESTERONE ACETATE 10 MG/1
10 TABLET ORAL
COMMUNITY
Start: 2023-05-10 | End: 2023-10-31

## 2023-05-25 NOTE — PROGRESS NOTES
Subjective:      Patient ID: Jasmina Sharma is a 68 y.o. female.    Chief Complaint: left breast tenderness    HPI:  68 year old female who presents today as a new patient for further evaluation and recommendation of anemia.  Has required blood transfusion in the past for symptomatic anemia.       Other medical diagnosis include severe COPD, acute on chronic diastolic heart failure, HTN, HPLD, CAD, alcohol abuse, portal gastropathy, states that she is on home O2 at night 2 liters.       She presents today with labs from 10/24/2022 showing macrocytic anemia hgb 7.8 mcv 101.  Not currently iron deficient.     She is not up to date with mammogram, colonoscopy, or pap.       Just recently hospitalized for 28 days for respiratory and kidney failure.  Was intubated and confused with increased CO2 and states that she does not remember anything.  Required 3 units of blood after the traumatic intubation/extubation.  States that she had a c diff infection during hospitalization.  She also had a large hematoma on her left inner leg.        Denies any abnormal blood loss.       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  States that she is taking folic acid daily.  States that she has been diagnosed with fatty liver disease.  Last checked about 20 years ago.      States that she used to drink a lot.  Stopped drinking end of 8/2022.  States that she was a heavy smoker.  Says that she smoked a couple of packs since she has been out of the hospital.  Started drinking when she was 56 years old when her ex and her broke up.  Would drink 1-2 bottles and wine and also rum and coke per night.  Per patient, was a very heavy drinker.    Has smoked cigarettes since she was 19 years.  smoking about 2 packs/day.       Cologuard was sent to her in the mail. She completed;however it was unable to be processed      2/23/2023 Mammogram - no evidence of malignancy  Is being followed by pulmonology.  Had CT chest  8/2022.  No evidence of malignancy.       Denies h/o gastric surgeries in the past.     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     Family hx of cancer:  Father:  stage IV lung cancer - asbestosis     Denies f/c/ns or unintentional weight loss.  Denies any known lymphadenopathy.       Interval History  12/5/2022:  Presents today for f/u results and recommendation on anemia workup.    At last visit recommend continue folic acid supplementation daily and avoidance of etoh.  Abdominal US ordered d/t etoh use 11/16/2022 Impression:  1.  There is a slightly lobular surface to the liver.  Cirrhosis?  2.  Fatty infiltration of the pancreas.  3.  Cholelithiasis with gallbladder sludge, without ultrasound evidence for acute cholecystitis.  4.  Subcentimeter left renal cyst.  5.  Increased echogenicity to the kidneys, concerning for medical renal disease.  Clinical correlation is advised.     Hepatology consult placed for eval, has not seen as of yet.  Reschedule patient.      Noted decreased kidney function.  Patient was recently advised to increase Celebrex usage.  She has since reduced back down to once daily dose.  States that she is going to reschedule mammogram d/t having to have a dentist appointment d/t cracked crown.        2/16/2023  States that has had a cold for 2 months and has been placed on steroid for a month.  Has increased bruising with skin tear to left hand.  States that she would be willing to reschedule with hepatology for evaluation.  Denies urinary infection symptoms currently.  Taking bumex every other day.  States that probably not  drinking enough fluid.  States that she no longer drinks alcohol.  Has normocytic anemia with normal differential.  States that she has an appointment with nephrology upcoming.  Will try to complete cologuard testing and send in per patient.      Interval History:  3/16/2023 Had a fall on Saturday hitting her head.   "She cannot remember how she fell.  Had right hand tremors.  States the tremors are calmed with "nerve pills".  Says that she feels anxious - uses oxygen at night and sometimes during the day.  Currently 88% on RA. Continues folic acid and prenatal vitamin.      Interval History:  5/25/2023 Patient states that she took progesterone pills from 5/10-5/17/2023 prescribed by her pulmonology - caused breast tenderness and swelling - mostly the left - it has improved since stopping however is still present.    2/2023 mammogram - negative for malignancy    Has not seen hepatology as of yet. Unable to go [facility].      Hospitalization in the beginning of 5/2023 d/t COPD exacerbation w/hypercapnia - required intubation-received abx and steroids.  Down to 5 cigarettes/day and some days none.  Denies any abnormal bleeding. Does not eat a well balanced diet.      Is giving her self B12 1000 mcg IM monthly.        Social History     Socioeconomic History    Marital status:    Tobacco Use    Smoking status: Every Day     Types: Cigarettes, Vaping w/o nicotine     Passive exposure: Past    Smokeless tobacco: Never   Substance and Sexual Activity    Alcohol use: Not Currently    Drug use: Never    Sexual activity: Not Currently       Family History   Problem Relation Age of Onset    Hypertension Mother     Memory loss Mother     Hypertension Father     Diabetes Father     Cancer Father        Past Surgical History:   Procedure Laterality Date    AUGMENTATION OF BREAST Bilateral     3 times    BREAST SURGERY         Past Medical History:   Diagnosis Date    COPD (chronic obstructive pulmonary disease)     Encounter for blood transfusion     Hyperlipidemia     Hypertension        Review of Systems   Constitutional: Negative.    HENT: Negative.     Eyes: Negative.    Respiratory: Negative.     Cardiovascular: Negative.    Gastrointestinal: Negative.    Endocrine: Negative.    Genitourinary: Negative.    Musculoskeletal:  " Positive for arthralgias and myalgias.        Bilateral breast tenderness with left greater then right  Bilateral inflamed breast tissue with left greater then right   Skin: Negative.    Allergic/Immunologic: Negative.    Neurological: Negative.    Hematological:  Bruises/bleeds easily.        Medication List with Changes/Refills   New Medications    DOXYCYCLINE (VIBRAMYCIN) 100 MG CAP    Take 1 capsule (100 mg total) by mouth every 12 (twelve) hours.    FERROUS SULFATE (FEOSOL) 325 MG (65 MG IRON) TAB TABLET    Take 1 tablet (325 mg total) by mouth once daily.    FLUCONAZOLE (DIFLUCAN) 150 MG TAB    Take 1 tablet (150 mg total) by mouth once. for 1 dose   Current Medications    ALBUTEROL (PROVENTIL/VENTOLIN HFA) 90 MCG/ACTUATION INHALER    Inhale 2 puffs into the lungs every 6 (six) hours as needed.    ALBUTEROL-IPRATROPIUM (DUO-NEB) 2.5 MG-0.5 MG/3 ML NEBULIZER SOLUTION    Take by nebulization every 6 (six) hours as needed.    ALLOPURINOL (ZYLOPRIM) 100 MG TABLET    Take 1 tablet (100 mg total) by mouth once daily.    BUMETANIDE (BUMEX) 1 MG TABLET    Take 0.5 tablets (0.5 mg total) by mouth every other day.    CELECOXIB (CELEBREX) 50 MG CAPSULE    Take 1 capsule (50 mg total) by mouth 2 (two) times daily.    CLONAZEPAM (KLONOPIN) 0.5 MG TABLET    Take 0.25 mg by mouth nightly as needed.    FLUTICASONE PROPIONATE (FLONASE) 50 MCG/ACTUATION NASAL SPRAY    fluticasone propionate 50 mcg/actuation nasal spray,suspension   USE 1 SPRAY IN EACH NOSTRIL DAILY    FOLIC ACID (FOLVITE) 1 MG TABLET    Take 1 tablet (1,000 mcg total) by mouth every morning.    HYDROCODONE-ACETAMINOPHEN (NORCO) 7.5-325 MG PER TABLET    SMARTSI Tablet(s) By Mouth Every 12 Hours PRN    LORATADINE (CLARITIN) 10 MG TABLET    Take 1 tablet (10 mg total) by mouth once daily.    LOSARTAN (COZAAR) 25 MG TABLET    Take 1 tablet (25 mg total) by mouth once daily.    MEDROXYPROGESTERONE (PROVERA) 10 MG TABLET    Take 10 mg by mouth.    METOPROLOL  "SUCCINATE (TOPROL-XL) 25 MG 24 HR TABLET    Take 1 tablet (25 mg total) by mouth once daily.    MIRTAZAPINE (REMERON) 15 MG TABLET    Take 1 tablet (15 mg total) by mouth nightly as needed (sleep).    NALOXONE (NARCAN) 4 MG/ACTUATION SPRY    naloxone 4 mg/actuation nasal spray   SPRAY 1 SPRAY INTO ONE NOSTRIL REPEAT ANOTHER INTO OPPOSITE NOSTRIL IN 2 TO 3 MINUTES UNTIL PT RESPONSIVE OR HELP ARRIVES    PANTOPRAZOLE (PROTONIX) 40 MG TABLET    Take 1 tablet (40 mg total) by mouth once daily.    PRAMIPEXOLE (MIRAPEX) 0.75 MG TABLET    pramipexole 0.75 mg tablet   TAKE 1 TABLET BY MOUTH AT BEDTIME FOR RESTLESS LEGS    PREGABALIN (LYRICA) 50 MG CAPSULE    Take 50 mg by mouth 3 (three) times daily as needed.    ROSUVASTATIN (CRESTOR) 10 MG TABLET    Take 1 tablet (10 mg total) by mouth every evening.    TRELEGY ELLIPTA 100-62.5-25 MCG DSDV    SMARTSI Inhalation Via Inhaler Daily   Changed and/or Refilled Medications    Modified Medication Previous Medication    CYANOCOBALAMIN 1,000 MCG/ML INJECTION cyanocobalamin 1,000 mcg/mL injection       Inject 1 mL (1,000 mcg total) into the skin every 30 days. for 6 doses    Inject 1 mL (1,000 mcg total) into the skin every 30 days. for 6 doses    NEEDLE, DISP, 25 GAUGE 25 GAUGE X 5/8" NDLE needle, disp, 25 gauge 25 gauge x 5/8" Ndle       1 each by Misc.(Non-Drug; Combo Route) route every 30 days. for 6 doses    1 each by Misc.(Non-Drug; Combo Route) route every 30 days. for 6 doses    SYRINGE, DISPOSABLE, 1 ML SYRG syringe, disposable, 1 mL Syrg       1 Box by Misc.(Non-Drug; Combo Route) route every 30 days. for 6 doses    1 Box by Misc.(Non-Drug; Combo Route) route every 30 days. for 6 doses        Objective:     Vitals:    23 1104   BP: 124/78   Pulse: 78       Physical Exam  Vitals reviewed.   Constitutional:       Appearance: Normal appearance.   HENT:      Head: Normocephalic and atraumatic.      Right Ear: External ear normal.      Left Ear: External ear normal. " "  Cardiovascular:      Rate and Rhythm: Normal rate and regular rhythm.      Heart sounds: Normal heart sounds, S1 normal and S2 normal.   Pulmonary:      Effort: Pulmonary effort is normal.      Breath sounds: Decreased air movement present.   Chest:   Breasts:     Right: Swelling and tenderness present.      Left: Swelling and tenderness present.   Abdominal:      General: There is no distension.   Musculoskeletal:         General: Normal range of motion.      Cervical back: Normal range of motion.   Skin:     General: Skin is warm and dry.   Neurological:      General: No focal deficit present.      Mental Status: She is alert and oriented to person, place, and time.   Psychiatric:         Attention and Perception: Attention and perception normal.         Mood and Affect: Mood and affect normal.         Speech: Speech normal.         Behavior: Behavior normal. Behavior is cooperative.         Thought Content: Thought content normal.         Cognition and Memory: Cognition and memory normal.         Judgment: Judgment normal.       Assessment:     Problem List Items Addressed This Visit    None  Visit Diagnoses       Normocytic anemia    -  Primary    Relevant Orders    CBC Auto Differential    Comprehensive Metabolic Panel    Ferritin    Iron and TIBC    Iron deficiency anemia, unspecified iron deficiency anemia type        Relevant Medications    ferrous sulfate (FEOSOL) 325 mg (65 mg iron) Tab tablet    Other Relevant Orders    CBC Auto Differential    Comprehensive Metabolic Panel    Ferritin    Iron and TIBC    Anemia due to vitamin B12 deficiency, unspecified B12 deficiency type        Relevant Medications    cyanocobalamin 1,000 mcg/mL injection    needle, disp, 25 gauge 25 gauge x 5/8" Ndle    syringe, disposable, 1 mL Syrg    Mastitis in female        Relevant Medications    doxycycline (VIBRAMYCIN) 100 MG Cap    fluconazole (DIFLUCAN) 150 MG Tab    Colon cancer screening        Relevant Orders    " Cologuard Screening (Multitarget Stool DNA)            Lab Results   Component Value Date    WBC 6.76 05/23/2023    WBC 6.76 05/23/2023    RBC 3.41 (L) 05/23/2023    RBC 3.41 (L) 05/23/2023    HGB 10.3 (L) 05/23/2023    HGB 10.3 (L) 05/23/2023    HCT 32.7 (L) 05/23/2023    HCT 32.7 (L) 05/23/2023    MCV 96 05/23/2023    MCV 96 05/23/2023    MCH 30.2 05/23/2023    MCH 30.2 05/23/2023    MCHC 31.5 (L) 05/23/2023    MCHC 31.5 (L) 05/23/2023    RDW 14.6 (H) 05/23/2023    RDW 14.6 (H) 05/23/2023     05/23/2023     05/23/2023    MPV 11.5 05/23/2023    MPV 11.5 05/23/2023    GRAN 5.0 05/23/2023    GRAN 73.4 (H) 05/23/2023    GRAN 5.0 05/23/2023    GRAN 73.4 (H) 05/23/2023    LYMPH 1.0 05/23/2023    LYMPH 14.2 (L) 05/23/2023    LYMPH 1.0 05/23/2023    LYMPH 14.2 (L) 05/23/2023    MONO 0.7 05/23/2023    MONO 10.1 05/23/2023    MONO 0.7 05/23/2023    MONO 10.1 05/23/2023    EOS 0.1 05/23/2023    EOS 0.1 05/23/2023    BASO 0.01 05/23/2023    BASO 0.01 05/23/2023    EOSINOPHIL 1.6 05/23/2023    EOSINOPHIL 1.6 05/23/2023    BASOPHIL 0.1 05/23/2023    BASOPHIL 0.1 05/23/2023      Lab Results   Component Value Date     05/23/2023     05/23/2023    K 5.0 05/23/2023    K 5.0 05/23/2023    CL 98 05/23/2023    CL 98 05/23/2023    CO2 28 05/23/2023    CO2 28 05/23/2023    BUN 33 (H) 05/23/2023    BUN 33 (H) 05/23/2023    CREATININE 1.8 (H) 05/23/2023    CREATININE 1.8 (H) 05/23/2023    CALCIUM 9.2 05/23/2023    CALCIUM 9.2 05/23/2023    ANIONGAP 10 05/23/2023    ANIONGAP 10 05/23/2023     Lab Results   Component Value Date    ALT 13 05/23/2023    ALT 13 05/23/2023    AST 16 05/23/2023    AST 16 05/23/2023    ALKPHOS 82 05/23/2023    ALKPHOS 82 05/23/2023    BILITOT 0.3 05/23/2023    BILITOT 0.3 05/23/2023     Lab Results   Component Value Date    IRON 14 (L) 05/23/2023    TRANSFERRIN 208 05/23/2023    TIBC 308 05/23/2023    FESATURATED 5 (L) 05/23/2023      Lab Results   Component Value Date    FERRITIN 459  "(H) 05/23/2023         Plan:   Normocytic anemia  -     CBC Auto Differential; Future; Expected date: 05/25/2023  -     Comprehensive Metabolic Panel; Future; Expected date: 05/25/2023  -     Ferritin; Future; Expected date: 05/25/2023  -     Iron and TIBC; Future; Expected date: 05/25/2023    Iron deficiency anemia, unspecified iron deficiency anemia type  -     ferrous sulfate (FEOSOL) 325 mg (65 mg iron) Tab tablet; Take 1 tablet (325 mg total) by mouth once daily.  Dispense: 90 tablet; Refill: 0  -     CBC Auto Differential; Future; Expected date: 05/25/2023  -     Comprehensive Metabolic Panel; Future; Expected date: 05/25/2023  -     Ferritin; Future; Expected date: 05/25/2023  -     Iron and TIBC; Future; Expected date: 05/25/2023    Anemia due to vitamin B12 deficiency, unspecified B12 deficiency type  -     cyanocobalamin 1,000 mcg/mL injection; Inject 1 mL (1,000 mcg total) into the skin every 30 days. for 6 doses  Dispense: 6 mL; Refill: 0  -     needle, disp, 25 gauge 25 gauge x 5/8" Ndle; 1 each by Misc.(Non-Drug; Combo Route) route every 30 days. for 6 doses  Dispense: 25 each; Refill: 0  -     syringe, disposable, 1 mL Syrg; 1 Box by Misc.(Non-Drug; Combo Route) route every 30 days. for 6 doses  Dispense: 25 each; Refill: 0    Mastitis in female  -     doxycycline (VIBRAMYCIN) 100 MG Cap; Take 1 capsule (100 mg total) by mouth every 12 (twelve) hours.  Dispense: 20 capsule; Refill: 0  -     fluconazole (DIFLUCAN) 150 MG Tab; Take 1 tablet (150 mg total) by mouth once. for 1 dose  Dispense: 1 tablet; Refill: 0    Colon cancer screening  -     Cologuard Screening (Multitarget Stool DNA); Future; Expected date: 05/25/2023    Patient will see her outside GI doc for possible liver cirrhosis as well as evaluation with upper GI d/t recurrent ANA MARIA.  Start ferrous sulfate 325 mg Po daily with stool softeners as needed.  Repeat Cologuard testing.  Repeat testing kit ordered.  Continue B12 1000 mcg IM monthly or " deep subq.  F/u in 3 months with repeat labs and f/u in person in visit.  Complete doxycyline 100 mg PO bid for treatment of mastitis in bilateral breast. She will let me know if breast pain does not continue to reduce over the next week.    Collaborating Provider:  Dr. Jose Beltran    Thank You,  Sundar Guzman, JAYSONP-C  Hematology Oncology

## 2023-05-31 ENCOUNTER — PATIENT MESSAGE (OUTPATIENT)
Dept: RESEARCH | Facility: HOSPITAL | Age: 68
End: 2023-05-31
Payer: MEDICARE

## 2023-06-05 ENCOUNTER — OFFICE VISIT (OUTPATIENT)
Dept: PRIMARY CARE CLINIC | Facility: CLINIC | Age: 68
End: 2023-06-05
Payer: MEDICARE

## 2023-06-05 VITALS
HEIGHT: 62 IN | BODY MASS INDEX: 21.99 KG/M2 | DIASTOLIC BLOOD PRESSURE: 82 MMHG | SYSTOLIC BLOOD PRESSURE: 120 MMHG | TEMPERATURE: 98 F | OXYGEN SATURATION: 90 % | HEART RATE: 80 BPM | WEIGHT: 119.5 LBS

## 2023-06-05 DIAGNOSIS — I50.32 CHRONIC DIASTOLIC CONGESTIVE HEART FAILURE: ICD-10-CM

## 2023-06-05 DIAGNOSIS — K21.9 GASTROESOPHAGEAL REFLUX DISEASE WITHOUT ESOPHAGITIS: ICD-10-CM

## 2023-06-05 DIAGNOSIS — Z12.11 COLON CANCER SCREENING: ICD-10-CM

## 2023-06-05 DIAGNOSIS — G62.9 NEUROPATHY: ICD-10-CM

## 2023-06-05 DIAGNOSIS — G47.9 SLEEP DIFFICULTIES: ICD-10-CM

## 2023-06-05 DIAGNOSIS — F41.9 ANXIETY: ICD-10-CM

## 2023-06-05 DIAGNOSIS — I10 PRIMARY HYPERTENSION: Primary | ICD-10-CM

## 2023-06-05 DIAGNOSIS — E78.5 HYPERLIPIDEMIA, UNSPECIFIED HYPERLIPIDEMIA TYPE: ICD-10-CM

## 2023-06-05 DIAGNOSIS — J44.9 CHRONIC OBSTRUCTIVE PULMONARY DISEASE, UNSPECIFIED COPD TYPE: ICD-10-CM

## 2023-06-05 DIAGNOSIS — N18.31 STAGE 3A CHRONIC KIDNEY DISEASE: ICD-10-CM

## 2023-06-05 PROCEDURE — 99999 PR PBB SHADOW E&M-EST. PATIENT-LVL V: CPT | Mod: PBBFAC,,, | Performed by: FAMILY MEDICINE

## 2023-06-05 PROCEDURE — 3079F PR MOST RECENT DIASTOLIC BLOOD PRESSURE 80-89 MM HG: ICD-10-PCS | Mod: CPTII,S$GLB,, | Performed by: FAMILY MEDICINE

## 2023-06-05 PROCEDURE — 1160F PR REVIEW ALL MEDS BY PRESCRIBER/CLIN PHARMACIST DOCUMENTED: ICD-10-PCS | Mod: CPTII,S$GLB,, | Performed by: FAMILY MEDICINE

## 2023-06-05 PROCEDURE — 4010F PR ACE/ARB THEARPY RXD/TAKEN: ICD-10-PCS | Mod: CPTII,S$GLB,, | Performed by: FAMILY MEDICINE

## 2023-06-05 PROCEDURE — 1160F RVW MEDS BY RX/DR IN RCRD: CPT | Mod: CPTII,S$GLB,, | Performed by: FAMILY MEDICINE

## 2023-06-05 PROCEDURE — 99215 OFFICE O/P EST HI 40 MIN: CPT | Mod: S$GLB,,, | Performed by: FAMILY MEDICINE

## 2023-06-05 PROCEDURE — 3288F FALL RISK ASSESSMENT DOCD: CPT | Mod: CPTII,S$GLB,, | Performed by: FAMILY MEDICINE

## 2023-06-05 PROCEDURE — 3008F PR BODY MASS INDEX (BMI) DOCUMENTED: ICD-10-PCS | Mod: CPTII,S$GLB,, | Performed by: FAMILY MEDICINE

## 2023-06-05 PROCEDURE — 1101F PR PT FALLS ASSESS DOC 0-1 FALLS W/OUT INJ PAST YR: ICD-10-PCS | Mod: CPTII,S$GLB,, | Performed by: FAMILY MEDICINE

## 2023-06-05 PROCEDURE — 3079F DIAST BP 80-89 MM HG: CPT | Mod: CPTII,S$GLB,, | Performed by: FAMILY MEDICINE

## 2023-06-05 PROCEDURE — 1126F AMNT PAIN NOTED NONE PRSNT: CPT | Mod: CPTII,S$GLB,, | Performed by: FAMILY MEDICINE

## 2023-06-05 PROCEDURE — 99999 PR PBB SHADOW E&M-EST. PATIENT-LVL V: ICD-10-PCS | Mod: PBBFAC,,, | Performed by: FAMILY MEDICINE

## 2023-06-05 PROCEDURE — 3074F SYST BP LT 130 MM HG: CPT | Mod: CPTII,S$GLB,, | Performed by: FAMILY MEDICINE

## 2023-06-05 PROCEDURE — 1126F PR PAIN SEVERITY QUANTIFIED, NO PAIN PRESENT: ICD-10-PCS | Mod: CPTII,S$GLB,, | Performed by: FAMILY MEDICINE

## 2023-06-05 PROCEDURE — 4010F ACE/ARB THERAPY RXD/TAKEN: CPT | Mod: CPTII,S$GLB,, | Performed by: FAMILY MEDICINE

## 2023-06-05 PROCEDURE — 1159F PR MEDICATION LIST DOCUMENTED IN MEDICAL RECORD: ICD-10-PCS | Mod: CPTII,S$GLB,, | Performed by: FAMILY MEDICINE

## 2023-06-05 PROCEDURE — 3288F PR FALLS RISK ASSESSMENT DOCUMENTED: ICD-10-PCS | Mod: CPTII,S$GLB,, | Performed by: FAMILY MEDICINE

## 2023-06-05 PROCEDURE — 99215 PR OFFICE/OUTPT VISIT, EST, LEVL V, 40-54 MIN: ICD-10-PCS | Mod: S$GLB,,, | Performed by: FAMILY MEDICINE

## 2023-06-05 PROCEDURE — 1159F MED LIST DOCD IN RCRD: CPT | Mod: CPTII,S$GLB,, | Performed by: FAMILY MEDICINE

## 2023-06-05 PROCEDURE — 3008F BODY MASS INDEX DOCD: CPT | Mod: CPTII,S$GLB,, | Performed by: FAMILY MEDICINE

## 2023-06-05 PROCEDURE — 3074F PR MOST RECENT SYSTOLIC BLOOD PRESSURE < 130 MM HG: ICD-10-PCS | Mod: CPTII,S$GLB,, | Performed by: FAMILY MEDICINE

## 2023-06-05 PROCEDURE — 1101F PT FALLS ASSESS-DOCD LE1/YR: CPT | Mod: CPTII,S$GLB,, | Performed by: FAMILY MEDICINE

## 2023-06-05 RX ORDER — FOLIC ACID 1 MG/1
1000 TABLET ORAL EVERY MORNING
Qty: 90 TABLET | Refills: 3 | Status: SHIPPED | OUTPATIENT
Start: 2023-06-05 | End: 2024-06-04

## 2023-06-05 RX ORDER — LOSARTAN POTASSIUM 25 MG/1
25 TABLET ORAL DAILY
Qty: 90 TABLET | Refills: 3 | Status: SHIPPED | OUTPATIENT
Start: 2023-06-05

## 2023-06-05 RX ORDER — CETIRIZINE HYDROCHLORIDE 10 MG/1
10 TABLET ORAL DAILY
Qty: 90 TABLET | Refills: 3 | Status: SHIPPED | OUTPATIENT
Start: 2023-06-05 | End: 2024-06-04

## 2023-06-05 RX ORDER — BUMETANIDE 1 MG/1
0.5 TABLET ORAL EVERY OTHER DAY
Qty: 45 TABLET | Refills: 3 | Status: SHIPPED | OUTPATIENT
Start: 2023-06-05

## 2023-06-05 RX ORDER — MIRTAZAPINE 15 MG/1
15 TABLET, FILM COATED ORAL NIGHTLY PRN
Qty: 90 TABLET | Refills: 3 | Status: SHIPPED | OUTPATIENT
Start: 2023-06-05

## 2023-06-05 RX ORDER — ROSUVASTATIN CALCIUM 10 MG/1
10 TABLET, COATED ORAL NIGHTLY
Qty: 90 TABLET | Refills: 3 | Status: SHIPPED | OUTPATIENT
Start: 2023-06-05

## 2023-06-05 RX ORDER — ALLOPURINOL 100 MG/1
100 TABLET ORAL DAILY
Qty: 90 TABLET | Refills: 3 | Status: SHIPPED | OUTPATIENT
Start: 2023-06-05

## 2023-06-05 RX ORDER — METOPROLOL SUCCINATE 25 MG/1
25 TABLET, EXTENDED RELEASE ORAL DAILY
Qty: 90 TABLET | Refills: 3 | Status: SHIPPED | OUTPATIENT
Start: 2023-06-05

## 2023-06-05 RX ORDER — PANTOPRAZOLE SODIUM 40 MG/1
40 TABLET, DELAYED RELEASE ORAL DAILY
Qty: 90 TABLET | Refills: 3 | Status: SHIPPED | OUTPATIENT
Start: 2023-06-05

## 2023-06-05 NOTE — PATIENT INSTRUCTIONS
Physically, everything looks pretty good today.      Will send refills of your medications to the pharmacy this afternoon.  Will send prescription for Zyrtec, as well.    Continue to eat a healthy diet.  Be careful with portion sizes.  Includes lots of fresh fruits, vegetables, whole grains, lean proteins.  See info below.    Keep hydrated.  Be sure to drink at least 8-10, 8 oz, glasses of water every day.    Stay active.  Try to do some sort of physical activity every day.  Nothing outrageous, just try walking for 10-15 minutes each day.     Will contact [employer] to see about getting a new kit sent out to you.  If you do not receive it within the next couple of days, feel free to call them directly at [phone].  They have 24/7 telephone support available.

## 2023-06-05 NOTE — PROGRESS NOTES
"    [facility]       [address] [place] [address]      Phone: [phone]      Fax: [phone]    Brock Ortiz MD                Office Visit  06/05/2023        Subjective      HPI:  Jasmina Sharma is a 68 y.o. female presents today in clinic for "Follow-up  ."     67-year-old female presents today to checkup on multiple issues.      Patient states she is doing okay today.  A bit tired, but otherwise okay.  No chest pain, shortness a breath.  No fever, chills, body aches.  No coughing, sneezing, URI type symptoms.  Bowel movements are normal.  No urinary issues.    Received the Cologuard box in the mail.  Try to complete the kit, but the sample could not be processed.  Has not received a replacement kit yet.    Has hypertension.  Currently takes losartan 25 mg daily.  No issues with this medication.      Has CHF.  Uses Bumex every other day for leg swelling.  Takes Toprol-XL 25 mg daily.    Has COPD.  Uses Trelegy inhaler daily.  Also uses albuterol inhaler, as needed.  Has nebulizer at home to do DuoNebs.  Supplements with Zyrtec daily.  Follows regularly with her pulmonologist, Dr. Sun.  States that she sees him every month.  Has an appointment with [facility] next week just to get 2nd opinion.  Dr. Sun writes her Klonopin.    Has chronic anxiety.  Takes 1/2 tablet of Klonopin, twice a day.  Was taking Celexa 40 mg daily, but this was discontinued due to her declining kidney function.    Has elevated cholesterol levels.  Takes Crestor 10 mg daily for this.    Occasionally has sleep issues.  Uses Remeron 15 mg, nightly, to help with sleep.    Occasional episodes of GERD.  Uses Protonix 40 mg daily.    Occasionally has issues with restless legs.  Uses Mirapex, as needed.    Has chronic neuropathy.  Follows with Pain Management, Dr. BRIAN Dominguez.  He has her on Lyrica.  Also prescribes her Norco, Celebrex.    Had issues with alcohol abuse " in the past.  Takes folic acid 1 mg every morning.    PMH: HTN, HLD, CAD, CHF, COPD, chronic neuropathy.    PSH: Multiple cosmetic procedures.    F MH: CAD, DM, lung cancer, liver cancer   Allergies:  Amoxicillin causes itching, rash.  Scopolamine causes itching, rash.  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  Lives alone.    T: Denies current use.  Quit in , when went into the hospital.  Previously, two packs per day x 40+ years  A:  Previously daily.  Quit drinking.    D:  Denies     Exercise:  No regular exercise program.  Occasionally gardens.  Gets short of breath with activity.      Pain management:  Dr. BRIAN Dominguez   Pulmonology:  Dr. Sun   Cardiology:  Dr. Taylor     [id number]   Colon: None      The following were updated and reviewed by myself in the chart: medications, past medical history, past surgical history, family history, social history, and allergies.     Medications:  Current Outpatient Medications on File Prior to Visit   Medication Sig Dispense Refill    albuterol-ipratropium (DUO-NEB) 2.5 mg-0.5 mg/3 mL nebulizer solution Take by nebulization every 6 (six) hours as needed.      celecoxib (CELEBREX) 50 MG capsule Take 1 capsule (50 mg total) by mouth 2 (two) times daily. 60 capsule 0    clonazePAM (KLONOPIN) 0.5 MG tablet Take 0.25 mg by mouth nightly as needed.      cyanocobalamin 1,000 mcg/mL injection Inject 1 mL (1,000 mcg total) into the skin every 30 days. for 6 doses 6 mL 0    ferrous sulfate (FEOSOL) 325 mg (65 mg iron) Tab tablet Take 1 tablet (325 mg total) by mouth once daily. 90 tablet 0    fluticasone propionate (FLONASE) 50 mcg/actuation nasal spray fluticasone propionate 50 mcg/actuation nasal spray,suspension   USE 1 SPRAY IN EACH NOSTRIL DAILY      HYDROcodone-acetaminophen (NORCO) 7.5-325 mg per tablet SMARTSI Tablet(s) By Mouth Every 12 Hours PRN      medroxyPROGESTERone (PROVERA) 10 MG tablet Take 10 mg by mouth.      naloxone (NARCAN) 4  "mg/actuation Spry naloxone 4 mg/actuation nasal spray   SPRAY 1 SPRAY INTO ONE NOSTRIL REPEAT ANOTHER INTO OPPOSITE NOSTRIL IN 2 TO 3 MINUTES UNTIL PT RESPONSIVE OR HELP ARRIVES      needle, disp, 25 gauge 25 gauge x 5/8" Ndle 1 each by Misc.(Non-Drug; Combo Route) route every 30 days. for 6 doses 25 each 0    pramipexole (MIRAPEX) 0.75 MG tablet pramipexole 0.75 mg tablet   TAKE 1 TABLET BY MOUTH AT BEDTIME FOR RESTLESS LEGS      pregabalin (LYRICA) 50 MG capsule Take 50 mg by mouth 3 (three) times daily as needed.      syringe, disposable, 1 mL Syrg 1 Box by Misc.(Non-Drug; Combo Route) route every 30 days. for 6 doses 25 each 0    TRELEGY ELLIPTA 100-62.5-25 mcg DsDv SMARTSI Inhalation Via Inhaler Daily      [DISCONTINUED] allopurinoL (ZYLOPRIM) 100 MG tablet Take 1 tablet (100 mg total) by mouth once daily. 90 tablet 3    [DISCONTINUED] bumetanide (BUMEX) 1 MG tablet Take 0.5 tablets (0.5 mg total) by mouth every other day. 45 tablet 3    [DISCONTINUED] folic acid (FOLVITE) 1 MG tablet Take 1 tablet (1,000 mcg total) by mouth every morning. 90 tablet 3    [DISCONTINUED] loratadine (CLARITIN) 10 mg tablet Take 1 tablet (10 mg total) by mouth once daily. 90 tablet 3    [DISCONTINUED] losartan (COZAAR) 25 MG tablet Take 1 tablet (25 mg total) by mouth once daily. 90 tablet 3    [DISCONTINUED] metoprolol succinate (TOPROL-XL) 25 MG 24 hr tablet Take 1 tablet (25 mg total) by mouth once daily. 90 tablet 3    [DISCONTINUED] mirtazapine (REMERON) 15 MG tablet Take 1 tablet (15 mg total) by mouth nightly as needed (sleep). 90 tablet 3    [DISCONTINUED] pantoprazole (PROTONIX) 40 MG tablet Take 1 tablet (40 mg total) by mouth once daily. 90 tablet 3    [DISCONTINUED] rosuvastatin (CRESTOR) 10 MG tablet Take 1 tablet (10 mg total) by mouth every evening. 90 tablet 3    albuterol (PROVENTIL/VENTOLIN HFA) 90 mcg/actuation inhaler Inhale 2 puffs into the lungs every 6 (six) hours as needed.      [DISCONTINUED] doxycycline " (VIBRAMYCIN) 100 MG Cap Take 1 capsule (100 mg total) by mouth every 12 (twelve) hours. (Patient not taking: Reported on 6/5/2023) 20 capsule 0     Current Facility-Administered Medications on File Prior to Visit   Medication Dose Route Frequency Provider Last Rate Last Admin    [DISCONTINUED] GENERIC EXTERNAL MEDICATION     Generic External Data Provider        [DISCONTINUED] GENERIC EXTERNAL MEDICATION     Generic External Data Provider            PMHx:  Past Medical History:   Diagnosis Date    COPD (chronic obstructive pulmonary disease)     Encounter for blood transfusion     Hyperlipidemia     Hypertension       Patient Active Problem List    Diagnosis Date Noted    Hepatic cirrhosis, unspecified hepatic cirrhosis type 02/16/2023    Stage 3a chronic kidney disease 02/16/2023    CAD (coronary artery disease) 10/27/2022    Acute on chronic diastolic heart failure 04/03/2022    Hyperlipidemia 01/28/2019    Severe chronic obstructive pulmonary disease 08/27/2018    Essential hypertension 05/02/2016        PSHx:  Past Surgical History:   Procedure Laterality Date    AUGMENTATION OF BREAST Bilateral     3 times    BREAST SURGERY          FHx:  Family History   Problem Relation Age of Onset    Hypertension Mother     Memory loss Mother     Hypertension Father     Diabetes Father     Cancer Father         Social:  Social History     Socioeconomic History    Marital status:    Tobacco Use    Smoking status: Every Day     Types: Cigarettes, Vaping w/o nicotine     Passive exposure: Past    Smokeless tobacco: Never   Substance and Sexual Activity    Alcohol use: Not Currently    Drug use: Never    Sexual activity: Not Currently        Allergies:  Review of patient's allergies indicates:   Allergen Reactions    Amoxicillin Hives, Itching and Rash    Scopolamine Itching        ROS:  Review of Systems   Constitutional:  Negative for activity change, appetite change, chills and fever.   HENT:  Negative for  "congestion, postnasal drip, rhinorrhea, sore throat and trouble swallowing.    Respiratory:  Negative for cough, shortness of breath and wheezing.    Cardiovascular:  Negative for chest pain and palpitations.   Gastrointestinal:  Negative for abdominal pain, constipation, diarrhea, nausea and vomiting.   Genitourinary:  Negative for difficulty urinating.   Musculoskeletal:  Positive for arthralgias and myalgias.   Skin:  Negative for color change and rash.   Neurological:  Negative for speech difficulty and headaches.   All other systems reviewed and are negative.       Objective      /82   Pulse 80   Temp 98.2 °F (36.8 °C)   Ht 5' 2" (1.575 m)   Wt 54.2 kg (119 lb 7.8 oz)   SpO2 (!) 90%   BMI 21.85 kg/m²   Ht Readings from Last 3 Encounters:   06/05/23 5' 2" (1.575 m)   05/25/23 5' 2" (1.575 m)   03/16/23 5' 2" (1.575 m)     Wt Readings from Last 3 Encounters:   06/05/23 54.2 kg (119 lb 7.8 oz)   05/25/23 54.5 kg (120 lb 2.4 oz)   03/16/23 48.6 kg (107 lb 2.3 oz)       PHYSICAL EXAM:  Physical Exam  Vitals and nursing note reviewed.   Constitutional:       General: She is not in acute distress.     Appearance: Normal appearance.   HENT:      Head: Normocephalic and atraumatic.      Right Ear: Tympanic membrane, ear canal and external ear normal.      Left Ear: Tympanic membrane, ear canal and external ear normal.      Nose: Nose normal. No congestion or rhinorrhea.      Mouth/Throat:      Mouth: Mucous membranes are moist.      Pharynx: Oropharynx is clear. No oropharyngeal exudate or posterior oropharyngeal erythema.   Eyes:      Extraocular Movements: Extraocular movements intact.      Conjunctiva/sclera: Conjunctivae normal.      Pupils: Pupils are equal, round, and reactive to light.   Cardiovascular:      Rate and Rhythm: Normal rate and regular rhythm.   Pulmonary:      Effort: Pulmonary effort is normal. No respiratory distress.      Breath sounds: No wheezing, rhonchi or rales. "   Musculoskeletal:         General: Normal range of motion.      Cervical back: Normal range of motion.   Lymphadenopathy:      Cervical: No cervical adenopathy.   Skin:     General: Skin is warm and dry.      Findings: No rash.   Neurological:      Mental Status: She is alert.            LABS / IMAGING:  Recent Results (from the past 4368 hour(s))   CBC Auto Differential    Collection Time: 12/09/22  1:30 PM   Result Value Ref Range    WBC 6.25 3.90 - 12.70 K/uL    RBC 3.40 (L) 4.00 - 5.40 M/uL    Hemoglobin 10.4 (L) 12.0 - 16.0 g/dL    Hematocrit 32.7 (L) 37.0 - 48.5 %    MCV 96 82 - 98 fL    MCH 30.6 27.0 - 31.0 pg    MCHC 31.8 (L) 32.0 - 36.0 g/dL    RDW 13.5 11.5 - 14.5 %    Platelets 243 150 - 450 K/uL    MPV 10.7 9.2 - 12.9 fL    Immature Granulocytes 0.2 0.0 - 0.5 %    Gran # (ANC) 4.1 1.8 - 7.7 K/uL    Immature Grans (Abs) 0.01 0.00 - 0.04 K/uL    Lymph # 1.6 1.0 - 4.8 K/uL    Mono # 0.6 0.3 - 1.0 K/uL    Eos # 0.1 0.0 - 0.5 K/uL    Baso # 0.03 0.00 - 0.20 K/uL    nRBC 0 0 /100 WBC    Gran % 64.7 38.0 - 73.0 %    Lymph % 24.8 18.0 - 48.0 %    Mono % 8.8 4.0 - 15.0 %    Eosinophil % 1.0 0.0 - 8.0 %    Basophil % 0.5 0.0 - 1.9 %    Differential Method Automated    Comprehensive Metabolic Panel    Collection Time: 12/09/22  1:30 PM   Result Value Ref Range    Sodium 134 (L) 136 - 145 mmol/L    Potassium 4.2 3.5 - 5.1 mmol/L    Chloride 94 (L) 95 - 110 mmol/L    CO2 30 (H) 23 - 29 mmol/L    Glucose 110 70 - 110 mg/dL    BUN 15 8 - 23 mg/dL    Creatinine 1.2 0.5 - 1.4 mg/dL    Calcium 10.1 8.7 - 10.5 mg/dL    Total Protein 6.9 6.0 - 8.4 g/dL    Albumin 3.6 3.5 - 5.2 g/dL    Total Bilirubin 0.3 0.1 - 1.0 mg/dL    Alkaline Phosphatase 68 55 - 135 U/L    AST 18 10 - 40 U/L    ALT 8 (L) 10 - 44 U/L    Anion Gap 10 8 - 16 mmol/L    eGFR 50 (A) >60 mL/min/1.73 m^2   RENAL FUNCTION PANEL    Collection Time: 12/09/22  1:30 PM   Result Value Ref Range    Glucose 110 70 - 110 mg/dL    Sodium 134 (L) 136 - 145 mmol/L     Potassium 4.2 3.5 - 5.1 mmol/L    Chloride 94 (L) 95 - 110 mmol/L    CO2 30 (H) 23 - 29 mmol/L    BUN 15 8 - 23 mg/dL    Calcium 10.1 8.7 - 10.5 mg/dL    Creatinine 1.2 0.5 - 1.4 mg/dL    Albumin 3.6 3.5 - 5.2 g/dL    Phosphorus 3.1 2.7 - 4.5 mg/dL    eGFR 50 (A) >60 mL/min/1.73 m^2    Anion Gap 10 8 - 16 mmol/L   CBC Auto Differential    Collection Time: 01/06/23  2:30 PM   Result Value Ref Range    WBC 3.49 (L) 3.90 - 12.70 K/uL    RBC 3.69 (L) 4.00 - 5.40 M/uL    Hemoglobin 11.0 (L) 12.0 - 16.0 g/dL    Hematocrit 36.0 (L) 37.0 - 48.5 %    MCV 98 82 - 98 fL    MCH 29.8 27.0 - 31.0 pg    MCHC 30.6 (L) 32.0 - 36.0 g/dL    RDW 13.7 11.5 - 14.5 %    Platelets 178 150 - 450 K/uL    MPV 11.5 9.2 - 12.9 fL    Immature Granulocytes 0.3 0.0 - 0.5 %    Gran # (ANC) 1.8 1.8 - 7.7 K/uL    Immature Grans (Abs) 0.01 0.00 - 0.04 K/uL    Lymph # 1.1 1.0 - 4.8 K/uL    Mono # 0.4 0.3 - 1.0 K/uL    Eos # 0.1 0.0 - 0.5 K/uL    Baso # 0.01 0.00 - 0.20 K/uL    nRBC 0 0 /100 WBC    Gran % 50.7 38.0 - 73.0 %    Lymph % 32.4 18.0 - 48.0 %    Mono % 12.3 4.0 - 15.0 %    Eosinophil % 4.0 0.0 - 8.0 %    Basophil % 0.3 0.0 - 1.9 %    Differential Method Automated    CBC Auto Differential    Collection Time: 02/14/23  2:14 PM   Result Value Ref Range    WBC 12.04 3.90 - 12.70 K/uL    RBC 3.49 (L) 4.00 - 5.40 M/uL    Hemoglobin 10.5 (L) 12.0 - 16.0 g/dL    Hematocrit 33.2 (L) 37.0 - 48.5 %    MCV 95 82 - 98 fL    MCH 30.1 27.0 - 31.0 pg    MCHC 31.6 (L) 32.0 - 36.0 g/dL    RDW 15.6 (H) 11.5 - 14.5 %    Platelets 244 150 - 450 K/uL    MPV 11.2 9.2 - 12.9 fL    Immature Granulocytes 0.2 0.0 - 0.5 %    Gran # (ANC) 8.0 (H) 1.8 - 7.7 K/uL    Immature Grans (Abs) 0.03 0.00 - 0.04 K/uL    Lymph # 3.1 1.0 - 4.8 K/uL    Mono # 0.8 0.3 - 1.0 K/uL    Eos # 0.1 0.0 - 0.5 K/uL    Baso # 0.02 0.00 - 0.20 K/uL    nRBC 0 0 /100 WBC    Gran % 66.8 38.0 - 73.0 %    Lymph % 25.4 18.0 - 48.0 %    Mono % 6.5 4.0 - 15.0 %    Eosinophil % 0.9 0.0 - 8.0 %    Basophil  % 0.2 0.0 - 1.9 %    Differential Method Automated    Comprehensive Metabolic Panel    Collection Time: 02/14/23  2:14 PM   Result Value Ref Range    Sodium 139 136 - 145 mmol/L    Potassium 4.3 3.5 - 5.1 mmol/L    Chloride 101 95 - 110 mmol/L    CO2 26 23 - 29 mmol/L    Glucose 86 70 - 110 mg/dL    BUN 32 (H) 8 - 23 mg/dL    Creatinine 1.5 (H) 0.5 - 1.4 mg/dL    Calcium 9.0 8.7 - 10.5 mg/dL    Total Protein 6.9 6.0 - 8.4 g/dL    Albumin 3.6 3.5 - 5.2 g/dL    Total Bilirubin 0.2 0.1 - 1.0 mg/dL    Alkaline Phosphatase 79 55 - 135 U/L    AST 18 10 - 40 U/L    ALT 11 10 - 44 U/L    Anion Gap 12 8 - 16 mmol/L    eGFR 38 (A) >60 mL/min/1.73 m^2   Urinalysis, Reflex to Urine Culture Urine, Clean Catch    Collection Time: 02/22/23  2:40 PM    Specimen: Urine   Result Value Ref Range    Specimen UA Urine, Clean Catch     Color, UA Yellow Yellow, Straw, Shalini    Appearance, UA Clear Clear    pH, UA 6.0 5.0 - 8.0    Specific Gravity, UA 1.010 1.005 - 1.030    Protein, UA Negative Negative    Glucose, UA Negative Negative    Ketones, UA Negative Negative    Bilirubin (UA) Negative Negative    Occult Blood UA Negative Negative    Nitrite, UA Negative Negative    Leukocytes, UA Negative Negative   CBC Auto Differential    Collection Time: 03/06/23  1:28 PM   Result Value Ref Range    WBC 7.16 3.90 - 12.70 K/uL    RBC 3.43 (L) 4.00 - 5.40 M/uL    Hemoglobin 10.3 (L) 12.0 - 16.0 g/dL    Hematocrit 32.3 (L) 37.0 - 48.5 %    MCV 94 82 - 98 fL    MCH 30.0 27.0 - 31.0 pg    MCHC 31.9 (L) 32.0 - 36.0 g/dL    RDW 15.9 (H) 11.5 - 14.5 %    Platelets 281 150 - 450 K/uL    MPV 10.9 9.2 - 12.9 fL    Immature Granulocytes 0.4 0.0 - 0.5 %    Gran # (ANC) 4.6 1.8 - 7.7 K/uL    Immature Grans (Abs) 0.03 0.00 - 0.04 K/uL    Lymph # 1.8 1.0 - 4.8 K/uL    Mono # 0.6 0.3 - 1.0 K/uL    Eos # 0.2 0.0 - 0.5 K/uL    Baso # 0.01 0.00 - 0.20 K/uL    nRBC 0 0 /100 WBC    Gran % 64.0 38.0 - 73.0 %    Lymph % 25.0 18.0 - 48.0 %    Mono % 8.0 4.0 - 15.0 %     Eosinophil % 2.5 0.0 - 8.0 %    Basophil % 0.1 0.0 - 1.9 %    Differential Method Automated    Comprehensive Metabolic Panel    Collection Time: 03/06/23  1:28 PM   Result Value Ref Range    Sodium 137 136 - 145 mmol/L    Potassium 4.0 3.5 - 5.1 mmol/L    Chloride 99 95 - 110 mmol/L    CO2 25 23 - 29 mmol/L    Glucose 118 (H) 70 - 110 mg/dL    BUN 16 8 - 23 mg/dL    Creatinine 1.2 0.5 - 1.4 mg/dL    Calcium 8.9 8.7 - 10.5 mg/dL    Total Protein 6.8 6.0 - 8.4 g/dL    Albumin 3.3 (L) 3.5 - 5.2 g/dL    Total Bilirubin 0.4 0.1 - 1.0 mg/dL    Alkaline Phosphatase 115 55 - 135 U/L    AST 18 10 - 40 U/L    ALT 11 10 - 44 U/L    Anion Gap 13 8 - 16 mmol/L    eGFR 50 (A) >60 mL/min/1.73 m^2   Cologuard Screening (Multitarget Stool DNA)    Collection Time: 03/10/23  6:10 PM    Specimen: Rectum; Stool   Result Value Ref Range    Cologuard Result Sample Could Not Be Processed 4 N/A   TROPONIN    Collection Time: 05/06/23  9:57 AM   Result Value Ref Range    Troponin I <0.01 0.00 - 0.03 ng/mL   MAGNESIUM    Collection Time: 05/07/23  3:01 AM   Result Value Ref Range    Magnesium Level 1.7 1.6 - 2.6 MG/DL   Brain natriuretic peptide    Collection Time: 05/07/23  3:01 AM   Result Value Ref Range    BNP 88 15 - 159 PG/ML   CBC Auto Differential    Collection Time: 05/23/23  1:45 PM   Result Value Ref Range    WBC 6.76 3.90 - 12.70 K/uL    RBC 3.41 (L) 4.00 - 5.40 M/uL    Hemoglobin 10.3 (L) 12.0 - 16.0 g/dL    Hematocrit 32.7 (L) 37.0 - 48.5 %    MCV 96 82 - 98 fL    MCH 30.2 27.0 - 31.0 pg    MCHC 31.5 (L) 32.0 - 36.0 g/dL    RDW 14.6 (H) 11.5 - 14.5 %    Platelets 163 150 - 450 K/uL    MPV 11.5 9.2 - 12.9 fL    Immature Granulocytes 0.6 (H) 0.0 - 0.5 %    Gran # (ANC) 5.0 1.8 - 7.7 K/uL    Immature Grans (Abs) 0.04 0.00 - 0.04 K/uL    Lymph # 1.0 1.0 - 4.8 K/uL    Mono # 0.7 0.3 - 1.0 K/uL    Eos # 0.1 0.0 - 0.5 K/uL    Baso # 0.01 0.00 - 0.20 K/uL    nRBC 0 0 /100 WBC    Gran % 73.4 (H) 38.0 - 73.0 %    Lymph % 14.2 (L) 18.0  - 48.0 %    Mono % 10.1 4.0 - 15.0 %    Eosinophil % 1.6 0.0 - 8.0 %    Basophil % 0.1 0.0 - 1.9 %    Differential Method Automated    Comprehensive Metabolic Panel    Collection Time: 05/23/23  1:45 PM   Result Value Ref Range    Sodium 136 136 - 145 mmol/L    Potassium 5.0 3.5 - 5.1 mmol/L    Chloride 98 95 - 110 mmol/L    CO2 28 23 - 29 mmol/L    Glucose 207 (H) 70 - 110 mg/dL    BUN 33 (H) 8 - 23 mg/dL    Creatinine 1.8 (H) 0.5 - 1.4 mg/dL    Calcium 9.2 8.7 - 10.5 mg/dL    Total Protein 6.7 6.0 - 8.4 g/dL    Albumin 3.2 (L) 3.5 - 5.2 g/dL    Total Bilirubin 0.3 0.1 - 1.0 mg/dL    Alkaline Phosphatase 82 55 - 135 U/L    AST 16 10 - 40 U/L    ALT 13 10 - 44 U/L    Anion Gap 10 8 - 16 mmol/L    eGFR 30 (A) >60 mL/min/1.73 m^2   CBC Auto Differential    Collection Time: 05/23/23  1:45 PM   Result Value Ref Range    WBC 6.76 3.90 - 12.70 K/uL    RBC 3.41 (L) 4.00 - 5.40 M/uL    Hemoglobin 10.3 (L) 12.0 - 16.0 g/dL    Hematocrit 32.7 (L) 37.0 - 48.5 %    MCV 96 82 - 98 fL    MCH 30.2 27.0 - 31.0 pg    MCHC 31.5 (L) 32.0 - 36.0 g/dL    RDW 14.6 (H) 11.5 - 14.5 %    Platelets 163 150 - 450 K/uL    MPV 11.5 9.2 - 12.9 fL    Immature Granulocytes 0.6 (H) 0.0 - 0.5 %    Gran # (ANC) 5.0 1.8 - 7.7 K/uL    Immature Grans (Abs) 0.04 0.00 - 0.04 K/uL    Lymph # 1.0 1.0 - 4.8 K/uL    Mono # 0.7 0.3 - 1.0 K/uL    Eos # 0.1 0.0 - 0.5 K/uL    Baso # 0.01 0.00 - 0.20 K/uL    nRBC 0 0 /100 WBC    Gran % 73.4 (H) 38.0 - 73.0 %    Lymph % 14.2 (L) 18.0 - 48.0 %    Mono % 10.1 4.0 - 15.0 %    Eosinophil % 1.6 0.0 - 8.0 %    Basophil % 0.1 0.0 - 1.9 %    Differential Method Automated    Comprehensive Metabolic Panel    Collection Time: 05/23/23  1:45 PM   Result Value Ref Range    Sodium 136 136 - 145 mmol/L    Potassium 5.0 3.5 - 5.1 mmol/L    Chloride 98 95 - 110 mmol/L    CO2 28 23 - 29 mmol/L    Glucose 207 (H) 70 - 110 mg/dL    BUN 33 (H) 8 - 23 mg/dL    Creatinine 1.8 (H) 0.5 - 1.4 mg/dL    Calcium 9.2 8.7 - 10.5 mg/dL    Total  Protein 6.7 6.0 - 8.4 g/dL    Albumin 3.2 (L) 3.5 - 5.2 g/dL    Total Bilirubin 0.3 0.1 - 1.0 mg/dL    Alkaline Phosphatase 82 55 - 135 U/L    AST 16 10 - 40 U/L    ALT 13 10 - 44 U/L    Anion Gap 10 8 - 16 mmol/L    eGFR 30 (A) >60 mL/min/1.73 m^2   Ferritin    Collection Time: 05/23/23  1:45 PM   Result Value Ref Range    Ferritin 459 (H) 20.0 - 300.0 ng/mL   Iron and TIBC    Collection Time: 05/23/23  1:45 PM   Result Value Ref Range    Iron 14 (L) 30 - 160 ug/dL    Transferrin 208 200 - 375 mg/dL    TIBC 308 250 - 450 ug/dL    Saturated Iron 5 (L) 20 - 50 %   Vitamin B12    Collection Time: 05/23/23  1:45 PM   Result Value Ref Range    Vitamin B-12 1941 (H) 210 - 950 pg/mL         Assessment    1. Primary hypertension    2. Chronic obstructive pulmonary disease, unspecified COPD type    3. Stage 3a chronic kidney disease    4. Neuropathy    5. Anxiety    6. Gastroesophageal reflux disease without esophagitis    7. Hyperlipidemia, unspecified hyperlipidemia type    8. Chronic diastolic congestive heart failure    9. Sleep difficulties    10. Colon cancer screening          Plan    Jasmina was seen today for follow-up.    Diagnoses and all orders for this visit:    Primary hypertension  -     losartan (COZAAR) 25 MG tablet; Take 1 tablet (25 mg total) by mouth once daily.  -     metoprolol succinate (TOPROL-XL) 25 MG 24 hr tablet; Take 1 tablet (25 mg total) by mouth once daily.    Chronic obstructive pulmonary disease, unspecified COPD type    Stage 3a chronic kidney disease    Neuropathy    Anxiety    Gastroesophageal reflux disease without esophagitis  -     pantoprazole (PROTONIX) 40 MG tablet; Take 1 tablet (40 mg total) by mouth once daily.    Hyperlipidemia, unspecified hyperlipidemia type  -     rosuvastatin (CRESTOR) 10 MG tablet; Take 1 tablet (10 mg total) by mouth every evening.    Chronic diastolic congestive heart failure  -     bumetanide (BUMEX) 1 MG tablet; Take 0.5 tablets (0.5 mg total) by mouth  every other day.  -     metoprolol succinate (TOPROL-XL) 25 MG 24 hr tablet; Take 1 tablet (25 mg total) by mouth once daily.    Sleep difficulties  -     mirtazapine (REMERON) 15 MG tablet; Take 1 tablet (15 mg total) by mouth nightly as needed (sleep).    Colon cancer screening  -     Cologuard Screening (Multitarget Stool DNA); Future  -     Cologuard Screening (Multitarget Stool DNA)    Other orders  -     cetirizine (ZYRTEC) 10 MG tablet; Take 1 tablet (10 mg total) by mouth once daily.  -     allopurinoL (ZYLOPRIM) 100 MG tablet; Take 1 tablet (100 mg total) by mouth once daily.  -     folic acid (FOLVITE) 1 MG tablet; Take 1 tablet (1,000 mcg total) by mouth every morning.      Physically, everything looks okay today.      Med refills sent to pharmacy.      New order for Cologuard box placed today.    FOLLOW-UP:  Follow up in about 6 months (around 12/5/2023) for check up.    I spent a total of 45 minutes face to face and non-face to face on the date of this visit.This includes time preparing to see the patient (eg, review of tests, notes), obtaining and/or reviewing additional history from an independent historian and/or outside medical records, documenting clinical information in the electronic health record, independently interpreting results and/or communicating results to the patient/family/caregiver, or care coordinator.    Signed by:  Brock Ortiz MD

## 2023-06-06 DIAGNOSIS — M79.10 MYALGIA: ICD-10-CM

## 2023-06-06 NOTE — TELEPHONE ENCOUNTER
Refill Routing Note   Refill Routing Note   Medication(s) are not appropriate for processing by [facility] for the following reason(s):      Medication outside of protocol    [facility] action(s):  Route None identified        Medication reconciliation completed: No     Appointments  past 12m or future 3m with PCP    Date Provider   Last Visit   6/5/2023 Brock Ortiz MD   Next Visit   Visit date not found Brock Ortiz MD   ED visits in past 90 days: 0        Note composed:6:02 PM 06/06/2023

## 2023-06-07 RX ORDER — CELECOXIB 50 MG/1
CAPSULE ORAL
Qty: 60 CAPSULE | Refills: 0 | Status: SHIPPED | OUTPATIENT
Start: 2023-06-07 | End: 2023-08-29

## 2023-08-02 ENCOUNTER — TELEPHONE (OUTPATIENT)
Dept: HEMATOLOGY/ONCOLOGY | Facility: CLINIC | Age: 68
End: 2023-08-02
Payer: MEDICARE

## 2023-08-02 NOTE — TELEPHONE ENCOUNTER
Nurse spoke with pt in regards to rescheduling her appt due needing labs prior. Pt has been rescheduled. Verbalized understanding.

## 2023-08-23 ENCOUNTER — TELEPHONE (OUTPATIENT)
Dept: ADMINISTRATIVE | Facility: HOSPITAL | Age: 68
End: 2023-08-23
Payer: MEDICARE

## 2023-08-23 ENCOUNTER — TELEPHONE (OUTPATIENT)
Dept: HEMATOLOGY/ONCOLOGY | Facility: CLINIC | Age: 68
End: 2023-08-23
Payer: MEDICARE

## 2023-08-23 NOTE — TELEPHONE ENCOUNTER
Nurse spoke with pt in regards to rescheduling her appt. Pt has been rescheduled, verbalized understanding of rescheduled appt

## 2023-08-23 NOTE — TELEPHONE ENCOUNTER
----- Message from Lesa Li sent at 8/23/2023  9:46 AM CDT -----  Patient is requesting the nurse give her a call back concerning r/s the appt she has coming up. [phone]

## 2023-08-28 DIAGNOSIS — M79.10 MYALGIA: ICD-10-CM

## 2023-08-29 RX ORDER — CELECOXIB 50 MG/1
CAPSULE ORAL
Qty: 60 CAPSULE | Refills: 0 | Status: SHIPPED | OUTPATIENT
Start: 2023-08-29 | End: 2023-09-21

## 2023-09-11 ENCOUNTER — TELEPHONE (OUTPATIENT)
Dept: HEMATOLOGY/ONCOLOGY | Facility: CLINIC | Age: 68
End: 2023-09-11
Payer: MEDICARE

## 2023-09-11 DIAGNOSIS — R30.0 DYSURIA: Primary | ICD-10-CM

## 2023-09-11 PROBLEM — I50.32 CHRONIC DIASTOLIC HEART FAILURE: Status: ACTIVE | Noted: 2022-04-03

## 2023-09-11 NOTE — TELEPHONE ENCOUNTER
Nurse spoke with pt , pt stated she started on bactrim on 9/10  and stated she think she possibly has an odor. Pt stated she is not really in much discomfort but there is some. Nurse informed the pt that a message will be sent to the provider to advise. Pt verbalized understanding. Call ended well.

## 2023-09-11 NOTE — TELEPHONE ENCOUNTER
----- Message from Kitty Montague sent at 9/11/2023  8:24 AM CDT -----  Contact: Jasmina Coleman is calling in regards to getting orders for urinalysis pt believe she has a possible UTI. Please call back at [phone]                              Thanks  KT

## 2023-09-21 DIAGNOSIS — M79.10 MYALGIA: ICD-10-CM

## 2023-09-21 RX ORDER — CELECOXIB 50 MG/1
CAPSULE ORAL
Qty: 60 CAPSULE | Refills: 0 | Status: SHIPPED | OUTPATIENT
Start: 2023-09-21 | End: 2024-02-15 | Stop reason: ALTCHOICE

## 2023-10-20 ENCOUNTER — TELEPHONE (OUTPATIENT)
Dept: HEMATOLOGY/ONCOLOGY | Facility: CLINIC | Age: 68
End: 2023-10-20
Payer: MEDICARE

## 2023-10-20 NOTE — TELEPHONE ENCOUNTER
Nurse spoke with pt in regards to her labs. Pt stated she would like to know if Lore can call or send a message with lab results. Nurse informed pt that the provider is gone for the day and the nurse will call back to inform on Monday. Pt verbalized understanding. Call ended well.

## 2023-10-20 NOTE — TELEPHONE ENCOUNTER
N/a nurse left  for pt to call back in regards to rescheduling her appt due lab work needing to be drawn 2day prior

## 2023-10-20 NOTE — TELEPHONE ENCOUNTER
----- Message from Natanael Abbott sent at 10/20/2023  3:27 PM CDT -----  Contact: cristin  .Type:  Patient Returning Call    Who Called:cristin   Who Left Message for Patient: Ronald Jin MA   Does the patient know what this is regarding?:   Would the patient rather a call back or a response via [facility]? Call   Best Call Back Number:.[phone]

## 2023-10-31 ENCOUNTER — OFFICE VISIT (OUTPATIENT)
Dept: HOME HEALTH SERVICES | Facility: CLINIC | Age: 68
End: 2023-10-31
Payer: MEDICARE

## 2023-10-31 VITALS
SYSTOLIC BLOOD PRESSURE: 125 MMHG | WEIGHT: 122 LBS | HEART RATE: 66 BPM | OXYGEN SATURATION: 96 % | TEMPERATURE: 98 F | HEIGHT: 62 IN | DIASTOLIC BLOOD PRESSURE: 79 MMHG | BODY MASS INDEX: 22.45 KG/M2

## 2023-10-31 DIAGNOSIS — I25.119 CORONARY ARTERY DISEASE INVOLVING NATIVE CORONARY ARTERY OF NATIVE HEART WITH ANGINA PECTORIS: ICD-10-CM

## 2023-10-31 DIAGNOSIS — J41.8 MIXED SIMPLE AND MUCOPURULENT CHRONIC BRONCHITIS: ICD-10-CM

## 2023-10-31 DIAGNOSIS — Z72.0 TOBACCO USE: ICD-10-CM

## 2023-10-31 DIAGNOSIS — Z00.00 ENCOUNTER FOR PREVENTIVE HEALTH EXAMINATION: Primary | ICD-10-CM

## 2023-10-31 DIAGNOSIS — I50.32 CHRONIC DIASTOLIC HEART FAILURE: ICD-10-CM

## 2023-10-31 DIAGNOSIS — Z78.0 POSTMENOPAUSAL: ICD-10-CM

## 2023-10-31 DIAGNOSIS — D69.2 SENILE PURPURA: ICD-10-CM

## 2023-10-31 DIAGNOSIS — G60.3 IDIOPATHIC PROGRESSIVE NEUROPATHY: ICD-10-CM

## 2023-10-31 DIAGNOSIS — D50.9 IRON DEFICIENCY ANEMIA, UNSPECIFIED IRON DEFICIENCY ANEMIA TYPE: ICD-10-CM

## 2023-10-31 DIAGNOSIS — I10 ESSENTIAL HYPERTENSION: ICD-10-CM

## 2023-10-31 DIAGNOSIS — G89.4 CHRONIC PAIN SYNDROME: ICD-10-CM

## 2023-10-31 DIAGNOSIS — K74.60 HEPATIC CIRRHOSIS, UNSPECIFIED HEPATIC CIRRHOSIS TYPE: ICD-10-CM

## 2023-10-31 DIAGNOSIS — I70.0 AORTIC ATHEROSCLEROSIS: ICD-10-CM

## 2023-10-31 DIAGNOSIS — E78.2 MIXED HYPERLIPIDEMIA: ICD-10-CM

## 2023-10-31 DIAGNOSIS — N18.31 STAGE 3A CHRONIC KIDNEY DISEASE: ICD-10-CM

## 2023-10-31 DIAGNOSIS — M79.671 PAIN IN RIGHT FOOT: ICD-10-CM

## 2023-10-31 DIAGNOSIS — F17.210 NICOTINE DEPENDENCE, CIGARETTES, UNCOMPLICATED: ICD-10-CM

## 2023-10-31 DIAGNOSIS — J96.11 CHRONIC RESPIRATORY FAILURE WITH HYPOXIA: ICD-10-CM

## 2023-10-31 DIAGNOSIS — J44.9 SEVERE CHRONIC OBSTRUCTIVE PULMONARY DISEASE: ICD-10-CM

## 2023-10-31 DIAGNOSIS — D64.9 NORMOCYTIC ANEMIA: ICD-10-CM

## 2023-10-31 PROCEDURE — 3074F PR MOST RECENT SYSTOLIC BLOOD PRESSURE < 130 MM HG: ICD-10-PCS | Mod: CPTII,S$GLB,, | Performed by: NURSE PRACTITIONER

## 2023-10-31 PROCEDURE — 3078F PR MOST RECENT DIASTOLIC BLOOD PRESSURE < 80 MM HG: ICD-10-PCS | Mod: CPTII,S$GLB,, | Performed by: NURSE PRACTITIONER

## 2023-10-31 PROCEDURE — 1100F PTFALLS ASSESS-DOCD GE2>/YR: CPT | Mod: CPTII,S$GLB,, | Performed by: NURSE PRACTITIONER

## 2023-10-31 PROCEDURE — G0439 PR MEDICARE ANNUAL WELLNESS SUBSEQUENT VISIT: ICD-10-PCS | Mod: S$GLB,,, | Performed by: NURSE PRACTITIONER

## 2023-10-31 PROCEDURE — 3078F DIAST BP <80 MM HG: CPT | Mod: CPTII,S$GLB,, | Performed by: NURSE PRACTITIONER

## 2023-10-31 PROCEDURE — 1100F PR PT FALLS ASSESS DOC 2+ FALLS/FALL W/INJURY/YR: ICD-10-PCS | Mod: CPTII,S$GLB,, | Performed by: NURSE PRACTITIONER

## 2023-10-31 PROCEDURE — 3288F PR FALLS RISK ASSESSMENT DOCUMENTED: ICD-10-PCS | Mod: CPTII,S$GLB,, | Performed by: NURSE PRACTITIONER

## 2023-10-31 PROCEDURE — 3288F FALL RISK ASSESSMENT DOCD: CPT | Mod: CPTII,S$GLB,, | Performed by: NURSE PRACTITIONER

## 2023-10-31 PROCEDURE — G0439 PPPS, SUBSEQ VISIT: HCPCS | Mod: S$GLB,,, | Performed by: NURSE PRACTITIONER

## 2023-10-31 PROCEDURE — 4010F ACE/ARB THERAPY RXD/TAKEN: CPT | Mod: CPTII,S$GLB,, | Performed by: NURSE PRACTITIONER

## 2023-10-31 PROCEDURE — 1125F AMNT PAIN NOTED PAIN PRSNT: CPT | Mod: CPTII,S$GLB,, | Performed by: NURSE PRACTITIONER

## 2023-10-31 PROCEDURE — 1159F PR MEDICATION LIST DOCUMENTED IN MEDICAL RECORD: ICD-10-PCS | Mod: CPTII,S$GLB,, | Performed by: NURSE PRACTITIONER

## 2023-10-31 PROCEDURE — 4010F PR ACE/ARB THEARPY RXD/TAKEN: ICD-10-PCS | Mod: CPTII,S$GLB,, | Performed by: NURSE PRACTITIONER

## 2023-10-31 PROCEDURE — 1125F PR PAIN SEVERITY QUANTIFIED, PAIN PRESENT: ICD-10-PCS | Mod: CPTII,S$GLB,, | Performed by: NURSE PRACTITIONER

## 2023-10-31 PROCEDURE — 3074F SYST BP LT 130 MM HG: CPT | Mod: CPTII,S$GLB,, | Performed by: NURSE PRACTITIONER

## 2023-10-31 PROCEDURE — 1160F RVW MEDS BY RX/DR IN RCRD: CPT | Mod: CPTII,S$GLB,, | Performed by: NURSE PRACTITIONER

## 2023-10-31 PROCEDURE — 1159F MED LIST DOCD IN RCRD: CPT | Mod: CPTII,S$GLB,, | Performed by: NURSE PRACTITIONER

## 2023-10-31 PROCEDURE — 1160F PR REVIEW ALL MEDS BY PRESCRIBER/CLIN PHARMACIST DOCUMENTED: ICD-10-PCS | Mod: CPTII,S$GLB,, | Performed by: NURSE PRACTITIONER

## 2023-10-31 NOTE — PATIENT INSTRUCTIONS
Counseling and Referral of Other Preventative  (Italic type indicates deductible and co-insurance are waived)    Patient Name: Jasmina Sharma  Today's Date: 10/31/2023    Health Maintenance       Date Due Completion Date    TETANUS VACCINE Never done ---    DEXA Scan Never done ---    Colorectal Cancer Screening Never done ---    RSV Vaccine (Age 60+) (1 - 1-dose 60+ series) Never done ---    Shingles Vaccine (2 of 3) 04/27/2017 3/2/2017    LDCT Lung Screen 08/22/2023 8/22/2022    Influenza Vaccine (1) 09/01/2023 11/16/2022    Pneumococcal Vaccines (Age 65+) (3 - PPSV23 or PCV20) 11/27/2023 12/19/2019    COVID-19 Vaccine (3 - 2023-24 season) 10/31/2024 (Originally 9/1/2023) 2/11/2021    Mammogram 02/23/2024 2/23/2023    Lipid Panel 10/24/2027 10/24/2022        Orders Placed This Encounter   Procedures    DXA Bone Density Axial Skeleton 1 or more sites     The following information is provided to all patients.  This information is to help you find resources for any of the problems found today that may be affecting your health:                Living healthy guide: www.[facility].[place].gov      Understanding Diabetes: www.diabetes.org      Eating healthy: www.cdc.gov/healthyweight      [facility] home safety checklist: www.cdc.gov/steadi/patient.html      Agency on Aging: www.goea.[place].[facility]      Alcoholics anonymous (AA): www.aa.org      Physical Activity: www.yeni.nih.gov/os1nsjf      Tobacco use: www.quitwithusla.org

## 2023-10-31 NOTE — Clinical Note
Medicare awv complete. Health maintenance:  Tetanus vaccine, RSV vaccine, flu vaccine due and pneumococcal 3rd vaccine due soon-encouraged patient to obtain at a pharmacy.  Patient declined updated COVID-19 vaccine.  Low-dose CT lung screen ordered.  DEXA scan ordered and message sent to MA to call patient to schedule.  Colorectal cancer screening was ordered 03/16/2023.  Patient states she has had difficulty with the collection and  and will call lab to redo.  Aspirin/antiplatelet therapy recommended-defer to PCP.

## 2023-10-31 NOTE — PROGRESS NOTES
"Jasmina Sharma presented for Medicare AWV today. The following components were reviewed and updated:    Medical history  Family History  Social history  Allergies and Current Medications  Health Risk Assessment  Health Maintenance  Care Team    **See Completed Assessments for Annual Wellness visit with in the encounter summary    The following assessments were completed:  Depression Screening  Cognitive function Screening      Timed Get Up Test  Whisper Test    Vitals:    10/31/23 1303   BP: 125/79   Pulse: 66   Temp: 97.5 °F (36.4 °C)   TempSrc: Temporal   SpO2: 96%   Weight: 55.3 kg (122 lb)   Height: 5' 2" (1.575 m)     Body mass index is 22.31 kg/m².   ]    Physical Exam  Vitals reviewed.   Constitutional:       Appearance: Normal appearance.   HENT:      Head: Normocephalic and atraumatic.   Cardiovascular:      Rate and Rhythm: Normal rate and regular rhythm.      Pulses: Normal pulses.      Heart sounds: Normal heart sounds.   Pulmonary:      Effort: Pulmonary effort is normal.      Breath sounds: Normal breath sounds.   Musculoskeletal:         General: Normal range of motion.      Cervical back: Normal range of motion and neck supple.   Skin:     General: Skin is warm and dry.      Findings: Bruising (left wrist) present.   Neurological:      Mental Status: She is alert and oriented to person, place, and time.      Gait: Gait abnormal.   Psychiatric:         Mood and Affect: Mood normal.         Behavior: Behavior normal.            Outpatient Medications Marked as Taking for the 10/31/23 encounter (Office Visit) with Mi Reed FNP   Medication Sig Dispense Refill    albuterol (PROVENTIL/VENTOLIN HFA) 90 mcg/actuation inhaler Inhale 2 puffs into the lungs every 6 (six) hours as needed.      albuterol-ipratropium (DUO-NEB) 2.5 mg-0.5 mg/3 mL nebulizer solution Take by nebulization every 6 (six) hours as needed.      allopurinoL (ZYLOPRIM) 100 MG tablet Take 1 tablet (100 mg total) by mouth once " daily. 90 tablet 3    bumetanide (BUMEX) 1 MG tablet Take 0.5 tablets (0.5 mg total) by mouth every other day. 45 tablet 3    celecoxib (CELEBREX) 50 MG capsule TAKE 1 CAPSULE BY MOUTH 2 TIMES DAILY. 60 capsule 0    clonazePAM (KLONOPIN) 0.5 MG tablet Take 0.25 mg by mouth nightly as needed.      ferrous sulfate (FEOSOL) 325 mg (65 mg iron) Tab tablet Take 1 tablet (325 mg total) by mouth once daily. 90 tablet 0    fluticasone propionate (FLONASE) 50 mcg/actuation nasal spray fluticasone propionate 50 mcg/actuation nasal spray,suspension   USE 1 SPRAY IN EACH NOSTRIL DAILY      folic acid (FOLVITE) 1 MG tablet Take 1 tablet (1,000 mcg total) by mouth every morning. 90 tablet 3    HYDROcodone-acetaminophen (NORCO) 7.5-325 mg per tablet SMARTSI Tablet(s) By Mouth Every 12 Hours PRN      losartan (COZAAR) 25 MG tablet Take 1 tablet (25 mg total) by mouth once daily. 90 tablet 3    metoprolol succinate (TOPROL-XL) 25 MG 24 hr tablet Take 1 tablet (25 mg total) by mouth once daily. 90 tablet 3    mirtazapine (REMERON) 15 MG tablet Take 1 tablet (15 mg total) by mouth nightly as needed (sleep). 90 tablet 3    pantoprazole (PROTONIX) 40 MG tablet Take 1 tablet (40 mg total) by mouth once daily. 90 tablet 3    pramipexole (MIRAPEX) 0.75 MG tablet pramipexole 0.75 mg tablet   TAKE 1 TABLET BY MOUTH AT BEDTIME FOR RESTLESS LEGS      pregabalin (LYRICA) 50 MG capsule Take 50 mg by mouth 3 (three) times daily as needed.      rosuvastatin (CRESTOR) 10 MG tablet Take 1 tablet (10 mg total) by mouth every evening. 90 tablet 3    TRELEGY ELLIPTA 100-62.5-25 mcg DsDv SMARTSI Inhalation Via Inhaler Daily          Diagnoses and health risks identified today and associated recommendations/orders:  1. Encounter for preventive health examination  Medicare awv complete. Health maintenance:  Tetanus vaccine, RSV vaccine, flu vaccine due and pneumococcal 3rd vaccine due soon-encouraged patient to obtain at a pharmacy.  Patient  declined updated COVID-19 vaccine.  Low-dose CT lung screen ordered.  DEXA scan ordered and message sent to MA to call patient to schedule.  Colorectal cancer screening was ordered 03/16/2023.  Patient states she has had difficulty with the collection and  and will call lab to redo.  Aspirin/antiplatelet therapy recommended-defer to PCP.     2. Severe chronic obstructive pulmonary disease  Chronic and stable. No acute issues. Continue current management. See med list above. Follow up with pulmonology.      3. Chronic respiratory failure with hypoxia  Chronic and stable. No acute issues. Continue current management. See med list above. Follow up with pulmonology.      4. Mixed simple and mucopurulent chronic bronchitis  Chronic and stable. No acute issues. Continue current management. See med list above. Follow up with pulmonology.      5. Coronary artery disease involving native coronary artery of native heart with angina pectoris  Chronic and stable. Continue current management. See med list above. Follow up with cardiology.      6. Stage 3a chronic kidney disease   Latest Reference Range & Units 10/24/22 15:12 11/03/22 16:31 11/16/22 15:20 12/01/22 12:20 12/09/22 13:30 02/14/23 14:14 03/06/23 13:28 05/23/23 13:45   eGFR >60 mL/min/1.73 m^2  >60 mL/min/1.73 m^2 49.9 ! >60 >60 38 ! 50 !  50 ! 38 ! 50 ! 30 !  30 !   !: Data is abnormal  Chronic and stable. Continue current management. Recommend avoid nsaids and other nephrotoxic medications. Follow up with PCP/nephrologist.      7. Aortic atherosclerosis  CXR 12/9/22 Aorta demonstrates atherosclerotic disease.  Chronic and stable on statin medication. Continue current. F/u with pcp.      8. Hepatic cirrhosis, unspecified hepatic cirrhosis type   Latest Reference Range & Units 10/24/22 15:12 11/03/22 16:31 11/16/22 15:20 12/01/22 12:20 12/09/22 13:30 02/14/23 14:14 03/06/23 13:28 05/23/23 13:45   AST 10 - 40 U/L  10 - 40 U/L 16 18 20 19 18 18 18 16  16   ALT 10  "- 44 U/L  10 - 44 U/L 9 (L) 8 (L) 11 10 8 (L) 11 11 13  13   (L): Data is abnormally low  stable. Pt states she will call to schedule an appointment with hepatology.     9. Chronic diastolic heart failure  Chronic and stable. Continue current management. See med list above. Follow up with cardiology.      10. Senile purpura  Chronic and stable. Continue current management. See med list above. Follow up with PCP.     11. Idiopathic progressive neuropathy  Chronic and stable. Continue current management. See med list above. Follow up with PCP.     12. Chronic pain syndrome  Patient on chronic opioids. Hydrocodone-acetaminophen prn.   Risk factors reviewed for any potential opioid use disorder   Pain evaluated during visit.  Current treatment plan documented.  Will refer to specialist, as appropriate.       13. Normocytic anemia  Chronic and stable. Continue current management. See med list above. Follow up with PCP.     14. Iron deficiency anemia, unspecified iron deficiency anemia type  Chronic and stable. Continue current management. See med list above. Follow up with PCP.     15. Tobacco use  Quit smoking about a week ago.   - CT Chest Lung Screening Low Dose; Future    16. Nicotine dependence, cigarettes, uncomplicated  Quit smoking about a week ago.   - CT Chest Lung Screening Low Dose; Future    17. Essential hypertension  Chronic and stable on BP medication.  Continue current management.  See med list above. Recommend low sodium diet. Follow up with PCP.       18. Mixed hyperlipidemia  Lab Results   Component Value Date    CHOL 152 10/24/2022    CHOL 241 (H) 04/27/2022    CHOL 177 04/04/2022     Lab Results   Component Value Date    HDL 45 10/24/2022    HDL 60 04/27/2022    HDL 71 04/04/2022     Lab Results   Component Value Date    LDLCALC 75.8 10/24/2022    LDLCALC 133 (H) 04/27/2022    LDLCALC 91 04/04/2022     No results found for: "DLDL"  Lab Results   Component Value Date    TRIG 156 (H) 10/24/2022    " TRIG 268 (H) 04/27/2022    TRIG 73 04/04/2022       f1   Lab Results   Component Value Date    CHOLHDL 29.6 10/24/2022    Chronic and stable on statin medication. Continue current. F/u with pcp.      19. Pain in right foot  Chronic and stable. Continue current management. See med list above. Follow up with PCP.     20. Postmenopausal  - DXA Bone Density Axial Skeleton 1 or more sites; Future        Provided Jasmina with a 5-10 year written screening schedule and personal prevention plan. Recommendations were developed using the USPSTF age appropriate recommendations. Education, counseling, and referrals were provided as needed.  After Visit Summary printed and given to patient which includes a list of additional screenings\tests needed.    Follow up in about 1 year (around 10/31/2024) for annual wellness visit.      EDILBERTO Vargas    I offered to discuss advanced care planning, including how to pick a person who would make decisions for you if you were unable to make them for yourself, called a health care power of , and what kind of decisions you might make such as use of life sustaining treatments such as ventilators and tube feeding when faced with a life limiting illness recorded on a living will that they will need to know. (How you want to be cared for as you near the end of your natural life)     X Patient is interested in learning more about how to make advanced directives.  I provided them paperwork and offered to discuss this with them.

## 2023-11-02 ENCOUNTER — TELEPHONE (OUTPATIENT)
Dept: ADMINISTRATIVE | Facility: CLINIC | Age: 68
End: 2023-11-02
Payer: MEDICARE

## 2023-11-02 PROBLEM — I70.0 AORTIC ATHEROSCLEROSIS: Status: ACTIVE | Noted: 2023-11-02

## 2023-11-02 PROBLEM — I70.0 AORTIC ATHEROSCLEROSIS: Status: ACTIVE | Noted: 2023-10-31

## 2023-11-02 PROBLEM — D69.2 SENILE PURPURA: Status: ACTIVE | Noted: 2023-11-02

## 2023-11-02 PROBLEM — Z72.0 TOBACCO USE: Status: ACTIVE | Noted: 2023-11-02

## 2023-11-02 PROBLEM — I25.119 CORONARY ARTERY DISEASE INVOLVING NATIVE CORONARY ARTERY OF NATIVE HEART WITH ANGINA PECTORIS: Status: ACTIVE | Noted: 2022-10-27

## 2023-11-02 NOTE — TELEPHONE ENCOUNTER
----- Message from EDILBERTO Vargas sent at 10/31/2023  1:49 PM CDT -----  Please call pt to schedule her dexa scan. She is also due for cologuard. She should be calling lab or Dr. Guzman. I also ordered a LDCT lung screen.   Thank you

## 2023-11-02 NOTE — TELEPHONE ENCOUNTER
Called pt; no answer; left message informing pt I was calling to schedule her an appt for her dexa scan and LDCT lung screen per provider's message and to return my call; left my name and number

## 2023-11-03 ENCOUNTER — PATIENT MESSAGE (OUTPATIENT)
Dept: INTERNAL MEDICINE | Facility: CLINIC | Age: 68
End: 2023-11-03
Payer: MEDICARE

## 2023-11-08 ENCOUNTER — TELEPHONE (OUTPATIENT)
Dept: ADMINISTRATIVE | Facility: CLINIC | Age: 68
End: 2023-11-08
Payer: MEDICARE

## 2023-11-08 NOTE — TELEPHONE ENCOUNTER
----- Message from Gia Sharma sent at 11/8/2023  1:18 PM CST -----  .Type:  Patient Returning Call    Who Called: PT     Who Left Message for Patient: Diane Craft     Does the patient know what this is regarding?: MISSED CALL     Would the patient rather a call back YES      Best Call Back Number: [phone]    Additional Information:  THANK YOU

## 2023-11-08 NOTE — TELEPHONE ENCOUNTER
Returned pt's call, she states she will call back when she is able to schedule her radiology appts, bernice.

## 2023-12-07 ENCOUNTER — OFFICE VISIT (OUTPATIENT)
Dept: PRIMARY CARE CLINIC | Facility: CLINIC | Age: 68
End: 2023-12-07
Payer: MEDICARE

## 2023-12-07 VITALS
HEART RATE: 63 BPM | BODY MASS INDEX: 23 KG/M2 | WEIGHT: 125 LBS | DIASTOLIC BLOOD PRESSURE: 78 MMHG | TEMPERATURE: 98 F | SYSTOLIC BLOOD PRESSURE: 120 MMHG | HEIGHT: 62 IN

## 2023-12-07 DIAGNOSIS — N18.31 STAGE 3A CHRONIC KIDNEY DISEASE: ICD-10-CM

## 2023-12-07 DIAGNOSIS — I10 PRIMARY HYPERTENSION: Primary | ICD-10-CM

## 2023-12-07 DIAGNOSIS — E78.5 HYPERLIPIDEMIA, UNSPECIFIED HYPERLIPIDEMIA TYPE: ICD-10-CM

## 2023-12-07 DIAGNOSIS — J44.9 CHRONIC OBSTRUCTIVE PULMONARY DISEASE, UNSPECIFIED COPD TYPE: ICD-10-CM

## 2023-12-07 DIAGNOSIS — F41.9 ANXIETY: ICD-10-CM

## 2023-12-07 DIAGNOSIS — K21.9 GASTROESOPHAGEAL REFLUX DISEASE WITHOUT ESOPHAGITIS: ICD-10-CM

## 2023-12-07 DIAGNOSIS — G62.9 NEUROPATHY: ICD-10-CM

## 2023-12-07 DIAGNOSIS — Z12.11 COLON CANCER SCREENING: ICD-10-CM

## 2023-12-07 PROCEDURE — 3008F PR BODY MASS INDEX (BMI) DOCUMENTED: ICD-10-PCS | Mod: CPTII,S$GLB,, | Performed by: FAMILY MEDICINE

## 2023-12-07 PROCEDURE — 3078F PR MOST RECENT DIASTOLIC BLOOD PRESSURE < 80 MM HG: ICD-10-PCS | Mod: CPTII,S$GLB,, | Performed by: FAMILY MEDICINE

## 2023-12-07 PROCEDURE — 3288F PR FALLS RISK ASSESSMENT DOCUMENTED: ICD-10-PCS | Mod: CPTII,S$GLB,, | Performed by: FAMILY MEDICINE

## 2023-12-07 PROCEDURE — 3074F SYST BP LT 130 MM HG: CPT | Mod: CPTII,S$GLB,, | Performed by: FAMILY MEDICINE

## 2023-12-07 PROCEDURE — 99215 OFFICE O/P EST HI 40 MIN: CPT | Mod: S$GLB,,, | Performed by: FAMILY MEDICINE

## 2023-12-07 PROCEDURE — 99999 PR PBB SHADOW E&M-EST. PATIENT-LVL V: CPT | Mod: PBBFAC,,, | Performed by: FAMILY MEDICINE

## 2023-12-07 PROCEDURE — 1126F AMNT PAIN NOTED NONE PRSNT: CPT | Mod: CPTII,S$GLB,, | Performed by: FAMILY MEDICINE

## 2023-12-07 PROCEDURE — 3074F PR MOST RECENT SYSTOLIC BLOOD PRESSURE < 130 MM HG: ICD-10-PCS | Mod: CPTII,S$GLB,, | Performed by: FAMILY MEDICINE

## 2023-12-07 PROCEDURE — 4010F PR ACE/ARB THEARPY RXD/TAKEN: ICD-10-PCS | Mod: CPTII,S$GLB,, | Performed by: FAMILY MEDICINE

## 2023-12-07 PROCEDURE — 3288F FALL RISK ASSESSMENT DOCD: CPT | Mod: CPTII,S$GLB,, | Performed by: FAMILY MEDICINE

## 2023-12-07 PROCEDURE — 99999 PR PBB SHADOW E&M-EST. PATIENT-LVL V: ICD-10-PCS | Mod: PBBFAC,,, | Performed by: FAMILY MEDICINE

## 2023-12-07 PROCEDURE — 99215 PR OFFICE/OUTPT VISIT, EST, LEVL V, 40-54 MIN: ICD-10-PCS | Mod: S$GLB,,, | Performed by: FAMILY MEDICINE

## 2023-12-07 PROCEDURE — 1159F MED LIST DOCD IN RCRD: CPT | Mod: CPTII,S$GLB,, | Performed by: FAMILY MEDICINE

## 2023-12-07 PROCEDURE — 1101F PR PT FALLS ASSESS DOC 0-1 FALLS W/OUT INJ PAST YR: ICD-10-PCS | Mod: CPTII,S$GLB,, | Performed by: FAMILY MEDICINE

## 2023-12-07 PROCEDURE — 1101F PT FALLS ASSESS-DOCD LE1/YR: CPT | Mod: CPTII,S$GLB,, | Performed by: FAMILY MEDICINE

## 2023-12-07 PROCEDURE — 1159F PR MEDICATION LIST DOCUMENTED IN MEDICAL RECORD: ICD-10-PCS | Mod: CPTII,S$GLB,, | Performed by: FAMILY MEDICINE

## 2023-12-07 PROCEDURE — 3078F DIAST BP <80 MM HG: CPT | Mod: CPTII,S$GLB,, | Performed by: FAMILY MEDICINE

## 2023-12-07 PROCEDURE — 1126F PR PAIN SEVERITY QUANTIFIED, NO PAIN PRESENT: ICD-10-PCS | Mod: CPTII,S$GLB,, | Performed by: FAMILY MEDICINE

## 2023-12-07 PROCEDURE — 3008F BODY MASS INDEX DOCD: CPT | Mod: CPTII,S$GLB,, | Performed by: FAMILY MEDICINE

## 2023-12-07 PROCEDURE — 4010F ACE/ARB THERAPY RXD/TAKEN: CPT | Mod: CPTII,S$GLB,, | Performed by: FAMILY MEDICINE

## 2023-12-07 NOTE — PROGRESS NOTES
"    [facility]       [address] [place] [address]      Phone: [phone]      Fax: [phone]    Brock Ortiz MD                Office Visit  12/07/2023        Subjective      HPI:  Jasmina Sharma is a 68 y.o. female presents today in clinic for "Follow-up  ."     68-year-old female presents today to checkup on multiple issues.      Patient states she is doing okay today.  No acute complaints.  No chest pain, shortness O breath.  No fever, chills, body aches.  No coughing, sneezing, URI type symptoms.  Appetite normal.  Bowel movements normal.  No urinary issues.    Attempted to do Cologuard.  Sample could not be processed.  Got a repeat kit in the mail.  Completed that and sent it back, still could not be process.    Has hypertension.  Currently takes losartan 25 mg daily.  No issues with this medication.      Has CHF.  Uses Bumex every other day for leg swelling.  Takes Toprol-XL 25 mg daily.    Has COPD.  Uses Trelegy inhaler daily.  Also uses albuterol inhaler, as needed.  Has nebulizer at home to do DuoNebs.  Supplements with Zyrtec daily.  Follows regularly with her pulmonologist, Dr. Sun.  States that she sees him every month.  Dr. Sun writes her Klonopin.    Has chronic anxiety.  Takes 1/2 tablet of Klonopin, twice a day.  Was taking Celexa 40 mg daily, but this was discontinued due to her declining kidney function.    Has elevated cholesterol levels.  Takes Crestor 10 mg daily for this.    Occasionally has sleep issues.  Uses Remeron 15 mg, nightly, to help with sleep.    Occasional episodes of GERD.  Uses Protonix 40 mg daily.    Occasionally has issues with restless legs.  Uses Mirapex, as needed.    Has chronic neuropathy.  Follows with Pain Management, Dr. BRIAN Dominguez.  He has her on Lyrica.  Also prescribes her Norco, Celebrex.    Had issues with alcohol abuse in the past.  Takes folic acid 1 mg every morning.    Recently quit " smoking.  Doing okay with this.  Occasionally, she will by a pack and then stop again after she finishes it.    PMH: HTN, HLD, CAD, CHF, COPD, chronic neuropathy.    PSH: Multiple cosmetic procedures.    F MH: CAD, DM, lung cancer, liver cancer   Allergies:  Amoxicillin causes itching, rash.  Scopolamine causes itching, rash.  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  Lives alone.    T: Denies current use.  Quit in , when went into the hospital.  Previously, two packs per day x 40+ years  A:  Previously daily.  Quit drinking.    D:  Denies     Exercise:  No regular exercise program.  Occasionally gardens.  Gets short of breath with activity.      Pain management:  Dr. BRIAN Dominguez   Pulmonology:  Dr. Sun   Cardiology:  Dr. Taylor     [id number]   Colon: None        The following were updated and reviewed by myself in the chart: medications, past medical history, past surgical history, family history, social history, and allergies.     Medications:  Current Outpatient Medications on File Prior to Visit   Medication Sig Dispense Refill    albuterol-ipratropium (DUO-NEB) 2.5 mg-0.5 mg/3 mL nebulizer solution Take by nebulization every 6 (six) hours as needed.      allopurinoL (ZYLOPRIM) 100 MG tablet Take 1 tablet (100 mg total) by mouth once daily. 90 tablet 3    bumetanide (BUMEX) 1 MG tablet Take 0.5 tablets (0.5 mg total) by mouth every other day. 45 tablet 3    celecoxib (CELEBREX) 50 MG capsule TAKE 1 CAPSULE BY MOUTH 2 TIMES DAILY. 60 capsule 0    cetirizine (ZYRTEC) 10 MG tablet Take 1 tablet (10 mg total) by mouth once daily. 90 tablet 3    clonazePAM (KLONOPIN) 0.5 MG tablet Take 0.25 mg by mouth nightly as needed.      ferrous sulfate (FEOSOL) 325 mg (65 mg iron) Tab tablet Take 1 tablet (325 mg total) by mouth once daily. 90 tablet 0    fluticasone propionate (FLONASE) 50 mcg/actuation nasal spray fluticasone propionate 50 mcg/actuation nasal spray,suspension   USE 1 SPRAY IN EACH  NOSTRIL DAILY      folic acid (FOLVITE) 1 MG tablet Take 1 tablet (1,000 mcg total) by mouth every morning. 90 tablet 3    HYDROcodone-acetaminophen (NORCO) 7.5-325 mg per tablet SMARTSI Tablet(s) By Mouth Every 12 Hours PRN      losartan (COZAAR) 25 MG tablet Take 1 tablet (25 mg total) by mouth once daily. 90 tablet 3    metoprolol succinate (TOPROL-XL) 25 MG 24 hr tablet Take 1 tablet (25 mg total) by mouth once daily. 90 tablet 3    mirtazapine (REMERON) 15 MG tablet Take 1 tablet (15 mg total) by mouth nightly as needed (sleep). 90 tablet 3    naloxone (NARCAN) 4 mg/actuation Spry naloxone 4 mg/actuation nasal spray   SPRAY 1 SPRAY INTO ONE NOSTRIL REPEAT ANOTHER INTO OPPOSITE NOSTRIL IN 2 TO 3 MINUTES UNTIL PT RESPONSIVE OR HELP ARRIVES      pantoprazole (PROTONIX) 40 MG tablet Take 1 tablet (40 mg total) by mouth once daily. 90 tablet 3    pramipexole (MIRAPEX) 0.75 MG tablet pramipexole 0.75 mg tablet   TAKE 1 TABLET BY MOUTH AT BEDTIME FOR RESTLESS LEGS      pregabalin (LYRICA) 50 MG capsule Take 50 mg by mouth 3 (three) times daily as needed.      rosuvastatin (CRESTOR) 10 MG tablet Take 1 tablet (10 mg total) by mouth every evening. 90 tablet 3    TRELEGY ELLIPTA 100-62.5-25 mcg DsDv SMARTSI Inhalation Via Inhaler Daily      albuterol (PROVENTIL/VENTOLIN HFA) 90 mcg/actuation inhaler Inhale 2 puffs into the lungs every 6 (six) hours as needed.       No current facility-administered medications on file prior to visit.        PMHx:  Past Medical History:   Diagnosis Date    COPD (chronic obstructive pulmonary disease)     Encounter for blood transfusion     Hyperlipidemia     Hypertension       Patient Active Problem List    Diagnosis Date Noted    Senile purpura 2023    Tobacco use 2023    Mixed simple and mucopurulent chronic bronchitis 10/31/2023    Chronic respiratory failure with hypoxia 10/31/2023    Idiopathic progressive neuropathy 10/31/2023    Chronic pain syndrome 10/31/2023     Normocytic anemia 10/31/2023    Iron deficiency anemia 10/31/2023    Aortic atherosclerosis 10/31/2023    Hepatic cirrhosis, unspecified hepatic cirrhosis type 2023    Stage 3a chronic kidney disease 2023    Coronary artery disease involving native coronary artery of native heart with angina pectoris 10/27/2022    Chronic diastolic heart failure 2022    Hyperlipidemia 2019    Severe chronic obstructive pulmonary disease 2018    Essential hypertension 2016        PSHx:  Past Surgical History:   Procedure Laterality Date    AUGMENTATION OF BREAST Bilateral     3 times    BREAST SURGERY          FHx:  Family History   Problem Relation Age of Onset    Hypertension Mother     Memory loss Mother     Hypertension Father     Diabetes Father     Cancer Father         Social:  Social History     Socioeconomic History    Marital status:    Tobacco Use    Smoking status: Former     Current packs/day: 0.00     Average packs/day: 2.0 packs/day for 49.8 years (99.6 ttl pk-yrs)     Types: Cigarettes, Vaping with nicotine     Start date:      Quit date: 10/22/2023     Years since quittin.1     Passive exposure: Past    Smokeless tobacco: Never    Tobacco comments:     Currently vaping low dose 2.4mcg   Substance and Sexual Activity    Alcohol use: Not Currently    Drug use: Never    Sexual activity: Not Currently     Social Determinants of Health     Financial Resource Strain: Medium Risk (10/31/2023)    Overall Financial Resource Strain (CARDIA)     Difficulty of Paying Living Expenses: Somewhat hard   Food Insecurity: No Food Insecurity (10/31/2023)    Hunger Vital Sign     Worried About Running Out of Food in the Last Year: Never true     Ran Out of Food in the Last Year: Never true   Transportation Needs: No Transportation Needs (10/31/2023)    PRAPARE - Transportation     Lack of Transportation (Medical): No     Lack of Transportation (Non-Medical): No   Physical Activity:  "Inactive (10/31/2023)    Exercise Vital Sign     Days of Exercise per Week: 0 days     Minutes of Exercise per Session: 0 min   Stress: No Stress Concern Present (10/31/2023)    [employer] [place] [employer] - Occupational Stress Questionnaire     Feeling of Stress : Not at all   Social Connections: Socially Isolated (10/31/2023)    Social Connection and Isolation Panel [NHANES]     Frequency of Communication with Friends and Family: More than three times a week     Frequency of Social Gatherings with Friends and Family: Twice a week     Attends Confucianism Services: Never     Active Member of Clubs or Organizations: No     Attends Club or Organization Meetings: Never     Marital Status:    Housing Stability: Low Risk  (10/31/2023)    Housing Stability Vital Sign     Unable to Pay for Housing in the Last Year: No     Number of Places Lived in the Last Year: 1     Unstable Housing in the Last Year: No        Allergies:  Review of patient's allergies indicates:   Allergen Reactions    Amoxicillin Hives, Itching and Rash    Scopolamine Itching        ROS:  Review of Systems   Constitutional:  Negative for activity change, appetite change, chills and fever.   HENT:  Negative for congestion, postnasal drip, rhinorrhea, sore throat and trouble swallowing.    Respiratory:  Negative for cough, shortness of breath and wheezing.    Cardiovascular:  Negative for chest pain and palpitations.   Gastrointestinal:  Negative for abdominal pain, constipation, diarrhea, nausea and vomiting.   Genitourinary:  Negative for difficulty urinating.   Musculoskeletal:  Negative for arthralgias and myalgias.   Skin:  Negative for color change and rash.   Neurological:  Negative for speech difficulty and headaches.   All other systems reviewed and are negative.         Objective      /78   Pulse 63   Temp 97.7 °F (36.5 °C)   Ht 5' 2" (1.575 m)   Wt 56.7 kg (125 lb)   BMI 22.86 kg/m²   Ht Readings from Last 3 " "Encounters:   12/07/23 5' 2" (1.575 m)   10/31/23 5' 2" (1.575 m)   06/05/23 5' 2" (1.575 m)     Wt Readings from Last 3 Encounters:   12/07/23 56.7 kg (125 lb)   10/31/23 55.3 kg (122 lb)   06/05/23 54.2 kg (119 lb 7.8 oz)       PHYSICAL EXAM:  Physical Exam  Vitals and nursing note reviewed.   Constitutional:       General: She is not in acute distress.     Appearance: Normal appearance.   HENT:      Head: Normocephalic and atraumatic.      Right Ear: Tympanic membrane, ear canal and external ear normal.      Left Ear: Tympanic membrane, ear canal and external ear normal.      Nose: Nose normal. No congestion or rhinorrhea.      Mouth/Throat:      Mouth: Mucous membranes are moist.      Pharynx: Oropharynx is clear. No oropharyngeal exudate or posterior oropharyngeal erythema.   Eyes:      Extraocular Movements: Extraocular movements intact.      Conjunctiva/sclera: Conjunctivae normal.      Pupils: Pupils are equal, round, and reactive to light.   Cardiovascular:      Rate and Rhythm: Normal rate and regular rhythm.   Pulmonary:      Effort: Pulmonary effort is normal. No respiratory distress.      Breath sounds: No wheezing, rhonchi or rales.   Musculoskeletal:         General: Normal range of motion.      Cervical back: Normal range of motion.   Lymphadenopathy:      Cervical: No cervical adenopathy.   Skin:     General: Skin is warm and dry.      Findings: No rash.   Neurological:      Mental Status: She is alert.              LABS / IMAGING:  No results found for this or any previous visit (from the past 4368 hour(s)).      Assessment    1. Primary hypertension    2. Chronic obstructive pulmonary disease, unspecified COPD type    3. Stage 3a chronic kidney disease    4. Neuropathy    5. Anxiety    6. Gastroesophageal reflux disease without esophagitis    7. Hyperlipidemia, unspecified hyperlipidemia type          Amada Freedman was seen today for follow-up.    Diagnoses and all orders for this " visit:    Primary hypertension    Chronic obstructive pulmonary disease, unspecified COPD type    Stage 3a chronic kidney disease    Neuropathy    Anxiety    Gastroesophageal reflux disease without esophagitis    Hyperlipidemia, unspecified hyperlipidemia type    Physically, everything looks good today.      Will try to reorder the Cologuard test to see if we can actually get a kit completed.    Will get screening labs at future visit.    FOLLOW-UP:  Follow up in about 6 months (around 6/7/2024) for check up.    I spent a total of 45 minutes face to face and non-face to face on the date of this visit.This includes time preparing to see the patient (eg, review of tests, notes), obtaining and/or reviewing additional history from an independent historian and/or outside medical records, documenting clinical information in the electronic health record, independently interpreting results and/or communicating results to the patient/family/caregiver, or care coordinator.    Signed by:  Brock Ortiz MD

## 2023-12-07 NOTE — PATIENT INSTRUCTIONS
Physically, everything looks really good today.      Feel free to let us know if you need refills on medications.  We will be happy to send them to the pharmacy.    New order placed today for Cologuard colon cancer screening test.  [facility] may contact you to verify address and insurance info.  When you receive the kit, please try to complete it asap and send it back to them.  If you have any questions regarding completing the test, feel free to call them directly at [phone].  They have 24/7 telephone support available.     Continue to eat a healthy diet.  Be careful with portion sizes.  Includes lots of fresh fruits, vegetables, whole grains, lean proteins.  See info below.    Keep hydrated.  Be sure to drink at least 8-10, 8 oz, glasses of water every day.    Stay active.  Try to do some sort of physical activity every day.  Nothing outrageous, just try walking for 10-15 minutes each day.

## 2024-01-19 ENCOUNTER — TELEPHONE (OUTPATIENT)
Dept: HEMATOLOGY/ONCOLOGY | Facility: CLINIC | Age: 69
End: 2024-01-19
Payer: MEDICARE

## 2024-01-19 ENCOUNTER — LAB VISIT (OUTPATIENT)
Dept: LAB | Facility: HOSPITAL | Age: 69
End: 2024-01-19
Attending: NURSE PRACTITIONER
Payer: MEDICARE

## 2024-01-19 DIAGNOSIS — D50.9 IRON DEFICIENCY ANEMIA, UNSPECIFIED IRON DEFICIENCY ANEMIA TYPE: ICD-10-CM

## 2024-01-19 DIAGNOSIS — D64.9 NORMOCYTIC ANEMIA: ICD-10-CM

## 2024-01-19 LAB
ALBUMIN SERPL BCP-MCNC: 3.5 G/DL (ref 3.5–5.2)
ALP SERPL-CCNC: 86 U/L (ref 55–135)
ALT SERPL W/O P-5'-P-CCNC: 9 U/L (ref 10–44)
ANION GAP SERPL CALC-SCNC: 7 MMOL/L (ref 8–16)
AST SERPL-CCNC: 16 U/L (ref 10–40)
BASOPHILS # BLD AUTO: 0.04 K/UL (ref 0–0.2)
BASOPHILS NFR BLD: 0.8 % (ref 0–1.9)
BILIRUB SERPL-MCNC: 0.3 MG/DL (ref 0.1–1)
BUN SERPL-MCNC: 37 MG/DL (ref 8–23)
CALCIUM SERPL-MCNC: 9.9 MG/DL (ref 8.7–10.5)
CHLORIDE SERPL-SCNC: 98 MMOL/L (ref 95–110)
CO2 SERPL-SCNC: 33 MMOL/L (ref 23–29)
CREAT SERPL-MCNC: 2.3 MG/DL (ref 0.5–1.4)
DIFFERENTIAL METHOD BLD: ABNORMAL
EOSINOPHIL # BLD AUTO: 0.2 K/UL (ref 0–0.5)
EOSINOPHIL NFR BLD: 3 % (ref 0–8)
ERYTHROCYTE [DISTWIDTH] IN BLOOD BY AUTOMATED COUNT: 14.9 % (ref 11.5–14.5)
EST. GFR  (NO RACE VARIABLE): 23 ML/MIN/1.73 M^2
FERRITIN SERPL-MCNC: 461 NG/ML (ref 20–300)
GLUCOSE SERPL-MCNC: 92 MG/DL (ref 70–110)
HCT VFR BLD AUTO: 32.9 % (ref 37–48.5)
HGB BLD-MCNC: 10.3 G/DL (ref 12–16)
IMM GRANULOCYTES # BLD AUTO: 0.02 K/UL (ref 0–0.04)
IMM GRANULOCYTES NFR BLD AUTO: 0.4 % (ref 0–0.5)
IRON SERPL-MCNC: 59 UG/DL (ref 30–160)
LYMPHOCYTES # BLD AUTO: 1.8 K/UL (ref 1–4.8)
LYMPHOCYTES NFR BLD: 34 % (ref 18–48)
MCH RBC QN AUTO: 30.9 PG (ref 27–31)
MCHC RBC AUTO-ENTMCNC: 31.3 G/DL (ref 32–36)
MCV RBC AUTO: 99 FL (ref 82–98)
MONOCYTES # BLD AUTO: 0.4 K/UL (ref 0.3–1)
MONOCYTES NFR BLD: 7.8 % (ref 4–15)
NEUTROPHILS # BLD AUTO: 2.9 K/UL (ref 1.8–7.7)
NEUTROPHILS NFR BLD: 54 % (ref 38–73)
NRBC BLD-RTO: 0 /100 WBC
PLATELET # BLD AUTO: 249 K/UL (ref 150–450)
PMV BLD AUTO: 11.2 FL (ref 9.2–12.9)
POTASSIUM SERPL-SCNC: 4.8 MMOL/L (ref 3.5–5.1)
PROT SERPL-MCNC: 7.3 G/DL (ref 6–8.4)
RBC # BLD AUTO: 3.33 M/UL (ref 4–5.4)
SATURATED IRON: 19 % (ref 20–50)
SODIUM SERPL-SCNC: 138 MMOL/L (ref 136–145)
TOTAL IRON BINDING CAPACITY: 317 UG/DL (ref 250–450)
TRANSFERRIN SERPL-MCNC: 214 MG/DL (ref 200–375)
WBC # BLD AUTO: 5.29 K/UL (ref 3.9–12.7)

## 2024-01-19 PROCEDURE — 80053 COMPREHEN METABOLIC PANEL: CPT | Performed by: NURSE PRACTITIONER

## 2024-01-19 PROCEDURE — 85025 COMPLETE CBC W/AUTO DIFF WBC: CPT | Performed by: NURSE PRACTITIONER

## 2024-01-19 PROCEDURE — 82728 ASSAY OF FERRITIN: CPT | Performed by: NURSE PRACTITIONER

## 2024-01-19 PROCEDURE — 83540 ASSAY OF IRON: CPT | Performed by: NURSE PRACTITIONER

## 2024-01-19 PROCEDURE — 36415 COLL VENOUS BLD VENIPUNCTURE: CPT | Mod: PN | Performed by: NURSE PRACTITIONER

## 2024-01-19 NOTE — TELEPHONE ENCOUNTER
----- Message from Diamone Speed sent at 1/19/2024  3:21 PM CST -----  Regarding: self  Type:  Patient Returning Call         Who Called: self       Who Left Message for Patient: pt said isabelle       Does the patient know what this is regarding?: no        Would the patient rather a call back or a response via [facility]? Call back     Best Call Back Number:[phone]        Additional Information:

## 2024-01-22 ENCOUNTER — OFFICE VISIT (OUTPATIENT)
Dept: HEMATOLOGY/ONCOLOGY | Facility: CLINIC | Age: 69
End: 2024-01-22
Payer: MEDICARE

## 2024-01-22 VITALS
BODY MASS INDEX: 23.98 KG/M2 | HEART RATE: 98 BPM | HEIGHT: 62 IN | SYSTOLIC BLOOD PRESSURE: 100 MMHG | DIASTOLIC BLOOD PRESSURE: 70 MMHG | WEIGHT: 130.31 LBS | OXYGEN SATURATION: 90 %

## 2024-01-22 DIAGNOSIS — D50.9 IRON DEFICIENCY ANEMIA, UNSPECIFIED IRON DEFICIENCY ANEMIA TYPE: Primary | ICD-10-CM

## 2024-01-22 DIAGNOSIS — D51.9 ANEMIA DUE TO VITAMIN B12 DEFICIENCY, UNSPECIFIED B12 DEFICIENCY TYPE: ICD-10-CM

## 2024-01-22 DIAGNOSIS — K74.60 HEPATIC CIRRHOSIS, UNSPECIFIED HEPATIC CIRRHOSIS TYPE: ICD-10-CM

## 2024-01-22 DIAGNOSIS — Z12.11 COLON CANCER SCREENING: ICD-10-CM

## 2024-01-22 DIAGNOSIS — N18.4 ANEMIA DUE TO STAGE 4 CHRONIC KIDNEY DISEASE: ICD-10-CM

## 2024-01-22 DIAGNOSIS — D53.9 MACROCYTIC ANEMIA: ICD-10-CM

## 2024-01-22 DIAGNOSIS — R09.89 CHEST CONGESTION: ICD-10-CM

## 2024-01-22 DIAGNOSIS — D63.1 ANEMIA DUE TO STAGE 4 CHRONIC KIDNEY DISEASE: ICD-10-CM

## 2024-01-22 PROCEDURE — 99215 OFFICE O/P EST HI 40 MIN: CPT | Mod: S$GLB,,, | Performed by: NURSE PRACTITIONER

## 2024-01-22 PROCEDURE — 99999 PR PBB SHADOW E&M-EST. PATIENT-LVL IV: CPT | Mod: PBBFAC,,, | Performed by: NURSE PRACTITIONER

## 2024-01-22 RX ORDER — FERROUS SULFATE 325(65) MG
TABLET ORAL
Qty: 45 TABLET | Refills: 1 | Status: SHIPPED | OUTPATIENT
Start: 2024-01-22

## 2024-01-22 RX ORDER — CYANOCOBALAMIN 1000 UG/ML
1000 INJECTION, SOLUTION INTRAMUSCULAR; SUBCUTANEOUS
Qty: 6 ML | Refills: 1 | Status: SHIPPED | OUTPATIENT
Start: 2024-01-22 | End: 2024-12-18

## 2024-01-22 RX ORDER — GUAIFENESIN 600 MG/1
600 TABLET, EXTENDED RELEASE ORAL 2 TIMES DAILY
Qty: 180 TABLET | Refills: 1 | Status: SHIPPED | OUTPATIENT
Start: 2024-01-22 | End: 2025-01-21

## 2024-01-22 RX ORDER — CYANOCOBALAMIN 1000 UG/ML
1000 INJECTION, SOLUTION INTRAMUSCULAR; SUBCUTANEOUS
Qty: 6 ML | Refills: 1 | Status: SHIPPED | OUTPATIENT
Start: 2024-01-22 | End: 2024-01-22

## 2024-01-22 NOTE — PROGRESS NOTES
Subjective:      Patient ID: Jasmina Sharma is a 68 y.o. female.    Chief Complaint:     HPI:    68 year old female who presents today as a new patient for further evaluation and recommendation of anemia.  Has required blood transfusion in the past for symptomatic anemia.       Other medical diagnosis include severe COPD, acute on chronic diastolic heart failure, HTN, HPLD, CAD, alcohol abuse, portal gastropathy, states that she is on home O2 at night 2 liters.       She presents today with labs from 10/24/2022 showing macrocytic anemia hgb 7.8 mcv 101.  Not currently iron deficient.     She is not up to date with mammogram, colonoscopy, or pap.       Just recently hospitalized for 28 days for respiratory and kidney failure.  Was intubated and confused with increased CO2 and states that she does not remember anything.  Required 3 units of blood after the traumatic intubation/extubation.  States that she had a c diff infection during hospitalization.  She also had a large hematoma on her left inner leg.        Denies any abnormal blood loss.       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  States that she is taking folic acid daily.  States that she has been diagnosed with fatty liver disease.  Last checked about 20 years ago.      States that she used to drink a lot.  Stopped drinking end of 8/2022.  States that she was a heavy smoker.  Says that she smoked a couple of packs since she has been out of the hospital.  Started drinking when she was 56 years old when her ex and her broke up.  Would drink 1-2 bottles and wine and also rum and coke per night.  Per patient, was a very heavy drinker.    Has smoked cigarettes since she was 19 years.  smoking about 2 packs/day.       Cologuard was sent to her in the mail. She completed;however it was unable to be processed      2/23/2023 Mammogram - no evidence of malignancy  Is being followed by pulmonology.  Had CT chest 8/2022.  No evidence  of malignancy.       Denies h/o gastric surgeries in the past.     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     Family hx of cancer:  Father:  stage IV lung cancer - asbestosis     Denies f/c/ns or unintentional weight loss.  Denies any known lymphadenopathy.       Interval History  12/5/2022:  Presents today for f/u results and recommendation on anemia workup.    At last visit recommend continue folic acid supplementation daily and avoidance of etoh.  Abdominal US ordered d/t etoh use 11/16/2022 Impression:  1.  There is a slightly lobular surface to the liver.  Cirrhosis?  2.  Fatty infiltration of the pancreas.  3.  Cholelithiasis with gallbladder sludge, without ultrasound evidence for acute cholecystitis.  4.  Subcentimeter left renal cyst.  5.  Increased echogenicity to the kidneys, concerning for medical renal disease.  Clinical correlation is advised.     Hepatology consult placed for eval, has not seen as of yet.  Reschedule patient.      Noted decreased kidney function.  Patient was recently advised to increase Celebrex usage.  She has since reduced back down to once daily dose.  States that she is going to reschedule mammogram d/t having to have a dentist appointment d/t cracked crown.        2/16/2023  States that has had a cold for 2 months and has been placed on steroid for a month.  Has increased bruising with skin tear to left hand.  States that she would be willing to reschedule with hepatology for evaluation.  Denies urinary infection symptoms currently.  Taking bumex every other day.  States that probably not  drinking enough fluid.  States that she no longer drinks alcohol.  Has normocytic anemia with normal differential.  States that she has an appointment with nephrology upcoming.  Will try to complete cologuard testing and send in per patient.      Interval History:  3/16/2023 Had a fall on Saturday hitting her head.  She cannot remember  "how she fell.  Had right hand tremors.  States the tremors are calmed with "nerve pills".  Says that she feels anxious - uses oxygen at night and sometimes during the day.  Currently 88% on RA. Continues folic acid and prenatal vitamin.       Interval History:  2023 Patient states that she took progesterone pills from 5/ prescribed by her pulmonology - caused breast tenderness and swelling - mostly the left - it has improved since stopping however is still present.     2023 mammogram - negative for malignancy     Has not seen hepatology as of yet. Unable to go [facility].       Hospitalization in the beginning of 2023 d/t COPD exacerbation w/hypercapnia - required intubation-received abx and steroids.  Down to 5 cigarettes/day and some days none.  Denies any abnormal bleeding. Does not eat a well balanced diet.       Is giving her self B12 1000 mcg IM monthly.      Interval History:  2024  Currently taking Celebrex 200 mg PO daily and bumex 1 mg PO daily.  Currently with decreased kidney function and dehydration.  Denies urinary tract infection currently.  She states that she stopped taking her oral iron because of constipation.    Social History     Socioeconomic History    Marital status:    Tobacco Use    Smoking status: Former     Current packs/day: 0.00     Average packs/day: 2.0 packs/day for 49.8 years (99.6 ttl pk-yrs)     Types: Cigarettes, Vaping with nicotine     Start date:      Quit date: 10/22/2023     Years since quittin.2     Passive exposure: Past    Smokeless tobacco: Never    Tobacco comments:     Currently vaping low dose 2.4mcg   Substance and Sexual Activity    Alcohol use: Not Currently    Drug use: Never    Sexual activity: Not Currently     Social Determinants of Health     Financial Resource Strain: Medium Risk (10/31/2023)    Overall Financial Resource Strain (CARDIA)     Difficulty of Paying Living Expenses: Somewhat hard   Food Insecurity: No Food " Insecurity (10/31/2023)    Hunger Vital Sign     Worried About Running Out of Food in the Last Year: Never true     Ran Out of Food in the Last Year: Never true   Transportation Needs: No Transportation Needs (10/31/2023)    PRAPARE - Transportation     Lack of Transportation (Medical): No     Lack of Transportation (Non-Medical): No   Physical Activity: Inactive (10/31/2023)    Exercise Vital Sign     Days of Exercise per Week: 0 days     Minutes of Exercise per Session: 0 min   Stress: No Stress Concern Present (10/31/2023)    [employer] [place] [employer] - Occupational Stress Questionnaire     Feeling of Stress : Not at all   Social Connections: Socially Isolated (10/31/2023)    Social Connection and Isolation Panel [NHANES]     Frequency of Communication with Friends and Family: More than three times a week     Frequency of Social Gatherings with Friends and Family: Twice a week     Attends Worship Services: Never     Active Member of Clubs or Organizations: No     Attends Club or Organization Meetings: Never     Marital Status:    Housing Stability: Low Risk  (10/31/2023)    Housing Stability Vital Sign     Unable to Pay for Housing in the Last Year: No     Number of Places Lived in the Last Year: 1     Unstable Housing in the Last Year: No       Family History   Problem Relation Age of Onset    Hypertension Mother     Memory loss Mother     Hypertension Father     Diabetes Father     Cancer Father        Past Surgical History:   Procedure Laterality Date    AUGMENTATION OF BREAST Bilateral     3 times    BREAST SURGERY         Past Medical History:   Diagnosis Date    COPD (chronic obstructive pulmonary disease)     Encounter for blood transfusion     Hyperlipidemia     Hypertension        Review of Systems   Constitutional: Negative.    HENT: Negative.     Eyes: Negative.    Respiratory: Negative.     Cardiovascular: Negative.    Gastrointestinal: Negative.    Endocrine: Negative.     Genitourinary: Negative.    Musculoskeletal:  Positive for arthralgias and back pain.   Skin: Negative.    Allergic/Immunologic: Negative.    Neurological: Negative.    Hematological:  Bruises/bleeds easily.   Psychiatric/Behavioral: Negative.            Medication List with Changes/Refills   New Medications    GUAIFENESIN (MUCINEX) 600 MG 12 HR TABLET    Take 1 tablet (600 mg total) by mouth 2 (two) times daily.   Current Medications    ALBUTEROL (PROVENTIL/VENTOLIN HFA) 90 MCG/ACTUATION INHALER    Inhale 2 puffs into the lungs every 6 (six) hours as needed.    ALBUTEROL-IPRATROPIUM (DUO-NEB) 2.5 MG-0.5 MG/3 ML NEBULIZER SOLUTION    Take by nebulization every 6 (six) hours as needed.    ALLOPURINOL (ZYLOPRIM) 100 MG TABLET    Take 1 tablet (100 mg total) by mouth once daily.    BUMETANIDE (BUMEX) 1 MG TABLET    Take 0.5 tablets (0.5 mg total) by mouth every other day.    CELECOXIB (CELEBREX) 50 MG CAPSULE    TAKE 1 CAPSULE BY MOUTH 2 TIMES DAILY.    CETIRIZINE (ZYRTEC) 10 MG TABLET    Take 1 tablet (10 mg total) by mouth once daily.    CLONAZEPAM (KLONOPIN) 0.5 MG TABLET    Take 0.25 mg by mouth nightly as needed.    FLUTICASONE PROPIONATE (FLONASE) 50 MCG/ACTUATION NASAL SPRAY    fluticasone propionate 50 mcg/actuation nasal spray,suspension   USE 1 SPRAY IN EACH NOSTRIL DAILY    FOLIC ACID (FOLVITE) 1 MG TABLET    Take 1 tablet (1,000 mcg total) by mouth every morning.    HYDROCODONE-ACETAMINOPHEN (NORCO) 7.5-325 MG PER TABLET    SMARTSI Tablet(s) By Mouth Every 12 Hours PRN    LOSARTAN (COZAAR) 25 MG TABLET    Take 1 tablet (25 mg total) by mouth once daily.    METOPROLOL SUCCINATE (TOPROL-XL) 25 MG 24 HR TABLET    Take 1 tablet (25 mg total) by mouth once daily.    MIRTAZAPINE (REMERON) 15 MG TABLET    Take 1 tablet (15 mg total) by mouth nightly as needed (sleep).    NALOXONE (NARCAN) 4 MG/ACTUATION SPRY    naloxone 4 mg/actuation nasal spray   SPRAY 1 SPRAY INTO ONE NOSTRIL REPEAT ANOTHER INTO OPPOSITE  NOSTRIL IN 2 TO 3 MINUTES UNTIL PT RESPONSIVE OR HELP ARRIVES    PANTOPRAZOLE (PROTONIX) 40 MG TABLET    Take 1 tablet (40 mg total) by mouth once daily.    PRAMIPEXOLE (MIRAPEX) 0.75 MG TABLET    pramipexole 0.75 mg tablet   TAKE 1 TABLET BY MOUTH AT BEDTIME FOR RESTLESS LEGS    PREGABALIN (LYRICA) 50 MG CAPSULE    Take 50 mg by mouth 3 (three) times daily as needed.    ROSUVASTATIN (CRESTOR) 10 MG TABLET    Take 1 tablet (10 mg total) by mouth every evening.    TRELEGY ELLIPTA 100-62.5-25 MCG DSDV    SMARTSI Inhalation Via Inhaler Daily   Changed and/or Refilled Medications    Modified Medication Previous Medication    CYANOCOBALAMIN 1,000 MCG/ML INJECTION cyanocobalamin 1,000 mcg/mL injection       Inject 1 mL (1,000 mcg total) into the skin every 30 days. for 12 doses    Inject 1 mL (1,000 mcg total) into the skin every 30 days. for 6 doses    FERROUS SULFATE (FEOSOL) 325 MG (65 MG IRON) TAB TABLET ferrous sulfate (FEOSOL) 325 mg (65 mg iron) Tab tablet       Take 1 tablet 325 mg PO every other day.    Take 1 tablet (325 mg total) by mouth once daily.        Objective:     Vitals:    24 1535   BP: 100/70   Pulse: 98       Physical Exam  Pulmonary:      Breath sounds: Examination of the left-lower field reveals decreased breath sounds. Decreased breath sounds and wheezing (experatory wheezing) present.         Assessment:     Problem List Items Addressed This Visit          Oncology    Iron deficiency anemia - Primary    Relevant Medications    ferrous sulfate (FEOSOL) 325 mg (65 mg iron) Tab tablet    Other Relevant Orders    CBC Auto Differential    Comprehensive Metabolic Panel    Ferritin    Iron and TIBC    Anemia due to stage 4 chronic kidney disease    Relevant Orders    Comprehensive Metabolic Panel       GI    Hepatic cirrhosis, unspecified hepatic cirrhosis type    Relevant Orders    CBC Auto Differential    Comprehensive Metabolic Panel     Other Visit Diagnoses       Macrocytic anemia         Relevant Orders    CBC Auto Differential    Comprehensive Metabolic Panel    Colon cancer screening        Relevant Orders    Cologuard Screening (Multitarget Stool DNA)    CBC Auto Differential    Comprehensive Metabolic Panel    Anemia due to vitamin B12 deficiency, unspecified B12 deficiency type        Relevant Medications    cyanocobalamin 1,000 mcg/mL injection    Other Relevant Orders    CBC Auto Differential    Chest congestion        Relevant Medications    guaiFENesin (MUCINEX) 600 mg 12 hr tablet            Lab Results   Component Value Date    WBC 5.29 01/19/2024    RBC 3.33 (L) 01/19/2024    HGB 10.3 (L) 01/19/2024    HCT 32.9 (L) 01/19/2024    MCV 99 (H) 01/19/2024    MCH 30.9 01/19/2024    MCHC 31.3 (L) 01/19/2024    RDW 14.9 (H) 01/19/2024     01/19/2024    MPV 11.2 01/19/2024    GRAN 2.9 01/19/2024    GRAN 54.0 01/19/2024    LYMPH 1.8 01/19/2024    LYMPH 34.0 01/19/2024    MONO 0.4 01/19/2024    MONO 7.8 01/19/2024    EOS 0.2 01/19/2024    BASO 0.04 01/19/2024    EOSINOPHIL 3.0 01/19/2024    BASOPHIL 0.8 01/19/2024      Lab Results   Component Value Date     01/19/2024    K 4.8 01/19/2024    CL 98 01/19/2024    CO2 33 (H) 01/19/2024    BUN 37 (H) 01/19/2024    CREATININE 2.3 (H) 01/19/2024    CALCIUM 9.9 01/19/2024    ANIONGAP 7 (L) 01/19/2024    ESTGFRAFRICA 49 05/09/2023     Lab Results   Component Value Date    ALT 9 (L) 01/19/2024    AST 16 01/19/2024    ALKPHOS 86 01/19/2024    BILITOT 0.3 01/19/2024     Lab Results   Component Value Date    IRON 59 01/19/2024    TRANSFERRIN 214 01/19/2024    TIBC 317 01/19/2024    FESATURATED 19 (L) 01/19/2024    FERRITIN 461 (H) 01/19/2024      Lab Results   Component Value Date    QRPAHKXO18 1941 (H) 05/23/2023     Lab Results   Component Value Date    FOLATE 16.7 11/03/2022       Plan:   Iron deficiency anemia, unspecified iron deficiency anemia type  -     ferrous sulfate (FEOSOL) 325 mg (65 mg iron) Tab tablet; Take 1 tablet 325 mg PO every  other day.  Dispense: 45 tablet; Refill: 1  -     CBC Auto Differential; Future; Expected date: 01/22/2024  -     Comprehensive Metabolic Panel; Future; Expected date: 01/22/2024  -     Ferritin; Future; Expected date: 01/22/2024  -     Iron and TIBC; Future; Expected date: 01/22/2024    Hepatic cirrhosis, unspecified hepatic cirrhosis type  -     CBC Auto Differential; Future; Expected date: 01/22/2024  -     Comprehensive Metabolic Panel; Future; Expected date: 01/22/2024    Macrocytic anemia  -     CBC Auto Differential; Future; Expected date: 01/22/2024  -     Comprehensive Metabolic Panel; Future; Expected date: 01/22/2024    Anemia due to stage 4 chronic kidney disease  -     Comprehensive Metabolic Panel; Future; Expected date: 01/22/2024    Colon cancer screening  -     Cologuard Screening (Multitarget Stool DNA); Future; Expected date: 01/22/2024  -     CBC Auto Differential; Future; Expected date: 01/22/2024  -     Comprehensive Metabolic Panel; Future; Expected date: 01/22/2024    Anemia due to vitamin B12 deficiency, unspecified B12 deficiency type  -     Discontinue: cyanocobalamin 1,000 mcg/mL injection; Inject 1 mL (1,000 mcg total) into the skin every 30 days. for 12 doses  Dispense: 6 mL; Refill: 1  -     cyanocobalamin 1,000 mcg/mL injection; Inject 1 mL (1,000 mcg total) into the skin every 30 days. for 12 doses  Dispense: 6 mL; Refill: 1  -     CBC Auto Differential; Future; Expected date: 01/22/2024    Chest congestion  -     guaiFENesin (MUCINEX) 600 mg 12 hr tablet; Take 1 tablet (600 mg total) by mouth 2 (two) times daily.  Dispense: 180 tablet; Refill: 1    Other orders  -     epoetin chano-epbx injection 10,000 Units    Restart ferrous sulfate 325 mg PO every other day.  Will start treatment with retacrit 10,0000 units every 2 weeks for hgb <10 g/dL as long as saturated iron is 20% or greater.        Med Onc Chart Routing      Follow up with physician    Follow up with KEITH 1 month. f/u in 1  month with labs prior - in person Rosalia gong late appointments   Infusion scheduling note   n/a   Injection scheduling note Retacrit 10,000 units if hgb < 10   Labs   Scheduling:  Preferred lab: [facility]  Lab interval:  cbc, cmp, iron studies   Imaging   N/a   Pharmacy appointment No pharmacy appointment needed      Other referrals       No additional referrals needed  n/a         Continue monthly B12 1000 mcg injections monthly.  Restart ferrous sulfate 325 mg PO every other day.     Repeat Cologuard testing.      Start mucinex 600 mg PO every 12 hours.     Total time spent on encounter:60 minutes    Collaborating Provider:  Dr. Jose Beltran    Thank You,  EDILBERTO Siddiqi-GEN  [facility] H[facility]ology

## 2024-02-05 DIAGNOSIS — F41.9 ANXIETY: ICD-10-CM

## 2024-02-05 PROBLEM — J96.11 CHRONIC RESPIRATORY FAILURE WITH HYPOXIA: Status: RESOLVED | Noted: 2023-10-31 | Resolved: 2024-02-05

## 2024-02-05 RX ORDER — CITALOPRAM 40 MG/1
TABLET, FILM COATED ORAL
Qty: 90 TABLET | Refills: 3 | OUTPATIENT
Start: 2024-02-05

## 2024-02-05 NOTE — TELEPHONE ENCOUNTER
Care Due:                  Date            Visit Type   Department     Provider  --------------------------------------------------------------------------------                                EP -                              PRIMARY      [facility] PRIMARY  Last Visit: 12-      CARE ([facility])   MAYURI Ortiz                              EP -                              PRIMARY      [facility] PRIMARY  Next Visit: 06-      CARE ([facility])   MAYURI Ortiz                                                            Last  Test          Frequency    Reason                     Performed    Due Date  --------------------------------------------------------------------------------    Lipid Panel.  12 months..  rosuvastatin.............  10-   10-    Uric Acid...  12 months..  allopurinoL..............  Not Found    Overdue    [employer] Embedded Care Due Messages. Reference number: [id number].   2/05/2024 3:34:03 PM CST

## 2024-02-06 NOTE — TELEPHONE ENCOUNTER
Provider Staff:  Action required for this patient    Requires labs      Please see care gap opportunities below in Care Due Message.    Thanks!  [facility]     Appointments      Date Provider   Last Visit   12/7/2023 Brock Ortiz MD   Next Visit   6/7/2024 Brock Ortiz MD     Refill Decision Note   Jasmina Sharma  is requesting a refill authorization.  Brief Assessment and Rationale for Refill:  Quick Discontinue     Medication Therapy Plan:  The original prescription was discontinued on 3/1/2023 by Brock Ortiz MD for the following reason: Therapy completed.      Comments:     Note composed:8:09 PM 02/05/2024             Appointments     Last Visit   12/7/2023 Brock Ortiz MD   Next Visit   6/7/2024 Brock Ortiz MD           Appointments     Last Visit   12/7/2023 Brock Ortiz MD   Next Visit   6/7/2024 Brock Ortiz MD

## 2024-02-14 ENCOUNTER — TELEPHONE (OUTPATIENT)
Dept: HEMATOLOGY/ONCOLOGY | Facility: CLINIC | Age: 69
End: 2024-02-14
Payer: MEDICARE

## 2024-02-14 NOTE — TELEPHONE ENCOUNTER
Nurse spoke with pt in regards to her concerns about the retacrit injection.  Nurse was able to answer all of the pts questions and concerns. Pt verbalized understanding. Call ended well.

## 2024-02-14 NOTE — TELEPHONE ENCOUNTER
----- Message from Reid Magana sent at 2/14/2024  9:55 AM CST -----  Contact: self  Pt is asking for an return call in reference to needing information on injection that she is suppose to start receiving , please call back at .[phone] Thx CJ

## 2024-02-15 ENCOUNTER — TELEPHONE (OUTPATIENT)
Dept: PRIMARY CARE CLINIC | Facility: CLINIC | Age: 69
End: 2024-02-15
Payer: MEDICARE

## 2024-02-15 ENCOUNTER — OFFICE VISIT (OUTPATIENT)
Dept: PRIMARY CARE CLINIC | Facility: CLINIC | Age: 69
End: 2024-02-15
Payer: MEDICARE

## 2024-02-15 DIAGNOSIS — I10 ESSENTIAL HYPERTENSION: ICD-10-CM

## 2024-02-15 DIAGNOSIS — E78.2 MIXED HYPERLIPIDEMIA: ICD-10-CM

## 2024-02-15 DIAGNOSIS — F41.9 ANXIETY: Primary | ICD-10-CM

## 2024-02-15 DIAGNOSIS — D63.1 ANEMIA DUE TO STAGE 4 CHRONIC KIDNEY DISEASE: ICD-10-CM

## 2024-02-15 DIAGNOSIS — G89.4 CHRONIC PAIN SYNDROME: ICD-10-CM

## 2024-02-15 DIAGNOSIS — N18.4 ANEMIA DUE TO STAGE 4 CHRONIC KIDNEY DISEASE: ICD-10-CM

## 2024-02-15 DIAGNOSIS — K74.60 HEPATIC CIRRHOSIS, UNSPECIFIED HEPATIC CIRRHOSIS TYPE: ICD-10-CM

## 2024-02-15 DIAGNOSIS — N18.4 CKD (CHRONIC KIDNEY DISEASE) STAGE 4, GFR 15-29 ML/MIN: ICD-10-CM

## 2024-02-15 PROBLEM — I50.32 CHRONIC DIASTOLIC HEART FAILURE: Status: RESOLVED | Noted: 2022-04-03 | Resolved: 2024-02-15

## 2024-02-15 PROBLEM — I70.0 AORTIC ATHEROSCLEROSIS: Status: RESOLVED | Noted: 2023-10-31 | Resolved: 2024-02-15

## 2024-02-15 PROBLEM — J41.8 MIXED SIMPLE AND MUCOPURULENT CHRONIC BRONCHITIS: Status: RESOLVED | Noted: 2023-10-31 | Resolved: 2024-02-15

## 2024-02-15 PROBLEM — I25.119 CORONARY ARTERY DISEASE INVOLVING NATIVE CORONARY ARTERY OF NATIVE HEART WITH ANGINA PECTORIS: Status: RESOLVED | Noted: 2022-10-27 | Resolved: 2024-02-15

## 2024-02-15 PROBLEM — J44.9 SEVERE CHRONIC OBSTRUCTIVE PULMONARY DISEASE: Status: RESOLVED | Noted: 2018-08-27 | Resolved: 2024-02-15

## 2024-02-15 PROBLEM — D69.2 SENILE PURPURA: Status: RESOLVED | Noted: 2023-11-02 | Resolved: 2024-02-15

## 2024-02-15 PROCEDURE — 99443 PR PHYSICIAN TELEPHONE EVALUATION 21-30 MIN: CPT | Mod: 95,,, | Performed by: FAMILY MEDICINE

## 2024-02-15 RX ORDER — CITALOPRAM 40 MG/1
40 TABLET, FILM COATED ORAL DAILY
Qty: 90 TABLET | Refills: 3 | Status: SHIPPED | OUTPATIENT
Start: 2024-02-15 | End: 2025-02-14

## 2024-02-15 NOTE — TELEPHONE ENCOUNTER
----- Message from Andreina Malloy sent at 2/15/2024 11:11 AM CST -----  Pt would like to be scheduled for a med refill.Please call back at  [phone]

## 2024-02-15 NOTE — PROGRESS NOTES
"    [facility]       [address] [place] [address]      Phone: [phone]      Fax: [phone]    Brock Ortiz MD                Virtual Visit  02/15/2024        Subjective      HPI:  Jasmina Sharma is a 68 y.o. female presents today via video for "No chief complaint on file.  ."     68-year-old female presents today to checkup on multiple issues.      Needs refills of some of her medicines.  Specifically, Celexa.    Patient states that overall she is doing okay today.  No chest pain, shortness O breath.  No fever, chills, body aches.  No coughing, sneezing, URI type symptoms.  Appetite normal.  Bowel movements normal.  No urinary issues.    States that her joints hurt all over.  Goes to pain management.  They have her on pain pills, Robaxin.  Had to stop taking Celebrex because her kidney function was declining.    Has anemia.  Following with Hematology.  They have her taking iron tablets.  She has been doing B12 injections in the past.  They think that her anemia is from her CKD and would like her to start on Procrit injections to help with her blood counts.  She is willing, but would prefer it to be done here, in [facility], rather than in [place].      As above, has CKD 4.  [facility].  Has an appointment with NP in a couple of weeks.  He wants her avoiding NSAIDs, which is why she stopped the Celebrex.    She also reports that she might have cirrhosis.  States that an ultrasound of her abdomen done previously showed a nodularity to the liver.  She is supposed to be following up with GI to get this addressed.    Has hypertension.  Currently takes losartan 25 mg daily.  No issues with this medication.      Has CHF.  Uses Bumex every other day for leg swelling.  Takes Toprol-XL 25 mg daily.    Has COPD.  Uses Trelegy inhaler daily.  Also uses albuterol inhaler, as needed.  Has nebulizer at home to do " WilfridooNebs.  Supplements with Zyrtec daily.  Follows regularly with her pulmonologist, Dr. Sun.  States that she sees him every month.  Dr. Sun writes her Klonopin.    Has chronic anxiety.  Takes 1/2 tablet of Klonopin, twice a day.  Also takes Celexa 40 mg daily.    Has elevated cholesterol levels.  Takes Crestor 10 mg daily for this.    Occasionally has sleep issues.  Uses Remeron 15 mg, nightly, to help with sleep.    Occasional episodes of GERD.  Uses Protonix 40 mg daily.    Occasionally has issues with restless legs.  Uses Mirapex, as needed.    Has chronic neuropathy.  Follows with Pain Management, Dr. BRIAN Dominguez.  He has her on Lyrica.  Also prescribes her Norco, Celebrex.    Had issues with alcohol abuse in the past.  Takes folic acid 1 mg every morning.    PMH: HTN, HLD, CAD, CHF, COPD, chronic neuropathy.    PSH: Multiple cosmetic procedures.    F MH: CAD, DM, lung cancer, liver cancer   Allergies:  Amoxicillin causes itching, rash.  Scopolamine causes itching, rash.  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  Lives alone.    T: Denies current use.  Quit in , when went into the hospital.  Previously, two packs per day x 40+ years  A:  Previously daily.  Quit drinking.    D:  Denies     Exercise:  No regular exercise program.  Occasionally gardens.  Gets short of breath with activity.      Pain management:  Dr. BRIAN Dominguez   Pulmonology:  Dr. Sun   Cardiology:  Dr. Taylor     [id number]   Colon: None        The following were updated and reviewed by myself in the chart: medications, past medical history, past surgical history, family history, social history, and allergies.     Medications:  Current Outpatient Medications on File Prior to Visit   Medication Sig Dispense Refill    albuterol (PROVENTIL/VENTOLIN HFA) 90 mcg/actuation inhaler Inhale 2 puffs into the lungs every 6 (six) hours as needed.      albuterol-ipratropium (DUO-NEB) 2.5 mg-0.5 mg/3 mL nebulizer solution Take by  nebulization every 6 (six) hours as needed.      allopurinoL (ZYLOPRIM) 100 MG tablet Take 1 tablet (100 mg total) by mouth once daily. 90 tablet 3    bumetanide (BUMEX) 1 MG tablet Take 0.5 tablets (0.5 mg total) by mouth every other day. 45 tablet 3    cetirizine (ZYRTEC) 10 MG tablet Take 1 tablet (10 mg total) by mouth once daily. 90 tablet 3    clonazePAM (KLONOPIN) 0.5 MG tablet Take 0.25 mg by mouth nightly as needed.      cyanocobalamin 1,000 mcg/mL injection Inject 1 mL (1,000 mcg total) into the skin every 30 days. for 12 doses 6 mL 1    ferrous sulfate (FEOSOL) 325 mg (65 mg iron) Tab tablet Take 1 tablet 325 mg PO every other day. 45 tablet 1    fluticasone propionate (FLONASE) 50 mcg/actuation nasal spray fluticasone propionate 50 mcg/actuation nasal spray,suspension   USE 1 SPRAY IN EACH NOSTRIL DAILY      folic acid (FOLVITE) 1 MG tablet Take 1 tablet (1,000 mcg total) by mouth every morning. 90 tablet 3    guaiFENesin (MUCINEX) 600 mg 12 hr tablet Take 1 tablet (600 mg total) by mouth 2 (two) times daily. 180 tablet 1    HYDROcodone-acetaminophen (NORCO) 7.5-325 mg per tablet SMARTSI Tablet(s) By Mouth Every 12 Hours PRN      losartan (COZAAR) 25 MG tablet Take 1 tablet (25 mg total) by mouth once daily. 90 tablet 3    metoprolol succinate (TOPROL-XL) 25 MG 24 hr tablet Take 1 tablet (25 mg total) by mouth once daily. 90 tablet 3    mirtazapine (REMERON) 15 MG tablet Take 1 tablet (15 mg total) by mouth nightly as needed (sleep). 90 tablet 3    naloxone (NARCAN) 4 mg/actuation Spry naloxone 4 mg/actuation nasal spray   SPRAY 1 SPRAY INTO ONE NOSTRIL REPEAT ANOTHER INTO OPPOSITE NOSTRIL IN 2 TO 3 MINUTES UNTIL PT RESPONSIVE OR HELP ARRIVES      pantoprazole (PROTONIX) 40 MG tablet Take 1 tablet (40 mg total) by mouth once daily. 90 tablet 3    pramipexole (MIRAPEX) 0.75 MG tablet pramipexole 0.75 mg tablet   TAKE 1 TABLET BY MOUTH AT BEDTIME FOR RESTLESS LEGS      pregabalin (LYRICA) 50 MG capsule  Take 50 mg by mouth 3 (three) times daily as needed.      rosuvastatin (CRESTOR) 10 MG tablet Take 1 tablet (10 mg total) by mouth every evening. 90 tablet 3    TRELEGY ELLIPTA 100-62.5-25 mcg DsDv SMARTSI Inhalation Via Inhaler Daily      [DISCONTINUED] celecoxib (CELEBREX) 50 MG capsule TAKE 1 CAPSULE BY MOUTH 2 TIMES DAILY. 60 capsule 0     No current facility-administered medications on file prior to visit.        PMHx:  Past Medical History:   Diagnosis Date    COPD (chronic obstructive pulmonary disease)     Encounter for blood transfusion     Hyperlipidemia     Hypertension       Patient Active Problem List    Diagnosis Date Noted    Anemia due to stage 4 chronic kidney disease 2024    Tobacco use 2023    Idiopathic progressive neuropathy 10/31/2023    Chronic pain syndrome 10/31/2023    Normocytic anemia 10/31/2023    Iron deficiency anemia 10/31/2023    Hepatic cirrhosis, unspecified hepatic cirrhosis type 2023    Stage 3a chronic kidney disease 2023    Hyperlipidemia 2019    Essential hypertension 2016        PSHx:  Past Surgical History:   Procedure Laterality Date    AUGMENTATION OF BREAST Bilateral     3 times    BREAST SURGERY          FHx:  Family History   Problem Relation Age of Onset    Hypertension Mother     Memory loss Mother     Hypertension Father     Diabetes Father     Cancer Father         Social:  Social History     Socioeconomic History    Marital status:    Tobacco Use    Smoking status: Former     Current packs/day: 0.00     Average packs/day: 2.0 packs/day for 49.8 years (99.6 ttl pk-yrs)     Types: Cigarettes, Vaping with nicotine     Start date:      Quit date: 10/22/2023     Years since quittin.3     Passive exposure: Past    Smokeless tobacco: Never    Tobacco comments:     Currently vaping low dose 2.4mcg   Substance and Sexual Activity    Alcohol use: Not Currently    Drug use: Never    Sexual activity: Not Currently  "    Social Determinants of Health     Financial Resource Strain: Medium Risk (10/31/2023)    Overall Financial Resource Strain (CARDIA)     Difficulty of Paying Living Expenses: Somewhat hard   Food Insecurity: No Food Insecurity (10/31/2023)    Hunger Vital Sign     Worried About Running Out of Food in the Last Year: Never true     Ran Out of Food in the Last Year: Never true   Transportation Needs: No Transportation Needs (10/31/2023)    PRAPARE - Transportation     Lack of Transportation (Medical): No     Lack of Transportation (Non-Medical): No   Physical Activity: Inactive (10/31/2023)    Exercise Vital Sign     Days of Exercise per Week: 0 days     Minutes of Exercise per Session: 0 min   Stress: No Stress Concern Present (10/31/2023)    [employer] - Occupational Stress Questionnaire     Feeling of Stress : Not at all   Social Connections: Socially Isolated (10/31/2023)    Social Connection and Isolation Panel [NHANES]     Frequency of Communication with Friends and Family: More than three times a week     Frequency of Social Gatherings with Friends and Family: Twice a week     Attends Gnosticism Services: Never     Active Member of Clubs or Organizations: No     Attends Club or Organization Meetings: Never     Marital Status:    Housing Stability: Low Risk  (10/31/2023)    Housing Stability Vital Sign     Unable to Pay for Housing in the Last Year: No     Number of Places Lived in the Last Year: 1     Unstable Housing in the Last Year: No        Allergies:  Review of patient's allergies indicates:   Allergen Reactions    Amoxicillin Hives, Itching and Rash    Scopolamine Itching        ROS:  Review of Systems   Constitutional:  Positive for activity change and fatigue.   Musculoskeletal:  Positive for arthralgias.          Objective      There were no vitals taken for this visit.  Ht Readings from Last 3 Encounters:   01/22/24 5' 2" (1.575 m)   12/07/23 5' 2" (1.575 m) " "  10/31/23 5' 2" (1.575 m)     Wt Readings from Last 3 Encounters:   01/22/24 59.1 kg (130 lb 4.7 oz)   12/07/23 56.7 kg (125 lb)   10/31/23 55.3 kg (122 lb)           LABS / IMAGING:  Recent Results (from the past 4368 hour(s))   B-TYPE NATRIURETIC PEPTIDE    Collection Time: 12/26/23  1:25 AM   Result Value Ref Range    BNP 70 15 - 159 PG/ML   TROPONIN    Collection Time: 12/26/23  1:25 AM   Result Value Ref Range    Troponin I <0.01 0.00 - 0.03 ng/mL   CBC Auto Differential    Collection Time: 01/19/24 12:09 PM   Result Value Ref Range    WBC 5.29 3.90 - 12.70 K/uL    RBC 3.33 (L) 4.00 - 5.40 M/uL    Hemoglobin 10.3 (L) 12.0 - 16.0 g/dL    Hematocrit 32.9 (L) 37.0 - 48.5 %    MCV 99 (H) 82 - 98 fL    MCH 30.9 27.0 - 31.0 pg    MCHC 31.3 (L) 32.0 - 36.0 g/dL    RDW 14.9 (H) 11.5 - 14.5 %    Platelets 249 150 - 450 K/uL    MPV 11.2 9.2 - 12.9 fL    Immature Granulocytes 0.4 0.0 - 0.5 %    Gran # (ANC) 2.9 1.8 - 7.7 K/uL    Immature Grans (Abs) 0.02 0.00 - 0.04 K/uL    Lymph # 1.8 1.0 - 4.8 K/uL    Mono # 0.4 0.3 - 1.0 K/uL    Eos # 0.2 0.0 - 0.5 K/uL    Baso # 0.04 0.00 - 0.20 K/uL    nRBC 0 0 /100 WBC    Gran % 54.0 38.0 - 73.0 %    Lymph % 34.0 18.0 - 48.0 %    Mono % 7.8 4.0 - 15.0 %    Eosinophil % 3.0 0.0 - 8.0 %    Basophil % 0.8 0.0 - 1.9 %    Differential Method Automated    Comprehensive Metabolic Panel    Collection Time: 01/19/24 12:09 PM   Result Value Ref Range    Sodium 138 136 - 145 mmol/L    Potassium 4.8 3.5 - 5.1 mmol/L    Chloride 98 95 - 110 mmol/L    CO2 33 (H) 23 - 29 mmol/L    Glucose 92 70 - 110 mg/dL    BUN 37 (H) 8 - 23 mg/dL    Creatinine 2.3 (H) 0.5 - 1.4 mg/dL    Calcium 9.9 8.7 - 10.5 mg/dL    Total Protein 7.3 6.0 - 8.4 g/dL    Albumin 3.5 3.5 - 5.2 g/dL    Total Bilirubin 0.3 0.1 - 1.0 mg/dL    Alkaline Phosphatase 86 55 - 135 U/L    AST 16 10 - 40 U/L    ALT 9 (L) 10 - 44 U/L    eGFR 23 (A) >60 mL/min/1.73 m^2    Anion Gap 7 (L) 8 - 16 mmol/L   Ferritin    Collection Time: 01/19/24 " 12:09 PM   Result Value Ref Range    Ferritin 461 (H) 20.0 - 300.0 ng/mL   Iron and TIBC    Collection Time: 01/19/24 12:09 PM   Result Value Ref Range    Iron 59 30 - 160 ug/dL    Transferrin 214 200 - 375 mg/dL    TIBC 317 250 - 450 ug/dL    Saturated Iron 19 (L) 20 - 50 %         Assessment    1. Anxiety    2. CKD (chronic kidney disease) stage 4, GFR 15-29 ml/min    3. Anemia due to stage 4 chronic kidney disease    4. Hepatic cirrhosis, unspecified hepatic cirrhosis type    5. Essential hypertension    6. Mixed hyperlipidemia    7. Chronic pain syndrome          Plan    Diagnoses and all orders for this visit:    Anxiety    CKD (chronic kidney disease) stage 4, GFR 15-29 ml/min    Anemia due to stage 4 chronic kidney disease    Hepatic cirrhosis, unspecified hepatic cirrhosis type    Essential hypertension    Mixed hyperlipidemia    Chronic pain syndrome      Physically, she sounds like she is doing okay.      Refill of Celexa sent to the pharmacy.    Continue to avoid NSAIDs.  Keep follow-up appointments with Nephrology.      She has an appointment with PABLO Palomino, to address the liver.    Continue to follow with pain management, as scheduled.    FOLLOW-UP:  Follow up in about 6 months (around 8/15/2024) for check up.    The patient location is:  [place]  The chief complaint leading to consultation is:  Med refills    Visit type: audio only    Face to Face time with patient: 22 minutes    35 minutes of total time spent on the encounter, which includes face to face time and non-face to face time preparing to see the patient (eg, review of tests), Obtaining and/or reviewing separately obtained history, Documenting clinical information in the electronic or other health record, Independently interpreting results (not separately reported) and communicating results to the patient/family/caregiver, or Care coordination (not separately reported). Visit today included increased complexity associated with the care  of the episodic problem addressed and managing the longitudinal care of the patient due to the serious and/or complex managed problem(s).    Each patient to whom he or she provides medical services by telemedicine is:  (1) informed of the relationship between the physician and patient and the respective role of any other health care provider with respect to management of the patient; and (2) notified that he or she may decline to receive medical services by telemedicine and may withdraw from such care at any time.    Signed by:  Brock Ortiz MD

## 2024-02-26 ENCOUNTER — HOSPITAL ENCOUNTER (OUTPATIENT)
Dept: RADIOLOGY | Facility: HOSPITAL | Age: 69
Discharge: HOME | End: 2024-02-26
Attending: NURSE PRACTITIONER
Payer: MEDICARE

## 2024-02-26 ENCOUNTER — TELEPHONE (OUTPATIENT)
Dept: HEMATOLOGY/ONCOLOGY | Facility: CLINIC | Age: 69
End: 2024-02-26
Payer: MEDICARE

## 2024-02-26 DIAGNOSIS — F17.210 NICOTINE DEPENDENCE, CIGARETTES, UNCOMPLICATED: ICD-10-CM

## 2024-02-26 DIAGNOSIS — Z72.0 TOBACCO USE: ICD-10-CM

## 2024-02-26 PROCEDURE — 71271 CT THORAX LUNG CANCER SCR C-: CPT | Mod: 26,,, | Performed by: RADIOLOGY

## 2024-02-26 PROCEDURE — 71271 CT THORAX LUNG CANCER SCR C-: CPT | Mod: TC,PN

## 2024-02-26 NOTE — TELEPHONE ENCOUNTER
N/a nurse lvm for pt to call back in regards to confirming she would be comming for her lab and ct scan.

## 2024-02-26 NOTE — TELEPHONE ENCOUNTER
----- Message from Lore Guzman NP sent at 2/26/2024 10:36 AM CST -----  Please ask her to do her labs early and if her hgb is > 10 then she does not need to be seen unless she would like to.  We can repeat labs in 2 months and if she is < 10 she will start her retacrit.     Lore Rincon'

## 2024-02-27 LAB — NONINV COLON CA DNA+OCC BLD SCRN STL QL: POSITIVE

## 2024-02-28 ENCOUNTER — PATIENT MESSAGE (OUTPATIENT)
Dept: HEMATOLOGY/ONCOLOGY | Facility: CLINIC | Age: 69
End: 2024-02-28
Payer: MEDICARE

## 2024-02-28 DIAGNOSIS — R30.0 DYSURIA: ICD-10-CM

## 2024-02-28 DIAGNOSIS — Z12.31 OTHER SCREENING MAMMOGRAM: ICD-10-CM

## 2024-02-28 DIAGNOSIS — R19.5 POSITIVE COLORECTAL CANCER SCREENING USING COLOGUARD TEST: Primary | ICD-10-CM

## 2024-02-28 DIAGNOSIS — N28.9 DECREASED RENAL FUNCTION: Primary | ICD-10-CM

## 2024-02-29 ENCOUNTER — TELEPHONE (OUTPATIENT)
Dept: HEMATOLOGY/ONCOLOGY | Facility: CLINIC | Age: 69
End: 2024-02-29
Payer: MEDICARE

## 2024-02-29 ENCOUNTER — TELEPHONE (OUTPATIENT)
Dept: HOME HEALTH SERVICES | Facility: CLINIC | Age: 69
End: 2024-02-29
Payer: MEDICARE

## 2024-02-29 DIAGNOSIS — R91.1 PULMONARY NODULE: Primary | ICD-10-CM

## 2024-02-29 NOTE — TELEPHONE ENCOUNTER
----- Message from Lore Guzman NP sent at 2/28/2024  5:11 PM CST -----  Can we please schedule patient for repeat labs and urinalysis with reflex culture at [place] or [place] tomorrow and reach out to her to let her know.    Cbc, cmp, UA with reflex culture.  Please tell her to increase her water intake.    Thank you,   Sundar Guzman - FNP -C  Benign Hematology

## 2024-02-29 NOTE — TELEPHONE ENCOUNTER
Called pt. Reviewed ldct lung screen from 2/26/24. Recommended 6 month follow up LDCT on 6mm pulmonary nodule. Order placed. Will get scheduled.

## 2024-03-01 ENCOUNTER — TELEPHONE (OUTPATIENT)
Dept: HEMATOLOGY/ONCOLOGY | Facility: CLINIC | Age: 69
End: 2024-03-01
Payer: MEDICARE

## 2024-03-01 ENCOUNTER — PATIENT MESSAGE (OUTPATIENT)
Dept: HEMATOLOGY/ONCOLOGY | Facility: CLINIC | Age: 69
End: 2024-03-01
Payer: MEDICARE

## 2024-03-01 ENCOUNTER — TELEPHONE (OUTPATIENT)
Dept: ADMINISTRATIVE | Facility: CLINIC | Age: 69
End: 2024-03-01
Payer: MEDICARE

## 2024-03-01 NOTE — TELEPHONE ENCOUNTER
----- Message from EDILBERTO Vargas sent at 2/29/2024  4:38 PM CST -----  Please call pt to schedule 6 month follow up low dose ct lung screen around 8/26/2024. Thank you

## 2024-03-01 NOTE — TELEPHONE ENCOUNTER
Nurse spoke with pt in regards to  completing a urinalysis and labs. Pt stated she would like to know about her colar guard testing and would like for the provider to give her a call to go over her labs. Nurse verbalized understanding. Message has been sent to provider to advise.

## 2024-03-01 NOTE — TELEPHONE ENCOUNTER
----- Message from Isaiah Wheat sent at 3/1/2024  8:41 AM CST -----  Regarding: colonoscopy  Called patient to schedule colonoscopy, patient stated no one has called her with results from her cologuard test and is requesting for NP to give her a call, she also stated she wanted to have procedure done at [facility] and want those orders sent over there, please give patient a call she want to know what the results are    Thanks  EL

## 2024-03-01 NOTE — TELEPHONE ENCOUNTER
Called pt; no answer; left message informing pt I was calling to schedule her an appt for her 6 month follow up low dose ct lung screen around 8/26/2024 per provider's message and to return my call; left my name and number

## 2024-03-06 ENCOUNTER — TELEPHONE (OUTPATIENT)
Dept: ADMINISTRATIVE | Facility: CLINIC | Age: 69
End: 2024-03-06
Payer: MEDICARE

## 2024-03-08 ENCOUNTER — TELEPHONE (OUTPATIENT)
Dept: ADMINISTRATIVE | Facility: CLINIC | Age: 69
End: 2024-03-08
Payer: MEDICARE

## 2024-03-08 NOTE — TELEPHONE ENCOUNTER
Called pt; no answer; left message informing pt I was calling to schedule her an appt for her LDCT lung screen around 8/26/24 per provider's message and to return my call; left my name and number

## 2024-03-28 ENCOUNTER — PATIENT OUTREACH (OUTPATIENT)
Dept: ADMINISTRATIVE | Facility: CLINIC | Age: 69
End: 2024-03-28
Payer: MEDICARE

## 2024-03-28 ENCOUNTER — PATIENT MESSAGE (OUTPATIENT)
Dept: ADMINISTRATIVE | Facility: CLINIC | Age: 69
End: 2024-03-28
Payer: MEDICARE

## 2024-03-28 NOTE — PROGRESS NOTES
C3 nurse attempted to contact Jasmina Sharma for a TCC post hospital discharge follow up call. No answer. No voicemail available.The patient does not have a scheduled HOSFU appointment. Message sent to PCP staff for assistance with scheduling visit with patient.

## 2024-04-01 NOTE — PROGRESS NOTES
3rd attempt-C3 nurse attempted to contact Jasmina Sharma for a TCC post hospital discharge follow up call. No answer. No voicemail available.The patient does not have a scheduled HOSFU appointment. Message  previously sent to PCP staff for assistance with scheduling visit with patient.

## 2024-04-01 NOTE — PROGRESS NOTES
C3 nurse attempted to contact Jasmina Sharma for a TCC post hospital discharge follow up call. No answer. No voicemail available.The patient does not have a scheduled HOSFU appointment. Message  previously sent to PCP staff for assistance with scheduling visit with patient.

## 2024-04-02 NOTE — TELEPHONE ENCOUNTER
----- Message from EDILBERTO Vargas sent at 10/31/2023  1:49 PM CDT -----  Please call pt to schedule her dexa scan. She is also due for cologuard. She should be calling lab or Dr. Guzman. I also ordered a LDCT lung screen.   Thank you      
LVM to schedule and gave phone number to call to schedule, bernice.  
[FreeTextEntry1] : Follow up PRN

## 2024-04-22 ENCOUNTER — TELEPHONE (OUTPATIENT)
Dept: PRIMARY CARE CLINIC | Facility: CLINIC | Age: 69
End: 2024-04-22
Payer: MEDICARE

## 2024-04-22 DIAGNOSIS — S22.39XD CLOSED FRACTURE OF ONE RIB WITH ROUTINE HEALING, UNSPECIFIED LATERALITY, SUBSEQUENT ENCOUNTER: ICD-10-CM
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
## 2024-04-22 DIAGNOSIS — T14.8XXA MULTIPLE SKIN TEARS: Primary | ICD-10-CM
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
## 2024-04-22 NOTE — TELEPHONE ENCOUNTER
----- Message from Jasmina Coughlin sent at 4/20/2024  8:48 AM CDT -----  Regarding: Request Call  Patient stated that she would like you to call her  @ [phone] to set up Home Health.. Thank you in Advance
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
## 2024-04-23 ENCOUNTER — TELEPHONE (OUTPATIENT)
Dept: PRIMARY CARE CLINIC | Facility: CLINIC | Age: 69
End: 2024-04-23
Payer: MEDICARE

## 2024-04-23 NOTE — TELEPHONE ENCOUNTER
----- Message from Mona Calderon sent at 4/23/2024 11:57 AM CDT -----  Type:  Needs Medical Advice    Who Called:  Pt's daughter Raymon    Would the patient rather a call back or a response via [facility]?  Call back    Best Call Back Number:  [phone]    Additional Information:  Raymon is reaching out to see what can be done to further along the process to get it approved for home health.  Please call back to advise. Thanks!

## 2024-04-23 NOTE — TELEPHONE ENCOUNTER
Spoke with pt and advised her that home health should get in contact with her today. Pt verbalized understanding

## 2024-04-24 ENCOUNTER — TELEPHONE (OUTPATIENT)
Dept: PRIMARY CARE CLINIC | Facility: CLINIC | Age: 69
End: 2024-04-24
Payer: MEDICARE

## 2024-04-24 ENCOUNTER — ON-DEMAND VIRTUAL (OUTPATIENT)
Dept: URGENT CARE | Facility: CLINIC | Age: 69
End: 2024-04-24
Payer: MEDICARE

## 2024-04-24 DIAGNOSIS — R21 RASH: Primary | ICD-10-CM

## 2024-04-24 PROCEDURE — G0180 MD CERTIFICATION HHA PATIENT: HCPCS | Mod: ,,, | Performed by: FAMILY MEDICINE

## 2024-04-24 PROCEDURE — 99203 OFFICE O/P NEW LOW 30 MIN: CPT | Mod: 95,,, | Performed by: NURSE PRACTITIONER

## 2024-04-24 RX ORDER — TRIAMCINOLONE ACETONIDE 0.25 MG/G
OINTMENT TOPICAL 2 TIMES DAILY
Qty: 30 G | Refills: 0 | Status: SHIPPED | OUTPATIENT
Start: 2024-04-24
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
## 2024-04-24 NOTE — TELEPHONE ENCOUNTER
Called and spoke with Leidy. Informed her to do a verbal order with [facility] per Dr. Ortiz. Nurse verbalized and acknowledge

## 2024-04-24 NOTE — TELEPHONE ENCOUNTER
----- Message from Constance Ferrell sent at 4/24/2024 11:11 AM CDT -----  Contact: Leidy with [facility] phone [phone]  Calling to speak with the nurse regarding the initial [facility] visit today. Please call her. Thanks.

## 2024-04-24 NOTE — PATIENT INSTRUCTIONS
Recommendations:     . Over the counter, as directed, Zyrtec, Claritin or Allegra for daytime use. Benadryl is ok to take at night. Pepcid has also been found to help with allergic reactions. You can take as directed.     . Calamine lotion, Oatmeal baths, or cool compresses may be soothing to relieve the itching.     . Keep skin moisturized as well with a mild lotion. Dryness may worsen the itching.
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
## 2024-04-29 ENCOUNTER — TELEPHONE (OUTPATIENT)
Dept: PRIMARY CARE CLINIC | Facility: CLINIC | Age: 69
End: 2024-04-29
Payer: MEDICARE

## 2024-04-29 NOTE — TELEPHONE ENCOUNTER
----- Message from Dee Calero sent at 4/29/2024  4:31 PM CDT -----  Contact: Concepción/ home help nurse  Concepción is needing a call back regarding Jasmina has not been sleeping, oxygen levels are a 94%, need blood level check and not really holding conversation. She advised them to go the ER but they want the home help nurse to see her first. Please call back at [phone].    Thank you dee

## 2024-05-01 ENCOUNTER — TELEPHONE (OUTPATIENT)
Dept: PRIMARY CARE CLINIC | Facility: CLINIC | Age: 69
End: 2024-05-01
Payer: MEDICARE

## 2024-05-01 NOTE — TELEPHONE ENCOUNTER
----- Message from Rajwinder Kendrick sent at 5/1/2024  9:24 AM CDT -----  Contact: self  [phone]  Patient called in this morning requesting a call back to discuss her chest xray results. [facility]  [phone]. Thanks coco

## 2024-05-03 ENCOUNTER — OFFICE VISIT (OUTPATIENT)
Dept: PRIMARY CARE CLINIC | Facility: CLINIC | Age: 69
End: 2024-05-03
Payer: MEDICARE

## 2024-05-03 DIAGNOSIS — L29.9 ITCHING: ICD-10-CM

## 2024-05-03 DIAGNOSIS — S22.49XD CLOSED FRACTURE OF MULTIPLE RIBS WITH ROUTINE HEALING, UNSPECIFIED LATERALITY, SUBSEQUENT ENCOUNTER: ICD-10-CM

## 2024-05-03 DIAGNOSIS — Z09 HOSPITAL DISCHARGE FOLLOW-UP: Primary | ICD-10-CM
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
## 2024-05-03 PROCEDURE — G2211 COMPLEX E/M VISIT ADD ON: HCPCS | Mod: 95,,, | Performed by: FAMILY MEDICINE

## 2024-05-03 PROCEDURE — 99214 OFFICE O/P EST MOD 30 MIN: CPT | Mod: 95,,, | Performed by: FAMILY MEDICINE

## 2024-05-03 PROCEDURE — 4010F ACE/ARB THERAPY RXD/TAKEN: CPT | Mod: CPTII,95,, | Performed by: FAMILY MEDICINE

## 2024-05-03 RX ORDER — TRIAMCINOLONE ACETONIDE 1 MG/G
CREAM TOPICAL 2 TIMES DAILY
Qty: 454 G | Refills: 5 | Status: SHIPPED | OUTPATIENT
Start: 2024-05-03
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
## 2024-05-06 ENCOUNTER — TELEPHONE (OUTPATIENT)
Dept: PRIMARY CARE CLINIC | Facility: CLINIC | Age: 69
End: 2024-05-06
Payer: MEDICARE

## 2024-05-06 NOTE — TELEPHONE ENCOUNTER
----- Message from Gerri Jin sent at 5/6/2024 11:40 AM CDT -----  Contact: homehealth  Calling requesting post op boot for R foot / orders . Please call back at [phone] . Thanksdj

## 2024-05-08 ENCOUNTER — TELEPHONE (OUTPATIENT)
Dept: PRIMARY CARE CLINIC | Facility: CLINIC | Age: 69
End: 2024-05-08
Payer: MEDICARE

## 2024-05-08 NOTE — TELEPHONE ENCOUNTER
----- Message from Soila Montague sent at 5/8/2024  2:48 PM CDT -----  Contact: Self  Patient is calling in regards to the Orthopedic shoe for her right foot. Stated they were supposed to be ordering this for her and wanted to see if it has come in? Can we please look into this and call pt back to advise at [phone]. Thank You.

## 2024-05-08 NOTE — TELEPHONE ENCOUNTER
Called pt and no answer. Lvm in regards to home health sending orders to be sign for orthopedic shoe

## 2024-05-09 ENCOUNTER — TELEPHONE (OUTPATIENT)
Dept: PRIMARY CARE CLINIC | Facility: CLINIC | Age: 69
End: 2024-05-09
Payer: MEDICARE

## 2024-05-09 NOTE — TELEPHONE ENCOUNTER
----- Message from Jacqueline Hagen MA sent at 5/9/2024 12:12 PM CDT -----  Who called:  [facility]  What is the request in detail:    Can the clinic reply by [facility]? No  Status on boot order   Would the patient rather a call back or a response via [facility]? Call back    Best call back number:  [phone] cell   Additional Information:    Thank you.

## 2024-05-09 NOTE — TELEPHONE ENCOUNTER
Called and spoke with [employer]. Informed ginette to refax the order form for post op boot to the fax number provided. Ginette acknowledge. Currently waiting on order.

## 2024-05-13 ENCOUNTER — DOCUMENT SCAN (OUTPATIENT)
Dept: HOME HEALTH SERVICES | Facility: HOSPITAL | Age: 69
End: 2024-05-13
Payer: MEDICARE

## 2024-05-14 ENCOUNTER — CARE AT HOME (OUTPATIENT)
Dept: HOME HEALTH SERVICES | Facility: CLINIC | Age: 69
End: 2024-05-14
Payer: MEDICARE

## 2024-05-14 ENCOUNTER — TELEPHONE (OUTPATIENT)
Dept: PRIMARY CARE CLINIC | Facility: CLINIC | Age: 69
End: 2024-05-14
Payer: MEDICARE

## 2024-05-14 VITALS
DIASTOLIC BLOOD PRESSURE: 60 MMHG | HEART RATE: 73 BPM | SYSTOLIC BLOOD PRESSURE: 96 MMHG | RESPIRATION RATE: 18 BRPM | OXYGEN SATURATION: 99 % | TEMPERATURE: 98 F
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
## 2024-05-14 DIAGNOSIS — R07.81 RIB PAIN ON RIGHT SIDE: ICD-10-CM

## 2024-05-14 PROBLEM — I50.32 CHRONIC DIASTOLIC HEART FAILURE: Status: ACTIVE | Noted: 2022-04-03

## 2024-05-14 PROCEDURE — 99350 HOME/RES VST EST HIGH MDM 60: CPT | Mod: S$GLB,,, | Performed by: NURSE PRACTITIONER

## 2024-05-14 PROCEDURE — 3074F SYST BP LT 130 MM HG: CPT | Mod: CPTII,S$GLB,, | Performed by: NURSE PRACTITIONER

## 2024-05-14 PROCEDURE — 4010F ACE/ARB THERAPY RXD/TAKEN: CPT | Mod: CPTII,S$GLB,, | Performed by: NURSE PRACTITIONER

## 2024-05-14 PROCEDURE — 1126F AMNT PAIN NOTED NONE PRSNT: CPT | Mod: CPTII,S$GLB,, | Performed by: NURSE PRACTITIONER

## 2024-05-14 PROCEDURE — 1160F RVW MEDS BY RX/DR IN RCRD: CPT | Mod: CPTII,S$GLB,, | Performed by: NURSE PRACTITIONER

## 2024-05-14 PROCEDURE — 3078F DIAST BP <80 MM HG: CPT | Mod: CPTII,S$GLB,, | Performed by: NURSE PRACTITIONER

## 2024-05-14 PROCEDURE — 1159F MED LIST DOCD IN RCRD: CPT | Mod: CPTII,S$GLB,, | Performed by: NURSE PRACTITIONER
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
## 2024-05-14 NOTE — TELEPHONE ENCOUNTER
----- Message from Radha Ulloa sent at 5/14/2024  1:58 PM CDT -----  Regarding: [facility]  Contact: Dianna  .Type:  Needs Medical Advice    Who Called: Dianna  Symptoms (please be specific):    How long has patient had these symptoms:    Pharmacy name and phone #:  c  Would the patient rather a call back or a response via [facility]? call  Best Call Back Number: [phone]    Additional Information:  Dianna from [facility] is requesting a callback from the nurse today in regard to the order they received for a shoe for the right foot. They do not order those. Please advise. The patient told her that she order it.

## 2024-05-21 ENCOUNTER — DOCUMENT SCAN (OUTPATIENT)
Dept: HOME HEALTH SERVICES | Facility: HOSPITAL | Age: 69
End: 2024-05-21
Payer: MEDICARE

## 2024-05-24 ENCOUNTER — DOCUMENT SCAN (OUTPATIENT)
Dept: HOME HEALTH SERVICES | Facility: HOSPITAL | Age: 69
End: 2024-05-24
Payer: MEDICARE

## 2024-05-30 ENCOUNTER — EXTERNAL HOME HEALTH (OUTPATIENT)
Dept: HOME HEALTH SERVICES | Facility: HOSPITAL | Age: 69
End: 2024-05-30
Payer: MEDICARE

## 2024-05-30 ENCOUNTER — TELEPHONE (OUTPATIENT)
Dept: PRIMARY CARE CLINIC | Facility: CLINIC | Age: 69
End: 2024-05-30
Payer: MEDICARE

## 2024-05-30 NOTE — TELEPHONE ENCOUNTER
----- Message from Ely Mota sent at 5/30/2024  2:22 PM CDT -----  Contact: [phone]  Patient called in requesting a call back about her upcoming appointment, please call back  [phone]

## 2024-05-30 NOTE — TELEPHONE ENCOUNTER
Spoke with pt and she advised me that she won't be able to make her appt next week. I advised pt that she has a virtual appt in August. Pt advised me that she will wait till August to see doctor

## 2024-05-30 NOTE — TELEPHONE ENCOUNTER
----- Message from Gerri Jin sent at 5/30/2024 11:19 AM CDT -----  Contact: [phone]  Pt is calling regarding appt  .please call back at [phone] . Thanksdj

## 2024-06-03 DIAGNOSIS — I10 PRIMARY HYPERTENSION: ICD-10-CM

## 2024-06-03 DIAGNOSIS — I50.32 CHRONIC DIASTOLIC CONGESTIVE HEART FAILURE: ICD-10-CM

## 2024-06-03 RX ORDER — CETIRIZINE HYDROCHLORIDE 10 MG/1
10 TABLET ORAL DAILY
Qty: 90 TABLET | Refills: 3 | Status: SHIPPED | OUTPATIENT
Start: 2024-06-03

## 2024-06-03 NOTE — TELEPHONE ENCOUNTER
Care Due:                  Date            Visit Type   Department     Provider  --------------------------------------------------------------------------------                                ESTABLISHED                              PATIENT -    [facility]  Last Visit: 05-      [facility]           Brock Ortiz                              ESTABLISHED                              PATIENT -    [facility]  Next Visit: 08-      [facility]           Brock  Diana                                                            Last  Test          Frequency    Reason                     Performed    Due Date  --------------------------------------------------------------------------------    Lipid Panel.  12 months..  rosuvastatin.............  10-   10-    Uric Acid...  12 months..  allopurinoL..............  Not Found    Overdue    [employer] Embedded Care Due Messages. Reference number: [id number].   6/03/2024 11:01:27 AM CDT

## 2024-06-03 NOTE — TELEPHONE ENCOUNTER
No care due was identified.  [facility] Embedded Care Due Messages. Reference number: [id number].   6/03/2024 1:04:41 PM CDT

## 2024-06-03 NOTE — TELEPHONE ENCOUNTER
Refill Routing Note   Medication(s) are not appropriate for processing by [facility] for the following reason(s):        Drug-disease interaction: CKD    [facility] action(s):  Defer     Requires labs : Yes - lipid panel & uric acid outdated            Appointments  past 12m or future 3m with PCP    Date Provider   Last Visit   5/3/2024 Brock Ortiz MD   Next Visit   6/3/2024 Brock Ortiz MD   ED visits in past 90 days: 0        Note composed:4:27 PM 06/03/2024

## 2024-06-04 RX ORDER — METOPROLOL SUCCINATE 25 MG/1
25 TABLET, EXTENDED RELEASE ORAL DAILY
Qty: 90 TABLET | Refills: 3 | Status: SHIPPED | OUTPATIENT
Start: 2024-06-04

## 2024-06-04 RX ORDER — LOSARTAN POTASSIUM 25 MG/1
25 TABLET ORAL DAILY
Qty: 90 TABLET | Refills: 2 | Status: SHIPPED | OUTPATIENT
Start: 2024-06-04

## 2024-06-04 NOTE — TELEPHONE ENCOUNTER
Refill Routing Note   Medication(s) are not appropriate for processing by [facility] for the following reason(s):        Drug-disease interaction      [facility] action(s):  Defer  Approve             Pharmacist review requested: Yes     Appointments  past 12m or future 3m with PCP    Date Provider   Last Visit   5/3/2024 Brock Ortiz MD   Next Visit   8/15/2024 Brock Ortiz MD   ED visits in past 90 days: 0        Note composed:9:21 PM 06/03/2024

## 2024-06-04 NOTE — TELEPHONE ENCOUNTER
Refill Decision Note   Jasmina Sharma  is requesting a refill authorization.  Brief Assessment and Rationale for Refill:  Approve     Medication Therapy Plan:         Pharmacist review requested: Yes   Extended chart review required: Yes   Comments:     Note composed:2:02 AM 06/04/2024

## 2024-06-19 DIAGNOSIS — K21.9 GASTROESOPHAGEAL REFLUX DISEASE WITHOUT ESOPHAGITIS: ICD-10-CM

## 2024-06-19 RX ORDER — PANTOPRAZOLE SODIUM 40 MG/1
40 TABLET, DELAYED RELEASE ORAL DAILY
Qty: 90 TABLET | Refills: 3 | Status: SHIPPED | OUTPATIENT
Start: 2024-06-19

## 2024-06-19 NOTE — TELEPHONE ENCOUNTER
No care due was identified.  [facility] Embedded Care Due Messages. Reference number: [id number].   6/19/2024 2:13:29 PM CDT

## 2024-06-19 NOTE — TELEPHONE ENCOUNTER
Refill Decision Note   Jasmina Sharma  is requesting a refill authorization.  Brief Assessment and Rationale for Refill:  Approve     Medication Therapy Plan:         Comments:     Note composed:4:24 PM 06/19/2024

## 2024-06-24 LAB — CRC RECOMMENDATION EXT: NORMAL

## 2024-08-08 RX ORDER — ALLOPURINOL 100 MG/1
100 TABLET ORAL DAILY
Qty: 90 TABLET | Refills: 3 | Status: SHIPPED | OUTPATIENT
Start: 2024-08-08

## 2024-08-15 ENCOUNTER — PATIENT MESSAGE (OUTPATIENT)
Dept: PRIMARY CARE CLINIC | Facility: CLINIC | Age: 69
End: 2024-08-15

## 2024-08-15 ENCOUNTER — OFFICE VISIT (OUTPATIENT)
Dept: PRIMARY CARE CLINIC | Facility: CLINIC | Age: 69
End: 2024-08-15
Payer: MEDICARE

## 2024-08-15 DIAGNOSIS — G47.9 SLEEP DIFFICULTIES: ICD-10-CM

## 2024-08-15 DIAGNOSIS — I10 PRIMARY HYPERTENSION: ICD-10-CM

## 2024-08-15 DIAGNOSIS — K21.9 GASTROESOPHAGEAL REFLUX DISEASE WITHOUT ESOPHAGITIS: ICD-10-CM

## 2024-08-15 DIAGNOSIS — I50.32 CHRONIC DIASTOLIC CONGESTIVE HEART FAILURE: ICD-10-CM

## 2024-08-15 DIAGNOSIS — E78.5 HYPERLIPIDEMIA, UNSPECIFIED HYPERLIPIDEMIA TYPE: ICD-10-CM

## 2024-08-15 PROCEDURE — G2211 COMPLEX E/M VISIT ADD ON: HCPCS | Mod: 95,,, | Performed by: FAMILY MEDICINE

## 2024-08-15 PROCEDURE — 99214 OFFICE O/P EST MOD 30 MIN: CPT | Mod: 95,,, | Performed by: FAMILY MEDICINE

## 2024-08-15 PROCEDURE — 4010F ACE/ARB THERAPY RXD/TAKEN: CPT | Mod: CPTII,95,, | Performed by: FAMILY MEDICINE

## 2024-08-15 RX ORDER — LOSARTAN POTASSIUM 25 MG/1
50 TABLET ORAL DAILY
Qty: 180 TABLET | Refills: 3 | Status: SHIPPED | OUTPATIENT
Start: 2024-08-15

## 2024-08-15 RX ORDER — CITALOPRAM 40 MG/1
40 TABLET, FILM COATED ORAL DAILY
Qty: 90 TABLET | Refills: 3 | Status: SHIPPED | OUTPATIENT
Start: 2024-08-15 | End: 2025-08-15

## 2024-08-15 RX ORDER — BUMETANIDE 1 MG/1
0.5 TABLET ORAL EVERY OTHER DAY
Qty: 45 TABLET | Refills: 3 | Status: SHIPPED | OUTPATIENT
Start: 2024-08-15

## 2024-08-15 RX ORDER — ROPINIROLE 1 MG/1
1 TABLET, FILM COATED ORAL NIGHTLY
Qty: 90 TABLET | Refills: 3 | Status: SHIPPED | OUTPATIENT
Start: 2024-08-15 | End: 2025-08-15

## 2024-08-15 RX ORDER — ALLOPURINOL 100 MG/1
100 TABLET ORAL DAILY
Qty: 90 TABLET | Refills: 3 | Status: SHIPPED | OUTPATIENT
Start: 2024-08-15

## 2024-08-15 RX ORDER — METOPROLOL SUCCINATE 25 MG/1
25 TABLET, EXTENDED RELEASE ORAL DAILY
Qty: 90 TABLET | Refills: 3 | Status: SHIPPED | OUTPATIENT
Start: 2024-08-15

## 2024-08-15 RX ORDER — ROSUVASTATIN CALCIUM 10 MG/1
10 TABLET, COATED ORAL NIGHTLY
Qty: 90 TABLET | Refills: 3 | Status: SHIPPED | OUTPATIENT
Start: 2024-08-15

## 2024-08-15 RX ORDER — MIRTAZAPINE 15 MG/1
15 TABLET, FILM COATED ORAL NIGHTLY PRN
Qty: 90 TABLET | Refills: 3 | Status: SHIPPED | OUTPATIENT
Start: 2024-08-15

## 2024-08-15 RX ORDER — PANTOPRAZOLE SODIUM 40 MG/1
40 TABLET, DELAYED RELEASE ORAL DAILY
Qty: 90 TABLET | Refills: 3 | Status: SHIPPED | OUTPATIENT
Start: 2024-08-15

## 2024-08-15 NOTE — PROGRESS NOTES
"    [facility]       [address] [place] [address]      Phone: [phone]      Fax: [phone]    Brock Ortiz MD                Video Visit  08/15/2024        Subjective      HPI:  Jasmina Sharma is a 69 y.o. female presents today via video for "No chief complaint on file.  ."     69-year-old female presents today via video visit for follow-up of multiple issues.    Overall, states he is doing okay.  Had a slight nosebleed earlier this week.  Was able to get it stopped.  Nothing like previous nosebleeds.  She thought her blood pressure was a bit elevated so she took an extra dose of her blood pressure medicine.  States her pressure was 145/90 at that time.  She was late taking her medicines that day, she was also out in the sun, unloading groceries.  Was feeling some pressure in her head.    Has hypertension.  Currently takes losartan 25 mg daily.  No issues with this medication.  As above, took an extra dose thinks that her pressure might be running higher regularly.    Has anemia.  Following with Hematology.  They have her taking iron tablets.  She has been doing B12 injections in the past.      Has CKD 4.  Follows with Dr. Esquivel [facility].  He wants her avoiding NSAIDs.    Has CHF.  Uses Bumex every other day for leg swelling.  Takes Toprol-XL 25 mg daily.    Has COPD.  Uses Trelegy inhaler daily.  Also uses albuterol inhaler, as needed.  Has nebulizer at home to do DuoNebs.  Supplements with Zyrtec daily.  Follows regularly with her pulmonologist, Dr. Sun.  States that she sees him every month.  Dr. Sun writes her Klonopin.    Has chronic anxiety.  Takes 1/2 tablet of Klonopin, twice a day.  Also takes Celexa 40 mg daily.    Has elevated cholesterol levels.  Takes Crestor 10 mg daily for this.    Occasionally has sleep issues.  Uses Remeron 15 mg, nightly, to help with sleep.    Occasional episodes of GERD.  Uses Protonix 40 " mg daily.    Occasionally has issues with restless legs.  Was using Mirapex, but her pain management specialist switched her to Requip instead.  Has been taking 0.5 mg nightly.  Does not seem to be helping, or at least not for very long.  She would like to increase the dose.    Has chronic neuropathy.  Follows with Pain Management, Dr. BRIAN Dominguez.  He has her on Lyrica, Norco.    Had issues with alcohol abuse in the past.  Takes folic acid 1 mg every morning.    PMH: HTN, HLD, CAD, CHF, COPD, chronic neuropathy.    PSH: Multiple cosmetic procedures.    F MH: CAD, DM, lung cancer, liver cancer   Allergies:  Amoxicillin causes itching, rash.  Scopolamine causes itching, rash.  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  Lives alone.    T: Denies current use.  Quit in , when went into the hospital.  Previously, two packs per day x 40+ years  A:  Previously daily.  Quit drinking.    D:  Denies     Exercise:  No regular exercise program.  Occasionally gardens.  Gets short of breath with activity.      Pain management:  Dr. BRIAN Dominguez   Pulmonology:  Dr. Sun   Cardiology:  Dr. Taylor     [id number]   Colon:  2024 Amy      Hypertension  This is a recurrent problem. The current episode started 1 to 4 weeks ago. The problem has been gradually worsening since onset. The problem is resistant. Associated symptoms include headaches. Pertinent negatives include no anxiety, blurred vision, chest pain, malaise/fatigue, neck pain, orthopnea, palpitations, peripheral edema, PND, shortness of breath or sweats. There are no known risk factors, dyslipidemia and smoking/tobacco exposure for coronary artery disease. Risk factors for coronary artery disease include no known risk factors, dyslipidemia and smoking/tobacco exposure. Past treatments include beta blockers. The current treatment provides moderate improvement. There are no compliance problems.        The following were updated and reviewed by myself in  the chart: medications, past medical history, past surgical history, family history, social history, and allergies.     Medications:  Current Outpatient Medications on File Prior to Visit   Medication Sig Dispense Refill    albuterol (PROVENTIL/VENTOLIN HFA) 90 mcg/actuation inhaler Inhale 2 puffs into the lungs every 6 (six) hours as needed.      albuterol-ipratropium (DUO-NEB) 2.5 mg-0.5 mg/3 mL nebulizer solution Take by nebulization every 6 (six) hours as needed.      cetirizine (ZYRTEC) 10 MG tablet Take 1 tablet (10 mg total) by mouth once daily. 90 tablet 3    clonazePAM (KLONOPIN) 0.5 MG tablet Take 0.25 mg by mouth nightly as needed.      cyanocobalamin 1,000 mcg/mL injection Inject 1 mL (1,000 mcg total) into the skin every 30 days. for 12 doses 6 mL 1    ferrous sulfate (FEOSOL) 325 mg (65 mg iron) Tab tablet Take 1 tablet 325 mg PO every other day. 45 tablet 1    fluticasone propionate (FLONASE) 50 mcg/actuation nasal spray fluticasone propionate 50 mcg/actuation nasal spray,suspension   USE 1 SPRAY IN EACH NOSTRIL DAILY      guaiFENesin (MUCINEX) 600 mg 12 hr tablet Take 1 tablet (600 mg total) by mouth 2 (two) times daily. 180 tablet 1    HYDROcodone-acetaminophen (NORCO) 7.5-325 mg per tablet SMARTSI Tablet(s) By Mouth Every 12 Hours PRN      naloxone (NARCAN) 4 mg/actuation Spry naloxone 4 mg/actuation nasal spray   SPRAY 1 SPRAY INTO ONE NOSTRIL REPEAT ANOTHER INTO OPPOSITE NOSTRIL IN 2 TO 3 MINUTES UNTIL PT RESPONSIVE OR HELP ARRIVES      pregabalin (LYRICA) 50 MG capsule Take 50 mg by mouth 3 (three) times daily as needed.      TRELEGY ELLIPTA 100-62.5-25 mcg DsDv SMARTSI Inhalation Via Inhaler Daily      triamcinolone acetonide 0.025% (KENALOG) 0.025 % Oint Apply topically 2 (two) times daily. Until symptoms improve. 30 g 0    triamcinolone acetonide 0.1% (KENALOG) 0.1 % cream Apply topically 2 (two) times daily. 454 g 5    [DISCONTINUED] allopurinoL (ZYLOPRIM) 100 MG tablet TAKE 1 TABLET  (100 MG TOTAL) BY MOUTH ONCE DAILY. 90 tablet 3    [DISCONTINUED] bumetanide (BUMEX) 1 MG tablet Take 0.5 tablets (0.5 mg total) by mouth every other day. 45 tablet 3    [DISCONTINUED] citalopram (CELEXA) 40 MG tablet Take 1 tablet (40 mg total) by mouth once daily. 90 tablet 3    [DISCONTINUED] folic acid (FOLVITE) 1 MG tablet Take 1 tablet (1,000 mcg total) by mouth every morning. 90 tablet 3    [DISCONTINUED] losartan (COZAAR) 25 MG tablet Take 1 tablet (25 mg total) by mouth once daily. 90 tablet 2    [DISCONTINUED] metoprolol succinate (TOPROL-XL) 25 MG 24 hr tablet Take 1 tablet (25 mg total) by mouth once daily. 90 tablet 3    [DISCONTINUED] mirtazapine (REMERON) 15 MG tablet Take 1 tablet (15 mg total) by mouth nightly as needed (sleep). 90 tablet 3    [DISCONTINUED] pantoprazole (PROTONIX) 40 MG tablet TAKE 1 TABLET (40 MG TOTAL) BY MOUTH ONCE DAILY. 90 tablet 3    [DISCONTINUED] pramipexole (MIRAPEX) 0.75 MG tablet pramipexole 0.75 mg tablet   TAKE 1 TABLET BY MOUTH AT BEDTIME FOR RESTLESS LEGS      [DISCONTINUED] rosuvastatin (CRESTOR) 10 MG tablet Take 1 tablet (10 mg total) by mouth every evening. 90 tablet 3     No current facility-administered medications on file prior to visit.        PMHx:  Past Medical History:   Diagnosis Date    COPD (chronic obstructive pulmonary disease)     Encounter for blood transfusion     Hyperlipidemia     Hypertension       Patient Active Problem List    Diagnosis Date Noted    Anemia due to stage 4 chronic kidney disease 01/22/2024    Tobacco use 11/02/2023    Idiopathic progressive neuropathy 10/31/2023    Chronic pain syndrome 10/31/2023    Normocytic anemia 10/31/2023    Iron deficiency anemia 10/31/2023    Hepatic cirrhosis, unspecified hepatic cirrhosis type 02/16/2023    Stage 3a chronic kidney disease 02/16/2023    Chronic diastolic heart failure 04/03/2022    Hyperlipidemia 01/28/2019    Essential hypertension 05/02/2016        PSHx:  Past Surgical History:    Procedure Laterality Date    AUGMENTATION OF BREAST Bilateral     3 times    BREAST SURGERY          FHx:  Family History   Problem Relation Name Age of Onset    Hypertension Mother      Memory loss Mother      Hypertension Father      Diabetes Father      Cancer Father          Social:  Social History     Socioeconomic History    Marital status:    Tobacco Use    Smoking status: Former     Current packs/day: 0.00     Average packs/day: 2.0 packs/day for 49.8 years (99.6 ttl pk-yrs)     Types: Cigarettes, Vaping with nicotine     Start date:      Quit date: 10/22/2023     Years since quittin.8     Passive exposure: Past    Smokeless tobacco: Never    Tobacco comments:     Currently vaping low dose 2.4mcg   Substance and Sexual Activity    Alcohol use: Not Currently    Drug use: Never    Sexual activity: Not Currently     Social Determinants of Health     Financial Resource Strain: Low Risk  (3/22/2024)    [facility] from [facility]sidiaries and Affiliates, [facility] Subsidiaries and Affiliates    Overall Financial Resource Strain (CARDIA)     Difficulty of Paying Living Expenses: Not hard at all   Food Insecurity: No Food Insecurity (2024)    Received from [facility] Its Subsidiaries and Affiliates    Hunger Vital Sign     Worried About Running Out of Food in the Last Year: Never true     Ran Out of Food in the Last Year: Never true   Transportation Needs: No Transportation Needs (2024)    Received from [email or web address] [facility] and Its Subsidiaries and Affiliates    PRAPARE - Transportation     Lack of Transportation (Medical): No     Lack of Transportation (Non-Medical): No   Physical Activity: Inactive (10/31/2023)    Exercise Vital Sign     Days of Exercise per Week: 0 days     Minutes of Exercise per Session: 0 min  "  Stress: No Stress Concern Present (10/31/2023)    [employer] [place] [employer] - Occupational Stress Questionnaire     Feeling of Stress : Not at all   Housing Stability: Low Risk  (3/22/2024)    [facility] and Affiliates, [facility]    Housing Stability Vital Sign     Unable to Pay for Housing in the Last Year: No     Number of Places Lived in the Last Year: 1     In the last 12 months, was there a time when you did not have a steady place to sleep or slept in a shelter (including now)?: No        Allergies:  Review of patient's allergies indicates:   Allergen Reactions    Amoxicillin Hives, Itching and Rash    Scopolamine Itching        ROS:  Review of Systems   Constitutional:  Negative for activity change, appetite change, chills, fever and malaise/fatigue.   HENT:  Negative for congestion, postnasal drip, rhinorrhea, sore throat and trouble swallowing.    Eyes:  Negative for blurred vision.   Respiratory:  Negative for cough, shortness of breath and wheezing.    Cardiovascular:  Negative for chest pain, palpitations, orthopnea and PND.   Gastrointestinal:  Negative for abdominal pain, constipation, diarrhea, nausea and vomiting.   Genitourinary:  Negative for difficulty urinating.   Musculoskeletal:  Negative for arthralgias, myalgias and neck pain.   Skin:  Negative for color change and rash.   Neurological:  Positive for headaches. Negative for speech difficulty.   All other systems reviewed and are negative.         Objective      There were no vitals taken for this visit.  Ht Readings from Last 3 Encounters:   01/22/24 5' 2" (1.575 m)   12/07/23 5' 2" (1.575 m)   10/31/23 5' 2" (1.575 m)     Wt Readings from Last 3 Encounters:   01/22/24 59.1 kg (130 lb 4.7 oz)   12/07/23 56.7 kg (125 lb)   10/31/23 55.3 kg (122 lb)       PHYSICAL EXAM:  Physical " Exam  Constitutional:       General: She is not in acute distress.     Appearance: Normal appearance. She is not ill-appearing.   HENT:      Head: Normocephalic and atraumatic.   Pulmonary:      Effort: Pulmonary effort is normal. No respiratory distress.   Neurological:      Mental Status: She is alert.   Psychiatric:         Mood and Affect: Mood normal.         Behavior: Behavior normal.         Thought Content: Thought content normal.              LABS / IMAGING:  Recent Results (from the past 4368 hour(s))   Cologuard Screening (Multitarget Stool DNA)    Collection Time: 02/15/24 10:15 PM    Specimen: Rectum; Stool   Result Value Ref Range    Cologuard Result Positive (A) Negative   CBC Auto Differential    Collection Time: 02/26/24  2:21 PM   Result Value Ref Range    WBC 8.88 3.90 - 12.70 K/uL    RBC 3.75 (L) 4.00 - 5.40 M/uL    Hemoglobin 11.4 (L) 12.0 - 16.0 g/dL    Hematocrit 37.3 37.0 - 48.5 %     (H) 82 - 98 fL    MCH 30.4 27.0 - 31.0 pg    MCHC 30.6 (L) 32.0 - 36.0 g/dL    RDW 14.6 (H) 11.5 - 14.5 %    Platelets 291 150 - 450 K/uL    MPV 11.2 9.2 - 12.9 fL    Immature Granulocytes 0.5 0.0 - 0.5 %    Gran # (ANC) 5.5 1.8 - 7.7 K/uL    Immature Grans (Abs) 0.04 0.00 - 0.04 K/uL    Lymph # 2.5 1.0 - 4.8 K/uL    Mono # 0.6 0.3 - 1.0 K/uL    Eos # 0.2 0.0 - 0.5 K/uL    Baso # 0.03 0.00 - 0.20 K/uL    nRBC 0 0 /100 WBC    Gran % 61.7 38.0 - 73.0 %    Lymph % 27.8 18.0 - 48.0 %    Mono % 7.1 4.0 - 15.0 %    Eosinophil % 2.6 0.0 - 8.0 %    Basophil % 0.3 0.0 - 1.9 %    Differential Method Automated    Comprehensive Metabolic Panel    Collection Time: 02/26/24  2:21 PM   Result Value Ref Range    Sodium 143 136 - 145 mmol/L    Potassium 4.3 3.5 - 5.1 mmol/L    Chloride 104 95 - 110 mmol/L    CO2 27 23 - 29 mmol/L    Glucose 128 (H) 70 - 110 mg/dL    BUN 39 (H) 8 - 23 mg/dL    Creatinine 2.0 (H) 0.5 - 1.4 mg/dL    Calcium 9.7 8.7 - 10.5 mg/dL    Total Protein 7.2 6.0 - 8.4 g/dL    Albumin 3.4 (L) 3.5 - 5.2  g/dL    Total Bilirubin 0.3 0.1 - 1.0 mg/dL    Alkaline Phosphatase 95 55 - 135 U/L    AST 15 10 - 40 U/L    ALT 8 (L) 10 - 44 U/L    eGFR 27 (A) >60 mL/min/1.73 m^2    Anion Gap 12 8 - 16 mmol/L   Ferritin    Collection Time: 02/26/24  2:21 PM   Result Value Ref Range    Ferritin 333 (H) 20.0 - 300.0 ng/mL   Iron and TIBC    Collection Time: 02/26/24  2:21 PM   Result Value Ref Range    Iron 56 30 - 160 ug/dL    Transferrin 187 (L) 200 - 375 mg/dL    TIBC 277 250 - 450 ug/dL    Saturated Iron 20 20 - 50 %   TROPONIN    Collection Time: 03/20/24  7:18 AM   Result Value Ref Range    Troponin I <0.01 0.00 - 0.03 ng/mL   LACTIC ACID, PLASMA    Collection Time: 03/20/24  7:18 AM   Result Value Ref Range    Lactic Acid 1.3 0.5 - 2.2 mmol/L   Cristy 2 Flu + SARS Antigen MARCK -Use Dry Swab    Collection Time: 03/20/24  7:24 AM   Result Value Ref Range    Inflenza A Ag Negative Negative    Influenza B Ag Negative Negative    COVID-19 Ag Negative Negative   CK    Collection Time: 03/20/24  7:58 AM   Result Value Ref Range    CPK 95 29 - 168 U/L   LACTATE DEHYDROGENASE    Collection Time: 03/20/24  7:58 AM   Result Value Ref Range     125 - 250 U/L   LIPASE    Collection Time: 03/20/24  7:58 AM   Result Value Ref Range    Lipase 14 8 - 78 U/L   B-TYPE NATRIURETIC PEPTIDE    Collection Time: 03/20/24  7:58 AM   Result Value Ref Range    BNP 40 15 - 159 PG/ML   PROTIME-INR    Collection Time: 03/20/24 10:29 AM   Result Value Ref Range    PT 14.8 11.5 - 15.3 SECONDS    INR 1.1    LACTIC ACID, PLASMA    Collection Time: 03/20/24 12:13 PM   Result Value Ref Range    Lactic Acid 2.5 (H) 0.5 - 2.2 mmol/L   LACTIC ACID, PLASMA    Collection Time: 03/20/24  1:55 PM   Result Value Ref Range    Lactic Acid 2.9 (H) 0.5 - 2.2 mmol/L   LACTIC ACID, PLASMA    Collection Time: 03/20/24  4:08 PM   Result Value Ref Range    Lactic Acid 3.7 (H) 0.5 - 2.2 mmol/L   LACTIC ACID, PLASMA    Collection Time: 03/20/24  8:06 PM   Result Value Ref  Range    Lactic Acid 2.7 (H) 0.5 - 2.2 mmol/L   LACTIC ACID, PLASMA    Collection Time: 03/20/24 10:07 PM   Result Value Ref Range    Lactic Acid 2.8 (H) 0.5 - 2.2 mmol/L   RENAL FUNCTION PANEL    Collection Time: 03/22/24  4:06 AM   Result Value Ref Range    Creatinine 1.25 (H) 0.55 - 1.02 mg/dL    Blood Urea Nitrogen 33 (H) 5 - 25 mg/dL    Calcium 8.6 (L) 8.8 - 10.6 mg/dL    Glucose Screen 155 (H) 70 - 100 mg/dL    Sodium 137 136 - 145 mmol/L    Potassium 5.1 3.5 - 5.1 mmol/L    Chloride 101 100 - 109 mmol/L    Carbon Dioxide 24 22 - 33 mmol/L    Albumin Level 3.1 (L) 3.5 - 5.0 g/dl    Phosphorus Level 3.5 2.3 - 4.7 MG/DL    Anion Gap 12 8 - 16 mmol/L    eGFR African American 47 mL/min/1.73mSq   MAGNESIUM    Collection Time: 03/22/24  4:06 AM   Result Value Ref Range    Magnesium Level 2.0 1.6 - 2.6 MG/DL   CK    Collection Time: 06/22/24  8:24 PM   Result Value Ref Range    CPK 69 29 - 168 U/L   D-DIMER (THROMBOSIS)    Collection Time: 06/22/24  8:24 PM   Result Value Ref Range    D-Dimer 1.48 (H) 0.00 - 0.49 ug/mL FEU   TROPONIN    Collection Time: 06/22/24  8:24 PM   Result Value Ref Range    Troponin I <0.01 <0.01 - 0.03 ng/mL   CKMB    Collection Time: 06/22/24  8:24 PM   Result Value Ref Range    CK-MB 0.7 0.1 - 6.5 NG/ML         Assessment    1. Chronic diastolic congestive heart failure    2. Primary hypertension    3. Sleep difficulties    4. Gastroesophageal reflux disease without esophagitis    5. Hyperlipidemia, unspecified hyperlipidemia type          Plan    Diagnoses and all orders for this visit:    Chronic diastolic congestive heart failure  -     bumetanide (BUMEX) 1 MG tablet; Take 0.5 tablets (0.5 mg total) by mouth every other day.  -     metoprolol succinate (TOPROL-XL) 25 MG 24 hr tablet; Take 1 tablet (25 mg total) by mouth once daily.    Primary hypertension  -     losartan (COZAAR) 25 MG tablet; Take 2 tablets (50 mg total) by mouth once daily.  -     metoprolol succinate (TOPROL-XL) 25  MG 24 hr tablet; Take 1 tablet (25 mg total) by mouth once daily.    Sleep difficulties  -     mirtazapine (REMERON) 15 MG tablet; Take 1 tablet (15 mg total) by mouth nightly as needed (sleep).    Gastroesophageal reflux disease without esophagitis  -     pantoprazole (PROTONIX) 40 MG tablet; Take 1 tablet (40 mg total) by mouth once daily.    Hyperlipidemia, unspecified hyperlipidemia type  -     rosuvastatin (CRESTOR) 10 MG tablet; Take 1 tablet (10 mg total) by mouth every evening.    Other orders  -     rOPINIRole (REQUIP) 1 MG tablet; Take 1 tablet (1 mg total) by mouth every evening.  -     allopurinoL (ZYLOPRIM) 100 MG tablet; Take 1 tablet (100 mg total) by mouth once daily.  -     citalopram (CELEXA) 40 MG tablet; Take 1 tablet (40 mg total) by mouth once daily.    On screen, looks fine.      Refills, as above.      Historically, her blood pressure has been good, even on the low side.  Will change your prescription for losartan to 25 mg, two tablets, daily.  This way if her pressure is okay, she can just take one.  If it is elevated, she can take an extra.    Okay to increase Requip to 1 mg nightly.  We can always titrate this up to 3 mg per night.      FOLLOW-UP:  Follow up in about 6 months (around 2/15/2025) for check up.    The patient location is:  [place]  The chief complaint leading to consultation is:  Checkup, HTN, HLD, restless legs, refills    Visit type: audiovisual    Face to Face time with patient: 20 minutes    30 minutes of total time spent on the encounter, which includes face to face time and non-face to face time preparing to see the patient (eg, review of tests), Obtaining and/or reviewing separately obtained history, Documenting clinical information in the electronic or other health record, Independently interpreting results (not separately reported) and communicating results to the patient/family/caregiver, or Care coordination (not separately reported). Visit today included  increased complexity associated with the care of the episodic problem addressed and managing the longitudinal care of the patient due to the serious and/or complex managed problem(s).    Each patient to whom he or she provides medical services by telemedicine is:  (1) informed of the relationship between the physician and patient and the respective role of any other health care provider with respect to management of the patient; and (2) notified that he or she may decline to receive medical services by telemedicine and may withdraw from such care at any time.    Signed by:  Brock Ortiz MD

## 2024-08-16 ENCOUNTER — LAB VISIT (OUTPATIENT)
Dept: LAB | Facility: HOSPITAL | Age: 69
End: 2024-08-16
Attending: INTERNAL MEDICINE
Payer: MEDICARE

## 2024-08-16 DIAGNOSIS — E55.9 AVITAMINOSIS D: ICD-10-CM

## 2024-08-16 DIAGNOSIS — N18.31 CHRONIC KIDNEY DISEASE (CKD) STAGE G3A/A1, MODERATELY DECREASED GLOMERULAR FILTRATION RATE (GFR) BETWEEN 45-59 ML/MIN/1.73 SQUARE METER AND ALBUMINURIA CREATININE RATIO LESS THAN 30 MG/G: Primary | ICD-10-CM

## 2024-08-16 LAB
ALBUMIN SERPL BCP-MCNC: 3.6 G/DL (ref 3.5–5.2)
ANION GAP SERPL CALC-SCNC: 9 MMOL/L (ref 8–16)
BASOPHILS # BLD AUTO: 0.04 K/UL (ref 0–0.2)
BASOPHILS NFR BLD: 0.7 % (ref 0–1.9)
BUN SERPL-MCNC: 33 MG/DL (ref 8–23)
CALCIUM SERPL-MCNC: 9.3 MG/DL (ref 8.7–10.5)
CHLORIDE SERPL-SCNC: 99 MMOL/L (ref 95–110)
CO2 SERPL-SCNC: 28 MMOL/L (ref 23–29)
CREAT SERPL-MCNC: 1.6 MG/DL (ref 0.5–1.4)
CREAT UR-MCNC: 75 MG/DL (ref 15–325)
DIFFERENTIAL METHOD BLD: ABNORMAL
EOSINOPHIL # BLD AUTO: 0.1 K/UL (ref 0–0.5)
EOSINOPHIL NFR BLD: 2.4 % (ref 0–8)
ERYTHROCYTE [DISTWIDTH] IN BLOOD BY AUTOMATED COUNT: 14.3 % (ref 11.5–14.5)
EST. GFR  (NO RACE VARIABLE): 34.9 ML/MIN/1.73 M^2
GLUCOSE SERPL-MCNC: 132 MG/DL (ref 70–110)
HCT VFR BLD AUTO: 36.6 % (ref 37–48.5)
HGB BLD-MCNC: 10.7 G/DL (ref 12–16)
IMM GRANULOCYTES # BLD AUTO: 0.02 K/UL (ref 0–0.04)
IMM GRANULOCYTES NFR BLD AUTO: 0.4 % (ref 0–0.5)
LYMPHOCYTES # BLD AUTO: 1.5 K/UL (ref 1–4.8)
LYMPHOCYTES NFR BLD: 26.6 % (ref 18–48)
MCH RBC QN AUTO: 29.8 PG (ref 27–31)
MCHC RBC AUTO-ENTMCNC: 29.2 G/DL (ref 32–36)
MCV RBC AUTO: 102 FL (ref 82–98)
MICROSCOPIC COMMENT: NORMAL
MONOCYTES # BLD AUTO: 0.5 K/UL (ref 0.3–1)
MONOCYTES NFR BLD: 9.2 % (ref 4–15)
NEUTROPHILS # BLD AUTO: 3.3 K/UL (ref 1.8–7.7)
NEUTROPHILS NFR BLD: 60.7 % (ref 38–73)
NRBC BLD-RTO: 0 /100 WBC
PHOSPHATE SERPL-MCNC: 3 MG/DL (ref 2.7–4.5)
PLATELET # BLD AUTO: 219 K/UL (ref 150–450)
PMV BLD AUTO: 11.8 FL (ref 9.2–12.9)
POTASSIUM SERPL-SCNC: 4.2 MMOL/L (ref 3.5–5.1)
PROT UR-MCNC: 10 MG/DL (ref 0–15)
PROT/CREAT UR: 0.13 MG/G{CREAT} (ref 0–0.2)
RBC # BLD AUTO: 3.59 M/UL (ref 4–5.4)
RBC #/AREA URNS AUTO: 0 /HPF (ref 0–4)
SODIUM SERPL-SCNC: 136 MMOL/L (ref 136–145)
WBC # BLD AUTO: 5.45 K/UL (ref 3.9–12.7)
WBC #/AREA URNS AUTO: 0 /HPF (ref 0–5)

## 2024-08-16 PROCEDURE — 36415 COLL VENOUS BLD VENIPUNCTURE: CPT | Mod: PN | Performed by: INTERNAL MEDICINE

## 2024-08-16 PROCEDURE — 82652 VIT D 1 25-DIHYDROXY: CPT | Performed by: INTERNAL MEDICINE

## 2024-08-16 PROCEDURE — 84156 ASSAY OF PROTEIN URINE: CPT | Performed by: INTERNAL MEDICINE

## 2024-08-16 PROCEDURE — 80069 RENAL FUNCTION PANEL: CPT | Performed by: INTERNAL MEDICINE

## 2024-08-16 PROCEDURE — 83970 ASSAY OF PARATHORMONE: CPT | Performed by: INTERNAL MEDICINE

## 2024-08-16 PROCEDURE — 85025 COMPLETE CBC W/AUTO DIFF WBC: CPT | Performed by: INTERNAL MEDICINE

## 2024-08-16 PROCEDURE — 81001 URINALYSIS AUTO W/SCOPE: CPT | Performed by: INTERNAL MEDICINE

## 2024-08-17 LAB — PTH-INTACT SERPL-MCNC: 142.7 PG/ML (ref 9–77)

## 2024-08-19 LAB — 1,25(OH)2D3 SERPL-MCNC: 29 PG/ML (ref 20–79)

## 2024-12-04 DIAGNOSIS — N18.31 STAGE 3A CHRONIC KIDNEY DISEASE: ICD-10-CM

## 2024-12-10 ENCOUNTER — PATIENT MESSAGE (OUTPATIENT)
Dept: INTERNAL MEDICINE | Facility: CLINIC | Age: 69
End: 2024-12-10
Payer: MEDICARE

## 2025-01-13 ENCOUNTER — PATIENT OUTREACH (OUTPATIENT)
Dept: ADMINISTRATIVE | Facility: HOSPITAL | Age: 70
End: 2025-01-13
Payer: MEDICARE

## 2025-02-27 ENCOUNTER — OFFICE VISIT (OUTPATIENT)
Dept: PRIMARY CARE CLINIC | Facility: CLINIC | Age: 70
End: 2025-02-27
Payer: MEDICARE

## 2025-02-27 VITALS
SYSTOLIC BLOOD PRESSURE: 130 MMHG | HEIGHT: 62 IN | OXYGEN SATURATION: 94 % | DIASTOLIC BLOOD PRESSURE: 88 MMHG | BODY MASS INDEX: 22.84 KG/M2 | HEART RATE: 69 BPM | TEMPERATURE: 99 F | WEIGHT: 124.13 LBS

## 2025-02-27 DIAGNOSIS — K21.9 GASTROESOPHAGEAL REFLUX DISEASE WITHOUT ESOPHAGITIS: ICD-10-CM

## 2025-02-27 DIAGNOSIS — Z12.31 BREAST CANCER SCREENING BY MAMMOGRAM: ICD-10-CM

## 2025-02-27 DIAGNOSIS — G47.9 SLEEP DIFFICULTIES: ICD-10-CM

## 2025-02-27 DIAGNOSIS — I10 PRIMARY HYPERTENSION: ICD-10-CM

## 2025-02-27 DIAGNOSIS — M54.50 ACUTE BILATERAL LOW BACK PAIN, UNSPECIFIED WHETHER SCIATICA PRESENT: Primary | ICD-10-CM

## 2025-02-27 DIAGNOSIS — I50.32 CHRONIC DIASTOLIC CONGESTIVE HEART FAILURE: ICD-10-CM

## 2025-02-27 DIAGNOSIS — D63.1 ANEMIA DUE TO STAGE 3B CHRONIC KIDNEY DISEASE: ICD-10-CM

## 2025-02-27 DIAGNOSIS — Z79.899 LONG-TERM USE OF HIGH-RISK MEDICATION: ICD-10-CM

## 2025-02-27 DIAGNOSIS — N18.32 ANEMIA DUE TO STAGE 3B CHRONIC KIDNEY DISEASE: ICD-10-CM

## 2025-02-27 DIAGNOSIS — E78.5 HYPERLIPIDEMIA, UNSPECIFIED HYPERLIPIDEMIA TYPE: ICD-10-CM

## 2025-02-27 PROBLEM — K74.60 HEPATIC CIRRHOSIS, UNSPECIFIED HEPATIC CIRRHOSIS TYPE: Status: RESOLVED | Noted: 2023-02-16 | Resolved: 2025-02-27

## 2025-02-27 PROCEDURE — 99999 PR PBB SHADOW E&M-EST. PATIENT-LVL V: CPT | Mod: PBBFAC,,, | Performed by: FAMILY MEDICINE

## 2025-02-27 RX ORDER — LOSARTAN POTASSIUM 25 MG/1
50 TABLET ORAL DAILY
Qty: 180 TABLET | Refills: 3 | Status: SHIPPED | OUTPATIENT
Start: 2025-02-27

## 2025-02-27 RX ORDER — NALOXONE HYDROCHLORIDE 4 MG/.1ML
1 SPRAY NASAL ONCE
Qty: 1 EACH | Refills: 0 | Status: SHIPPED | OUTPATIENT
Start: 2025-02-27 | End: 2025-02-27

## 2025-02-27 RX ORDER — BUMETANIDE 1 MG/1
0.5 TABLET ORAL EVERY OTHER DAY
Qty: 45 TABLET | Refills: 3 | Status: SHIPPED | OUTPATIENT
Start: 2025-02-27

## 2025-02-27 RX ORDER — ROPINIROLE 1 MG/1
1 TABLET, FILM COATED ORAL NIGHTLY
Qty: 90 TABLET | Refills: 3 | Status: SHIPPED | OUTPATIENT
Start: 2025-02-27 | End: 2026-02-27

## 2025-02-27 RX ORDER — MIRTAZAPINE 15 MG/1
15 TABLET, FILM COATED ORAL NIGHTLY PRN
Qty: 90 TABLET | Refills: 3 | Status: SHIPPED | OUTPATIENT
Start: 2025-02-27

## 2025-02-27 RX ORDER — METOPROLOL SUCCINATE 25 MG/1
25 TABLET, EXTENDED RELEASE ORAL DAILY
Qty: 90 TABLET | Refills: 3 | Status: SHIPPED | OUTPATIENT
Start: 2025-02-27

## 2025-02-27 RX ORDER — CITALOPRAM 40 MG/1
40 TABLET, FILM COATED ORAL DAILY
Qty: 90 TABLET | Refills: 3 | Status: SHIPPED | OUTPATIENT
Start: 2025-02-27 | End: 2026-02-27

## 2025-02-27 RX ORDER — PANTOPRAZOLE SODIUM 40 MG/1
40 TABLET, DELAYED RELEASE ORAL DAILY
Qty: 90 TABLET | Refills: 3 | Status: SHIPPED | OUTPATIENT
Start: 2025-02-27

## 2025-02-27 RX ORDER — ROSUVASTATIN CALCIUM 10 MG/1
10 TABLET, COATED ORAL NIGHTLY
Qty: 90 TABLET | Refills: 3 | Status: SHIPPED | OUTPATIENT
Start: 2025-02-27

## 2025-02-27 RX ORDER — ALLOPURINOL 100 MG/1
100 TABLET ORAL DAILY
Qty: 90 TABLET | Refills: 3 | Status: SHIPPED | OUTPATIENT
Start: 2025-02-27

## 2025-02-27 RX ORDER — KETOROLAC TROMETHAMINE 30 MG/ML
30 INJECTION, SOLUTION INTRAMUSCULAR; INTRAVENOUS
Status: COMPLETED | OUTPATIENT
Start: 2025-02-27 | End: 2025-02-27

## 2025-02-27 RX ADMIN — KETOROLAC TROMETHAMINE 30 MG: 30 INJECTION, SOLUTION INTRAMUSCULAR; INTRAVENOUS at 03:02

## 2025-02-27 NOTE — PATIENT INSTRUCTIONS
Let us do a Toradol injection today to see if that helps with your back.  Continue taking your other pain medications, as directed.    If you would like to do some physical therapy, let us know.  We can always get you scheduled somewhere.      Refills of your other medicines have been sent to the pharmacy.  Please continue taking them, as directed.    We are due for screening mammogram.  Order placed.  We can set that up here, at your convenience.      I did send a refill of your mother's Cymbalta (duloxetine) to [facility].  Please double check with them to make sure they received it.    Continue to eat a healthy diet.  Be careful with portion sizes.  Includes lots of fresh fruits, vegetables, whole grains, lean proteins.  See info below.    Keep hydrated.  Be sure to drink at least 8-10, 8 oz, glasses of water every day.    Stay active.  Try to do some sort of physical activity every day.  Nothing outrageous, just try walking for 10-15 minutes each day.

## 2025-02-27 NOTE — PROGRESS NOTES
After obtaining consent, and per orders of , Toradol 30mg injection given by Brandi Fairbanks LPN. Patient instructed to remain in room for 15 minutes afterwards, and to report any adverse reactions to me immediately.          Brandi Fairbanks LPN

## 2025-02-28 NOTE — PROGRESS NOTES
"    [facility]       [address] [place] [address]      Phone: [phone]      Fax: [phone]    Brock Ortiz MD                Office Visit  02/27/2025        Subjective      HPI:  Jasmina Sharma is a 69 y.o. female presents today in clinic for "Annual Exam  ."     69-year-old female presents today for follow-up of multiple issues.    Overall, doing okay.  Back is hurting her today.  She has been doing lots of gardening and that has caused her back to stiffen up.  Already has chronic neuropathy.  Follows with pain management, see below.    Otherwise, doing fine.  No chest pain, shortness on breath.  No fever, chills, body aches.  No coughing, sneezing, URI type symptoms.  Appetite normal.  Bowel movements normal.  No urinary issues.    Has hypertension.  Currently takes losartan 25 mg daily.  No issues with this medication.  As above, took an extra dose thinks that her pressure might be running higher regularly.    Has anemia.  Following with Hematology.  They have her taking iron tablets.  She has been doing B12 injections in the past.      Has CKD 3b.  Follows with Dr. [facility]qu[facility].  He wants her avoiding NSAIDs.    Has CHF.  Uses Bumex every other day for leg swelling.  Takes Toprol-XL 25 mg daily.    Has COPD.  Uses Trelegy inhaler daily.  Also uses albuterol inhaler, as needed.  Has nebulizer at home to do DuoNebs.  Supplements with Zyrtec daily.  Follows regularly with her pulmonologist, Dr. Sun.  States that she sees him every month.  Dr. Sun writes her Klonopin.    Has chronic anxiety.  Takes 1/2 tablet of Klonopin, twice a day.  Also takes Celexa 40 mg daily.    Has elevated cholesterol levels.  Takes Crestor 10 mg daily for this.    Occasionally has sleep issues.  Uses Remeron 15 mg, nightly, to help with sleep.    Occasional episodes of GERD.  Uses Protonix 40 mg daily.    Occasionally has issues with restless " legs.  Was using Mirapex, but her pain management specialist switched her to Requip instead.  Has been taking 0.5 mg nightly.  Does not seem to be helping, or at least not for very long.  She would like to increase the dose.    Has chronic neuropathy.  Follows with Pain Management, Dr. BRIAN Dominguez.  He has her on Lyrica, Norco.    Had issues with alcohol abuse in the past.  Takes folic acid 1 mg every morning.    PMH: HTN, HLD, CAD, CHF, COPD, chronic neuropathy.    PSH: Multiple cosmetic procedures.    F MH: CAD, DM, lung cancer, liver cancer   Allergies:  Amoxicillin causes itching, rash.  Scopolamine causes itching, rash.  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  Lives alone.    T: Denies current use.  Quit in , when went into the hospital.  Previously, two packs per day x 40+ years  A:  Previously daily.  Quit drinking.    D:  Denies     Exercise:  No regular exercise program.  Occasionally gardens.  Gets short of breath with activity.      Pain management:  Dr. BRIAN Dominguez   Pulmonology:  Dr. Sun   Cardiology:  Dr. Taylor     [id number]   Colon:  2024 Amy            The following were updated and reviewed by myself in the chart: medications, past medical history, past surgical history, family history, social history, and allergies.     Medications:  Medications Ordered Prior to Encounter[1]     PMHx:  Past Medical History:   Diagnosis Date    COPD (chronic obstructive pulmonary disease)     Encounter for blood transfusion     Hyperlipidemia     Hypertension       Patient Active Problem List    Diagnosis Date Noted    Anemia due to stage 3b chronic kidney disease 2024    Tobacco use 2023    Idiopathic progressive neuropathy 10/31/2023    Chronic pain syndrome 10/31/2023    Normocytic anemia 10/31/2023    Iron deficiency anemia 10/31/2023    Stage 3a chronic kidney disease 2023    Chronic diastolic heart failure 2022    Hyperlipidemia 2019    Essential  hypertension 2016        PSHx:  Past Surgical History:   Procedure Laterality Date    AUGMENTATION OF BREAST Bilateral     3 times    BREAST SURGERY          FHx:  Family History   Problem Relation Name Age of Onset    Hypertension Mother      Memory loss Mother      Hypertension Father      Diabetes Father      Cancer Father          Social:  Social History     Socioeconomic History    Marital status:    Tobacco Use    Smoking status: Former     Current packs/day: 0.00     Average packs/day: 2.0 packs/day for 49.8 years (99.6 ttl pk-yrs)     Types: Cigarettes, Vaping with nicotine     Start date:      Quit date: 10/22/2023     Years since quittin.3     Passive exposure: Past    Smokeless tobacco: Never    Tobacco comments:     Currently vaping low dose 2.4mcg   Substance and Sexual Activity    Alcohol use: Not Currently    Drug use: Never    Sexual activity: Not Currently     Social Drivers of Health     Financial Resource Strain: Low Risk  (3/22/2024)    [facility]    Overall Financial Resource Strain (CARDIA)     Difficulty of Paying Living Expenses: Not hard at all   Food Insecurity: No Food Insecurity (2024)    [facility]    Hunger Vital Sign     Worried About Running Out of Food in the Last Year: Never true     Ran Out of Food in the Last Year: Never true   Transportation Needs: No Transportation Needs (2024)    [facility]    PRAPARE - Transportation     Lack of Transportation (Medical): No     Lack of Transportation (Non-Medical): No   Physical Activity: Inactive (10/31/2023)    Exercise Vital Sign     Days of Exercise per Week: 0 days     Minutes of Exercise per Session: 0 min   Stress: No Stress Concern Present  "(10/31/2023)    [employer] - Occupational Stress Questionnaire     Feeling of Stress : Not at all   Housing Stability: Low Risk  (3/22/2024)    [facility] from [facility] and Its Subsidiaries and Affiliates    Housing Stability Vital Sign     Unable to Pay for Housing in the Last Year: No     Number of Places Lived in the Last Year: 1     In the last 12 months, was there a time when you did not have a steady place to sleep or slept in a shelter (including now)?: No        Allergies:  Review of patient's allergies indicates:   Allergen Reactions    Amoxicillin Hives, Itching and Rash    Scopolamine Itching        ROS:  Review of Systems   Constitutional:  Negative for activity change, appetite change, chills and fever.   HENT:  Negative for congestion, postnasal drip, rhinorrhea, sore throat and trouble swallowing.    Respiratory:  Negative for cough, shortness of breath and wheezing.    Cardiovascular:  Negative for chest pain and palpitations.   Gastrointestinal:  Negative for abdominal pain, constipation, diarrhea, nausea and vomiting.   Genitourinary:  Negative for difficulty urinating.   Musculoskeletal:  Positive for arthralgias and back pain.   Skin:  Negative for color change and rash.   Neurological:  Negative for speech difficulty and headaches.   All other systems reviewed and are negative.         Objective      /88   Pulse 69   Temp 99.2 °F (37.3 °C) (Tympanic)   Ht 5' 2" (1.575 m)   Wt 56.3 kg (124 lb 1.9 oz)   SpO2 (!) 94%   BMI 22.70 kg/m²   Ht Readings from Last 3 Encounters:   02/27/25 5' 2" (1.575 m)   01/22/24 5' 2" (1.575 m)   12/07/23 5' 2" (1.575 m)     Wt Readings from Last 3 Encounters:   02/27/25 56.3 kg (124 lb 1.9 oz)   01/22/24 59.1 kg (130 lb 4.7 oz)   12/07/23 56.7 kg (125 lb)       PHYSICAL EXAM:  Physical Exam  Vitals and nursing note reviewed.   Constitutional:       General: She is not in acute " distress.     Appearance: Normal appearance.   HENT:      Head: Normocephalic and atraumatic.      Right Ear: Tympanic membrane, ear canal and external ear normal.      Left Ear: Tympanic membrane, ear canal and external ear normal.      Nose: Nose normal. No congestion or rhinorrhea.      Mouth/Throat:      Mouth: Mucous membranes are moist.      Pharynx: Oropharynx is clear. No oropharyngeal exudate or posterior oropharyngeal erythema.   Eyes:      Extraocular Movements: Extraocular movements intact.      Conjunctiva/sclera: Conjunctivae normal.      Pupils: Pupils are equal, round, and reactive to light.   Cardiovascular:      Rate and Rhythm: Normal rate and regular rhythm.   Pulmonary:      Effort: Pulmonary effort is normal. No respiratory distress.      Breath sounds: No wheezing, rhonchi or rales.   Musculoskeletal:         General: Normal range of motion.      Cervical back: Normal range of motion.      Lumbar back: Tenderness present.   Lymphadenopathy:      Cervical: No cervical adenopathy.   Skin:     General: Skin is warm and dry.      Findings: No rash.   Neurological:      Mental Status: She is alert.              LABS / IMAGING:  No results found for this or any previous visit (from the past 26 weeks).      Assessment    1. Acute bilateral low back pain, unspecified whether sciatica present    2. Chronic diastolic congestive heart failure    3. Primary hypertension    4. Sleep difficulties    5. Gastroesophageal reflux disease without esophagitis    6. Hyperlipidemia, unspecified hyperlipidemia type    7. Long-term use of high-risk medication    8. Breast cancer screening by mammogram    9. Anemia due to stage 3b chronic kidney disease          Amada Freedman was seen today for annual exam.    Diagnoses and all orders for this visit:    Acute bilateral low back pain, unspecified whether sciatica present  -     ketorolac injection 30 mg    Chronic diastolic congestive heart failure  -     bumetanide  (BUMEX) 1 MG tablet; Take 0.5 tablets (0.5 mg total) by mouth every other day.  -     metoprolol succinate (TOPROL-XL) 25 MG 24 hr tablet; Take 1 tablet (25 mg total) by mouth once daily.    Primary hypertension  -     losartan (COZAAR) 25 MG tablet; Take 2 tablets (50 mg total) by mouth once daily.  -     metoprolol succinate (TOPROL-XL) 25 MG 24 hr tablet; Take 1 tablet (25 mg total) by mouth once daily.  -     Lipid Panel; Future  -     TSH; Future  -     Comprehensive Metabolic Panel; Future  -     CBC Auto Differential; Future  -     Microalbumin/Creatinine Ratio, Urine; Future    Sleep difficulties  -     mirtazapine (REMERON) 15 MG tablet; Take 1 tablet (15 mg total) by mouth nightly as needed (sleep).    Gastroesophageal reflux disease without esophagitis  -     pantoprazole (PROTONIX) 40 MG tablet; Take 1 tablet (40 mg total) by mouth once daily.    Hyperlipidemia, unspecified hyperlipidemia type  -     rosuvastatin (CRESTOR) 10 MG tablet; Take 1 tablet (10 mg total) by mouth every evening.  -     Lipid Panel; Future    Long-term use of high-risk medication  -     Lipid Panel; Future  -     TSH; Future  -     Comprehensive Metabolic Panel; Future  -     CBC Auto Differential; Future  -     Hemoglobin A1C; Future  -     Microalbumin/Creatinine Ratio, Urine; Future    Breast cancer screening by mammogram  -     Mammo Digital Screening Bilat w/ Rajesh (XPD); Future    Anemia due to stage 3b chronic kidney disease    Other orders  -     allopurinoL (ZYLOPRIM) 100 MG tablet; Take 1 tablet (100 mg total) by mouth once daily.  -     citalopram (CELEXA) 40 MG tablet; Take 1 tablet (40 mg total) by mouth once daily.  -     naloxone (NARCAN) 4 mg/actuation Spry; 1 spray (4 mg total) by Nasal route once. SPRAY 1 SPRAY INTO ONE NOSTRIL REPEAT ANOTHER INTO OPPOSITE NOSTRIL IN 2 TO 3 MINUTES UNTIL PT RESPONSIVE OR HELP ARRIVES for 1 dose  -     rOPINIRole (REQUIP) 1 MG tablet; Take 1 tablet (1 mg total) by mouth every  evening.    Overall, doing well.  Back hurting.      Toradol injection today.      Continue to follow with pain management.  Offer a referral to physical therapy.  She will let us know.    Other med refills, as above.      Orders for screening blood work placed today.  We would like to do this before her next visit.      FOLLOW-UP:  Follow up in about 6 months (around 2025) for check up, labs 1 week prior.    I spent a total of 40 minutes face to face and non-face to face on the date of this visit.This includes time preparing to see the patient (eg, review of tests, notes), obtaining and/or reviewing additional history from an independent historian and/or outside medical records, documenting clinical information in the electronic health record, independently interpreting results and/or communicating results to the patient/family/caregiver, or care coordinator.  Visit today included increased complexity associated with the care of the episodic problem addressed and managing the longitudinal care of the patient due to the serious and/or complex managed problem(s).    Signed by:  Brock Ortiz MD         [1]   Current Outpatient Medications on File Prior to Visit   Medication Sig Dispense Refill    albuterol-ipratropium (DUO-NEB) 2.5 mg-0.5 mg/3 mL nebulizer solution Take by nebulization every 6 (six) hours as needed.      cetirizine (ZYRTEC) 10 MG tablet Take 1 tablet (10 mg total) by mouth once daily. 90 tablet 3    clonazePAM (KLONOPIN) 0.5 MG tablet Take 0.25 mg by mouth nightly as needed.      fluticasone propionate (FLONASE) 50 mcg/actuation nasal spray fluticasone propionate 50 mcg/actuation nasal spray,suspension   USE 1 SPRAY IN EACH NOSTRIL DAILY      HYDROcodone-acetaminophen (NORCO) 7.5-325 mg per tablet SMARTSI Tablet(s) By Mouth Every 12 Hours PRN      pregabalin (LYRICA) 50 MG capsule Take 50 mg by mouth 3 (three) times daily as needed.      TRELEGY ELLIPTA 100-62.5-25 mcg DsDv SMARTSI  Inhalation Via Inhaler Daily      triamcinolone acetonide 0.025% (KENALOG) 0.025 % Oint Apply topically 2 (two) times daily. Until symptoms improve. 30 g 0    triamcinolone acetonide 0.1% (KENALOG) 0.1 % cream Apply topically 2 (two) times daily. 454 g 5    [DISCONTINUED] allopurinoL (ZYLOPRIM) 100 MG tablet Take 1 tablet (100 mg total) by mouth once daily. 90 tablet 3    [DISCONTINUED] bumetanide (BUMEX) 1 MG tablet Take 0.5 tablets (0.5 mg total) by mouth every other day. 45 tablet 3    [DISCONTINUED] citalopram (CELEXA) 40 MG tablet Take 1 tablet (40 mg total) by mouth once daily. 90 tablet 3    [DISCONTINUED] ferrous sulfate (FEOSOL) 325 mg (65 mg iron) Tab tablet Take 1 tablet 325 mg PO every other day. 45 tablet 1    [DISCONTINUED] losartan (COZAAR) 25 MG tablet Take 2 tablets (50 mg total) by mouth once daily. 180 tablet 3    [DISCONTINUED] metoprolol succinate (TOPROL-XL) 25 MG 24 hr tablet Take 1 tablet (25 mg total) by mouth once daily. 90 tablet 3    [DISCONTINUED] mirtazapine (REMERON) 15 MG tablet Take 1 tablet (15 mg total) by mouth nightly as needed (sleep). 90 tablet 3    [DISCONTINUED] naloxone (NARCAN) 4 mg/actuation Spry naloxone 4 mg/actuation nasal spray   SPRAY 1 SPRAY INTO ONE NOSTRIL REPEAT ANOTHER INTO OPPOSITE NOSTRIL IN 2 TO 3 MINUTES UNTIL PT RESPONSIVE OR HELP ARRIVES      [DISCONTINUED] pantoprazole (PROTONIX) 40 MG tablet Take 1 tablet (40 mg total) by mouth once daily. 90 tablet 3    [DISCONTINUED] rOPINIRole (REQUIP) 1 MG tablet Take 1 tablet (1 mg total) by mouth every evening. 90 tablet 3    [DISCONTINUED] rosuvastatin (CRESTOR) 10 MG tablet Take 1 tablet (10 mg total) by mouth every evening. 90 tablet 3    albuterol (PROVENTIL/VENTOLIN HFA) 90 mcg/actuation inhaler Inhale 2 puffs into the lungs every 6 (six) hours as needed.       No current facility-administered medications on file prior to visit.

## 2025-03-24 DIAGNOSIS — Z00.00 ENCOUNTER FOR MEDICARE ANNUAL WELLNESS EXAM: ICD-10-CM

## 2025-04-17 ENCOUNTER — PATIENT OUTREACH (OUTPATIENT)
Dept: ADMINISTRATIVE | Facility: HOSPITAL | Age: 70
End: 2025-04-17
Payer: MEDICARE

## 2025-04-17 NOTE — PROGRESS NOTES
Working mammogram report; Chart searched; Attempted to contact patient, no answer, no voicemail. Will send portal message.

## 2025-07-30 ENCOUNTER — TELEPHONE (OUTPATIENT)
Dept: PRIMARY CARE CLINIC | Facility: CLINIC | Age: 70
End: 2025-07-30
Payer: MEDICARE

## 2025-07-30 NOTE — TELEPHONE ENCOUNTER
Copied from CRM #[id number]. Topic: Appointments - Appointment Access  >> Jul 30, 2025 11:32 AM Ofelia wrote:  Type:  Sooner Appointment Request    Caller is requesting a sooner appointment.  Caller declined first available appointment listed below.  Caller will not accept being placed on the waitlist and is requesting a message be sent to doctor.  Name of Caller:Jasmina Sharma  When is the first available appointment? Dec 19  Symptoms: medication check  Would the patient rather a call back or a response via [facility]? call  Best Call Back Number: [phone]    Additional Information:

## 2025-08-13 ENCOUNTER — PATIENT MESSAGE (OUTPATIENT)
Dept: ADMINISTRATIVE | Facility: HOSPITAL | Age: 70
End: 2025-08-13
Payer: MEDICARE

## 2025-08-14 ENCOUNTER — PATIENT OUTREACH (OUTPATIENT)
Dept: ADMINISTRATIVE | Facility: HOSPITAL | Age: 70
End: 2025-08-14
Payer: MEDICARE

## 2025-08-21 ENCOUNTER — PATIENT OUTREACH (OUTPATIENT)
Dept: ADMINISTRATIVE | Facility: HOSPITAL | Age: 70
End: 2025-08-21
Payer: MEDICARE

## 2025-08-26 ENCOUNTER — PATIENT OUTREACH (OUTPATIENT)
Dept: ADMINISTRATIVE | Facility: HOSPITAL | Age: 70
End: 2025-08-26
Payer: MEDICARE

## 2025-08-27 ENCOUNTER — TELEPHONE (OUTPATIENT)
Dept: PRIMARY CARE CLINIC | Facility: CLINIC | Age: 70
End: 2025-08-27
Payer: MEDICARE

## 2025-08-27 ENCOUNTER — OFFICE VISIT (OUTPATIENT)
Dept: PRIMARY CARE CLINIC | Facility: CLINIC | Age: 70
End: 2025-08-27
Payer: MEDICARE

## 2025-08-27 ENCOUNTER — HOSPITAL ENCOUNTER (OUTPATIENT)
Dept: RADIOLOGY | Facility: HOSPITAL | Age: 70
Discharge: HOME | End: 2025-08-27
Attending: FAMILY MEDICINE
Payer: MEDICARE

## 2025-08-27 VITALS
HEART RATE: 85 BPM | OXYGEN SATURATION: 97 % | HEIGHT: 62 IN | BODY MASS INDEX: 21.46 KG/M2 | TEMPERATURE: 98 F | WEIGHT: 116.63 LBS

## 2025-08-27 DIAGNOSIS — N18.31 STAGE 3A CHRONIC KIDNEY DISEASE: ICD-10-CM

## 2025-08-27 DIAGNOSIS — I50.32 CHRONIC DIASTOLIC CONGESTIVE HEART FAILURE: ICD-10-CM

## 2025-08-27 DIAGNOSIS — Z87.891 PERSONAL HISTORY OF SMOKING: ICD-10-CM

## 2025-08-27 DIAGNOSIS — E78.5 HYPERLIPIDEMIA, UNSPECIFIED HYPERLIPIDEMIA TYPE: ICD-10-CM

## 2025-08-27 DIAGNOSIS — J44.1 COPD WITH ACUTE EXACERBATION: ICD-10-CM

## 2025-08-27 DIAGNOSIS — F41.9 ANXIETY: ICD-10-CM

## 2025-08-27 DIAGNOSIS — R05.8 COUGH PRESENT FOR GREATER THAN 3 WEEKS: Primary | ICD-10-CM

## 2025-08-27 DIAGNOSIS — K21.9 GASTROESOPHAGEAL REFLUX DISEASE WITHOUT ESOPHAGITIS: ICD-10-CM

## 2025-08-27 DIAGNOSIS — Z12.31 BREAST CANCER SCREENING BY MAMMOGRAM: ICD-10-CM

## 2025-08-27 DIAGNOSIS — G47.9 SLEEP DIFFICULTIES: ICD-10-CM

## 2025-08-27 DIAGNOSIS — I10 PRIMARY HYPERTENSION: ICD-10-CM

## 2025-08-27 DIAGNOSIS — G25.81 RLS (RESTLESS LEGS SYNDROME): ICD-10-CM

## 2025-08-27 PROCEDURE — 99215 OFFICE O/P EST HI 40 MIN: CPT | Mod: S$GLB,,, | Performed by: FAMILY MEDICINE

## 2025-08-27 PROCEDURE — 3288F FALL RISK ASSESSMENT DOCD: CPT | Mod: CPTII,S$GLB,, | Performed by: FAMILY MEDICINE

## 2025-08-27 PROCEDURE — 99999 PR PBB SHADOW E&M-EST. PATIENT-LVL V: CPT | Mod: PBBFAC,,, | Performed by: FAMILY MEDICINE

## 2025-08-27 PROCEDURE — 4010F ACE/ARB THERAPY RXD/TAKEN: CPT | Mod: CPTII,S$GLB,, | Performed by: FAMILY MEDICINE

## 2025-08-27 PROCEDURE — 71271 CT THORAX LUNG CANCER SCR C-: CPT | Mod: TC,PN

## 2025-08-27 PROCEDURE — G2211 COMPLEX E/M VISIT ADD ON: HCPCS | Mod: S$GLB,,, | Performed by: FAMILY MEDICINE

## 2025-08-27 PROCEDURE — 1101F PT FALLS ASSESS-DOCD LE1/YR: CPT | Mod: CPTII,S$GLB,, | Performed by: FAMILY MEDICINE

## 2025-08-27 PROCEDURE — 1159F MED LIST DOCD IN RCRD: CPT | Mod: CPTII,S$GLB,, | Performed by: FAMILY MEDICINE

## 2025-08-27 PROCEDURE — 71271 CT THORAX LUNG CANCER SCR C-: CPT | Mod: 26,,, | Performed by: RADIOLOGY

## 2025-08-27 PROCEDURE — 1126F AMNT PAIN NOTED NONE PRSNT: CPT | Mod: CPTII,S$GLB,, | Performed by: FAMILY MEDICINE

## 2025-08-27 PROCEDURE — 3008F BODY MASS INDEX DOCD: CPT | Mod: CPTII,S$GLB,, | Performed by: FAMILY MEDICINE

## 2025-08-27 RX ORDER — ALLOPURINOL 100 MG/1
100 TABLET ORAL DAILY
Qty: 90 TABLET | Refills: 3 | Status: SHIPPED | OUTPATIENT
Start: 2025-08-27

## 2025-08-27 RX ORDER — CITALOPRAM 40 MG/1
40 TABLET ORAL DAILY
Qty: 90 TABLET | Refills: 3 | Status: SHIPPED | OUTPATIENT
Start: 2025-08-27 | End: 2026-08-27

## 2025-08-27 RX ORDER — CETIRIZINE HYDROCHLORIDE 10 MG/1
10 TABLET ORAL DAILY
Qty: 90 TABLET | Refills: 3 | Status: SHIPPED | OUTPATIENT
Start: 2025-08-27

## 2025-08-27 RX ORDER — PROMETHAZINE HYDROCHLORIDE AND DEXTROMETHORPHAN HYDROBROMIDE 6.25; 15 MG/5ML; MG/5ML
5 SYRUP ORAL EVERY 6 HOURS PRN
Qty: 120 ML | Refills: 0 | Status: SHIPPED | OUTPATIENT
Start: 2025-08-27 | End: 2025-09-06

## 2025-08-27 RX ORDER — BUMETANIDE 1 MG/1
0.5 TABLET ORAL EVERY OTHER DAY
Qty: 45 TABLET | Refills: 3 | Status: SHIPPED | OUTPATIENT
Start: 2025-08-27

## 2025-08-27 RX ORDER — LOSARTAN POTASSIUM 25 MG/1
50 TABLET ORAL DAILY
Qty: 180 TABLET | Refills: 3 | Status: SHIPPED | OUTPATIENT
Start: 2025-08-27

## 2025-08-27 RX ORDER — MIRTAZAPINE 15 MG/1
15 TABLET, FILM COATED ORAL NIGHTLY PRN
Qty: 90 TABLET | Refills: 3 | Status: SHIPPED | OUTPATIENT
Start: 2025-08-27

## 2025-08-27 RX ORDER — PANTOPRAZOLE SODIUM 40 MG/1
40 TABLET, DELAYED RELEASE ORAL DAILY
Qty: 90 TABLET | Refills: 3 | Status: SHIPPED | OUTPATIENT
Start: 2025-08-27

## 2025-08-27 RX ORDER — METOPROLOL SUCCINATE 25 MG/1
25 TABLET, EXTENDED RELEASE ORAL DAILY
Qty: 90 TABLET | Refills: 3 | Status: SHIPPED | OUTPATIENT
Start: 2025-08-27

## 2025-08-27 RX ORDER — BENZONATATE 200 MG/1
200 CAPSULE ORAL 3 TIMES DAILY PRN
Qty: 30 CAPSULE | Refills: 0 | Status: SHIPPED | OUTPATIENT
Start: 2025-08-27 | End: 2025-09-06

## 2025-08-27 RX ORDER — DEXTROMETHORPHAN HBR AND PYRILAMINE MALEATE 30; 30 MG/1; MG/1
1 TABLET ORAL 3 TIMES DAILY
Qty: 30 TABLET | Refills: 0 | Status: SHIPPED | OUTPATIENT
Start: 2025-08-27 | End: 2025-09-06

## 2025-08-27 RX ORDER — PREDNISONE 20 MG/1
40 TABLET ORAL DAILY
Qty: 10 TABLET | Refills: 0 | Status: SHIPPED | OUTPATIENT
Start: 2025-08-27 | End: 2025-09-01

## 2025-08-27 RX ORDER — LEVOFLOXACIN 750 MG/1
750 TABLET, FILM COATED ORAL DAILY
Qty: 5 TABLET | Refills: 0 | Status: SHIPPED | OUTPATIENT
Start: 2025-08-27 | End: 2025-09-01

## 2025-08-27 RX ORDER — PREGABALIN 150 MG/1
300 CAPSULE ORAL EVERY 8 HOURS PRN
COMMUNITY
Start: 2025-08-04

## 2025-08-27 RX ORDER — ROSUVASTATIN CALCIUM 10 MG/1
10 TABLET, COATED ORAL NIGHTLY
Qty: 90 TABLET | Refills: 3 | Status: SHIPPED | OUTPATIENT
Start: 2025-08-27

## 2025-08-27 RX ORDER — ROPINIROLE 1 MG/1
1 TABLET, FILM COATED ORAL NIGHTLY
Qty: 90 TABLET | Refills: 3 | Status: SHIPPED | OUTPATIENT
Start: 2025-08-27 | End: 2026-08-27